# Patient Record
Sex: FEMALE | Race: OTHER | Employment: UNEMPLOYED | URBAN - METROPOLITAN AREA
[De-identification: names, ages, dates, MRNs, and addresses within clinical notes are randomized per-mention and may not be internally consistent; named-entity substitution may affect disease eponyms.]

---

## 2017-04-07 ENCOUNTER — APPOINTMENT (OUTPATIENT)
Dept: GENERAL RADIOLOGY | Age: 57
End: 2017-04-07
Attending: EMERGENCY MEDICINE
Payer: SELF-PAY

## 2017-04-07 ENCOUNTER — HOSPITAL ENCOUNTER (EMERGENCY)
Age: 57
Discharge: HOME | End: 2017-04-07
Attending: EMERGENCY MEDICINE
Payer: SELF-PAY

## 2017-04-07 VITALS
BODY MASS INDEX: 28.71 KG/M2 | HEIGHT: 62 IN | WEIGHT: 156 LBS | DIASTOLIC BLOOD PRESSURE: 89 MMHG | HEART RATE: 102 BPM | OXYGEN SATURATION: 98 % | SYSTOLIC BLOOD PRESSURE: 152 MMHG | TEMPERATURE: 99 F | RESPIRATION RATE: 17 BRPM

## 2017-04-07 DIAGNOSIS — J20.9 ACUTE BRONCHITIS, UNSPECIFIED ORGANISM: Primary | ICD-10-CM

## 2017-04-07 LAB
ALBUMIN SERPL BCP-MCNC: 3.7 G/DL (ref 3.4–5)
ALBUMIN/GLOB SERPL: 0.9 {RATIO} (ref 0.8–1.7)
ALP SERPL-CCNC: 124 U/L (ref 45–117)
ALT SERPL-CCNC: 81 U/L (ref 13–56)
ANION GAP BLD CALC-SCNC: 8 MMOL/L (ref 3–18)
AST SERPL W P-5'-P-CCNC: 50 U/L (ref 15–37)
BASOPHILS # BLD AUTO: 0 K/UL (ref 0–0.06)
BASOPHILS # BLD: 0 % (ref 0–2)
BILIRUB SERPL-MCNC: 0.9 MG/DL (ref 0.2–1)
BUN SERPL-MCNC: 7 MG/DL (ref 7–18)
BUN/CREAT SERPL: 10 (ref 12–20)
CALCIUM SERPL-MCNC: 8.1 MG/DL (ref 8.5–10.1)
CHLORIDE SERPL-SCNC: 106 MMOL/L (ref 100–108)
CK MB CFR SERPL CALC: 1.5 % (ref 0–4)
CK MB SERPL-MCNC: 1.9 NG/ML (ref 5–25)
CK SERPL-CCNC: 127 U/L (ref 26–192)
CO2 SERPL-SCNC: 27 MMOL/L (ref 21–32)
CREAT SERPL-MCNC: 0.68 MG/DL (ref 0.6–1.3)
DIFFERENTIAL METHOD BLD: ABNORMAL
EOSINOPHIL # BLD: 0.4 K/UL (ref 0–0.4)
EOSINOPHIL NFR BLD: 2 % (ref 0–5)
ERYTHROCYTE [DISTWIDTH] IN BLOOD BY AUTOMATED COUNT: 13.8 % (ref 11.6–14.5)
GLOBULIN SER CALC-MCNC: 4 G/DL (ref 2–4)
GLUCOSE SERPL-MCNC: 95 MG/DL (ref 74–99)
HCT VFR BLD AUTO: 36.4 % (ref 35–45)
HGB BLD-MCNC: 12.4 G/DL (ref 12–16)
LACTATE BLD-SCNC: 0.7 MMOL/L (ref 0.4–2)
LYMPHOCYTES # BLD AUTO: 24 % (ref 21–52)
LYMPHOCYTES # BLD: 3.6 K/UL (ref 0.9–3.6)
MCH RBC QN AUTO: 29.7 PG (ref 24–34)
MCHC RBC AUTO-ENTMCNC: 34.1 G/DL (ref 31–37)
MCV RBC AUTO: 87.1 FL (ref 74–97)
MONOCYTES # BLD: 0.5 K/UL (ref 0.05–1.2)
MONOCYTES NFR BLD AUTO: 4 % (ref 3–10)
NEUTS SEG # BLD: 10.3 K/UL (ref 1.8–8)
NEUTS SEG NFR BLD AUTO: 70 % (ref 40–73)
PLATELET # BLD AUTO: 337 K/UL (ref 135–420)
PMV BLD AUTO: 8.6 FL (ref 9.2–11.8)
POTASSIUM SERPL-SCNC: 3.5 MMOL/L (ref 3.5–5.5)
PROT SERPL-MCNC: 7.7 G/DL (ref 6.4–8.2)
RBC # BLD AUTO: 4.18 M/UL (ref 4.2–5.3)
SODIUM SERPL-SCNC: 141 MMOL/L (ref 136–145)
TROPONIN I SERPL-MCNC: <0.02 NG/ML (ref 0–0.04)
WBC # BLD AUTO: 14.9 K/UL (ref 4.6–13.2)

## 2017-04-07 PROCEDURE — 96365 THER/PROPH/DIAG IV INF INIT: CPT

## 2017-04-07 PROCEDURE — 85025 COMPLETE CBC W/AUTO DIFF WBC: CPT | Performed by: EMERGENCY MEDICINE

## 2017-04-07 PROCEDURE — 82550 ASSAY OF CK (CPK): CPT | Performed by: EMERGENCY MEDICINE

## 2017-04-07 PROCEDURE — 71010 XR CHEST PORT: CPT

## 2017-04-07 PROCEDURE — 80053 COMPREHEN METABOLIC PANEL: CPT | Performed by: EMERGENCY MEDICINE

## 2017-04-07 PROCEDURE — 93005 ELECTROCARDIOGRAM TRACING: CPT

## 2017-04-07 PROCEDURE — 74011250636 HC RX REV CODE- 250/636: Performed by: EMERGENCY MEDICINE

## 2017-04-07 PROCEDURE — 99283 EMERGENCY DEPT VISIT LOW MDM: CPT

## 2017-04-07 PROCEDURE — 87040 BLOOD CULTURE FOR BACTERIA: CPT | Performed by: EMERGENCY MEDICINE

## 2017-04-07 PROCEDURE — 83605 ASSAY OF LACTIC ACID: CPT

## 2017-04-07 RX ORDER — AZITHROMYCIN 250 MG/1
TABLET, FILM COATED ORAL
Qty: 6 TAB | Refills: 0 | Status: SHIPPED | OUTPATIENT
Start: 2017-04-07 | End: 2017-06-14

## 2017-04-07 RX ORDER — HYDROCODONE POLISTIREX AND CHLORPHENIRAMINE POLISTIREX 10; 8 MG/5ML; MG/5ML
5 SUSPENSION, EXTENDED RELEASE ORAL
Qty: 60 ML | Refills: 0 | Status: SHIPPED | OUTPATIENT
Start: 2017-04-07 | End: 2017-06-14

## 2017-04-07 RX ORDER — LEVOFLOXACIN 5 MG/ML
500 INJECTION, SOLUTION INTRAVENOUS
Status: COMPLETED | OUTPATIENT
Start: 2017-04-07 | End: 2017-04-07

## 2017-04-07 RX ADMIN — LEVOFLOXACIN 500 MG: 5 INJECTION, SOLUTION INTRAVENOUS at 19:28

## 2017-04-07 NOTE — ED PROVIDER NOTES
HPI Comments: 5:46 PM Cee Nino is a 64 y.o. female with a history of HTN and chronic pain who presents to the emergency department c/o chest pain onset 3 days ago. The patient explains associated symptoms of fever, cough, and SOB . Pt rates the pain 7/10 in severity. Pt denies vomiting, and wheezing. Pt also denies alcohol, tobacco, and drug usage. and  reports having a flu shot this year. No other concerns at this time. PCP: Eliel Aguirre MD      The history is provided by the patient. Past Medical History:   Diagnosis Date    Hypertension     Other ill-defined conditions     chronic pain    Trauma        Past Surgical History:   Procedure Laterality Date    HX ORTHOPAEDIC      repair fracture back         History reviewed. No pertinent family history. Social History     Social History    Marital status: SINGLE     Spouse name: N/A    Number of children: N/A    Years of education: N/A     Occupational History    Not on file. Social History Main Topics    Smoking status: Current Every Day Smoker    Smokeless tobacco: Not on file    Alcohol use No    Drug use: No    Sexual activity: Not on file     Other Topics Concern    Not on file     Social History Narrative         ALLERGIES: Penicillins    Review of Systems   Constitutional: Positive for fever. Negative for chills and fatigue. HENT: Negative for congestion, rhinorrhea and sore throat. Respiratory: Positive for cough and shortness of breath. Negative for wheezing. Cardiovascular: Positive for chest pain. Negative for palpitations. Gastrointestinal: Negative for abdominal pain, diarrhea, nausea and vomiting. Genitourinary: Negative for dysuria, hematuria and urgency. Musculoskeletal: Negative for myalgias. Skin: Negative for rash and wound. Neurological: Negative for dizziness and headaches. All other systems reviewed and are negative.       Vitals:    04/07/17 1743   BP: 152/89   Pulse: (!) 102 Resp: 17   Temp: 99 °F (37.2 °C)   SpO2: 98%   Weight: 70.8 kg (156 lb)   Height: 5' 2\" (1.575 m)            Physical Exam   Constitutional: She is oriented to person, place, and time. She appears well-developed and well-nourished. No distress. HENT:   Head: Normocephalic and atraumatic. Mouth/Throat: Oropharynx is clear and moist.   Eyes: Conjunctivae and EOM are normal. Pupils are equal, round, and reactive to light. No scleral icterus. Neck: Normal range of motion. Neck supple. Cardiovascular: Normal rate, regular rhythm and normal heart sounds. No murmur heard. Pulmonary/Chest: Effort normal. No respiratory distress. She has wheezes (minimal expiratory wheezing ). She exhibits tenderness. Abdominal: Soft. Bowel sounds are normal. She exhibits no distension. There is no tenderness. Lymphadenopathy:     She has no cervical adenopathy. Neurological: She is alert and oriented to person, place, and time. Coordination normal.   Skin: Skin is warm and dry. No rash noted. Psychiatric: She has a normal mood and affect. Her behavior is normal.   Nursing note and vitals reviewed. MDM  Number of Diagnoses or Management Options  Acute bronchitis, unspecified organism:   Diagnosis management comments: Fever with cough and pleuritic chest pain h/o immunocompromised due to humira for arthritis     Ekg; nsr rate 97 no acute st changes     Cxr ?  R LL infiltrate     Will start abx in ED with pt h/o humira use        Amount and/or Complexity of Data Reviewed  Clinical lab tests: ordered and reviewed  Tests in the radiology section of CPT®: ordered and reviewed  Independent visualization of images, tracings, or specimens: yes    Risk of Complications, Morbidity, and/or Mortality  Presenting problems: high  Diagnostic procedures: moderate  Management options: moderate      ED Course       Procedures    Vitals:  Patient Vitals for the past 12 hrs:   Temp Pulse Resp BP SpO2   04/07/17 1743 99 °F (37.2 °C) (!) 102 17 152/89 98 %       Medications ordered:   Medications   levoFLOXacin (LEVAQUIN) 500 mg in D5W IVPB (0 mg IntraVENous IV Completed 4/7/17 2028)         Lab findings:  Recent Results (from the past 12 hour(s))   EKG, 12 LEAD, INITIAL    Collection Time: 04/07/17  5:38 PM   Result Value Ref Range    Ventricular Rate 97 BPM    Atrial Rate 97 BPM    P-R Interval 154 ms    QRS Duration 82 ms    Q-T Interval 348 ms    QTC Calculation (Bezet) 441 ms    Calculated P Axis 57 degrees    Calculated R Axis -5 degrees    Calculated T Axis 37 degrees    Diagnosis       Normal sinus rhythm  Normal ECG  When compared with ECG of 03-JUN-2015 11:19,  Criteria for Septal infarct are no longer present     CBC WITH AUTOMATED DIFF    Collection Time: 04/07/17  5:45 PM   Result Value Ref Range    WBC 14.9 (H) 4.6 - 13.2 K/uL    RBC 4.18 (L) 4.20 - 5.30 M/uL    HGB 12.4 12.0 - 16.0 g/dL    HCT 36.4 35.0 - 45.0 %    MCV 87.1 74.0 - 97.0 FL    MCH 29.7 24.0 - 34.0 PG    MCHC 34.1 31.0 - 37.0 g/dL    RDW 13.8 11.6 - 14.5 %    PLATELET 398 552 - 189 K/uL    MPV 8.6 (L) 9.2 - 11.8 FL    NEUTROPHILS 70 40 - 73 %    LYMPHOCYTES 24 21 - 52 %    MONOCYTES 4 3 - 10 %    EOSINOPHILS 2 0 - 5 %    BASOPHILS 0 0 - 2 %    ABS. NEUTROPHILS 10.3 (H) 1.8 - 8.0 K/UL    ABS. LYMPHOCYTES 3.6 0.9 - 3.6 K/UL    ABS. MONOCYTES 0.5 0.05 - 1.2 K/UL    ABS. EOSINOPHILS 0.4 0.0 - 0.4 K/UL    ABS.  BASOPHILS 0.0 0.0 - 0.06 K/UL    DF AUTOMATED     METABOLIC PANEL, COMPREHENSIVE    Collection Time: 04/07/17  5:45 PM   Result Value Ref Range    Sodium 141 136 - 145 mmol/L    Potassium 3.5 3.5 - 5.5 mmol/L    Chloride 106 100 - 108 mmol/L    CO2 27 21 - 32 mmol/L    Anion gap 8 3.0 - 18 mmol/L    Glucose 95 74 - 99 mg/dL    BUN 7 7.0 - 18 MG/DL    Creatinine 0.68 0.6 - 1.3 MG/DL    BUN/Creatinine ratio 10 (L) 12 - 20      GFR est AA >60 >60 ml/min/1.73m2    GFR est non-AA >60 >60 ml/min/1.73m2    Calcium 8.1 (L) 8.5 - 10.1 MG/DL    Bilirubin, total 0.9 0.2 - 1.0 MG/DL    ALT (SGPT) 81 (H) 13 - 56 U/L    AST (SGOT) 50 (H) 15 - 37 U/L    Alk. phosphatase 124 (H) 45 - 117 U/L    Protein, total 7.7 6.4 - 8.2 g/dL    Albumin 3.7 3.4 - 5.0 g/dL    Globulin 4.0 2.0 - 4.0 g/dL    A-G Ratio 0.9 0.8 - 1.7     CARDIAC PANEL,(CK, CKMB & TROPONIN)    Collection Time: 04/07/17  5:45 PM   Result Value Ref Range     26 - 192 U/L    CK - MB 1.9 <3.6 ng/ml    CK-MB Index 1.5 0.0 - 4.0 %    Troponin-I, Qt. <0.02 0.0 - 0.045 NG/ML   POC LACTIC ACID    Collection Time: 04/07/17  6:03 PM   Result Value Ref Range    Lactic Acid (POC) 0.7 0.4 - 2.0 mmol/L       EKG interpretation by ED Physician:      X-Ray, CT or other radiology findings or impressions:  XR CHEST PORT    (Results Pending)       Progress notes, Consult notes or additional Procedure notes:       Reevaluation of patient:   7:47 PM Patient was discharged in stable condition. Patient is to return to emergency department if any new or worsening condition. Disposition:  Diagnosis:   1. Acute bronchitis, unspecified organism        Disposition: Discharge     Follow-up Information     Follow up With Details Comments Contact [facility] EMERGENCY DEPT  As needed, If symptoms worsen [address] [place] [address]    Jose D Hernández MD Schedule an appointment as soon as possible for a visit for ED follow up appointment [facility]  [phone]              Discharge Medication List as of 4/7/2017  8:31 PM      START taking these medications    Details   azithromycin (ZITHROMAX Z-CECE) 250 mg tablet Take two tablets today then one tablet daily, Print, Disp-6 Tab, R-0      chlorpheniramine-HYDROcodone (TUSSIONEX PENNKINETIC ER) 10-8 mg/5 mL suspension Take 5 mL by mouth every twelve (12) hours as needed for Cough.  Max Daily Amount: 10 mL., Print, Disp-60 mL, R-0         CONTINUE these medications which have NOT CHANGED    Details   Morphine (DORIS) 60 mg ER capsule Take 90 mg by mouth daily. , Historical Med      amLODIPine-benazepril (LOTREL) 5-10 mg per capsule Take 1 Cap by mouth daily. , Historical Med      folic acid (FOLVITE) 1 mg tablet Take  by mouth daily. , Historical Med      gabapentin (NEURONTIN) 300 mg capsule Take 300 mg by mouth three (3) times daily. , Historical Med      adalimumab (HUMIRA) 40 mg/0.8 mL injection by SubCUTAneous route once., Historical Med      ondansetron hcl (ZOFRAN, AS HYDROCHLORIDE,) 4 mg tablet Take 1 Tab by mouth every eight (8) hours as needed for Nausea. , Print, Disp-6 Tab, R-0      magnesium hydroxide (MILK OF MAGNESIA) 400 mg/5 mL suspension Take 30 mL by mouth nightly. , Print, Disp-1 Bottle, R-0           Scribe Attestation  Sinan Bowers scribing for and in presence of Ericka Koch MD (04/07/17)      Physician Attestation  I personally preformed the services described in this documentation, reviewed and edited the documentation which was dictated to the scribe in my presence, and it accurately records my words and actions.      Em Watkins (04/07/17)      Signed by: Ann Krueger, 04/07/17, 5:43 PM

## 2017-04-07 NOTE — DISCHARGE INSTRUCTIONS
Bronquitis: Instrucciones de cuidado - [ Bronchitis: Care Instructions ]  Instrucciones de cuidado    La bronquitis es jennyfer inflamación de los bronquios (conductos bronquiales), que llevan aire a los pulmones. Los tubos se hinchan y producen mucosidad, o flema. La mucosidad y los bronquios inflamados hacen que tosa. Es posible que tenga problemas para respirar. Lina Bon de los casos de bronquitis son causados por virus emma los que causan los resfriados. Los antibióticos generalmente no ayudan y pueden ser dañinos. La bronquitis suele aparecer con Bradly Pique y dura alrededor de 2 a 3 semanas en personas que por lo demás son saludables. La atención de seguimiento es jennyfer parte clave de chun tratamiento y seguridad. Asegúrese de hacer y acudir a todas las citas, y llame a chun médico si está teniendo problemas. También es jennyfer buena idea saber los resultados de los exámenes y mantener jennyfer lista de los medicamentos que amie. ¿Cómo puede cuidarse en el hogar? · [employer] medicamentos exactamente emma le fueron recetados. Llame a chun médico si buddy que está teniendo un problema con dania medicamentos. · Descanse un poco más. · Washam un analgésico (medicamento para el dolor) de venta demetri, emma acetaminofén (Tylenol), ibuprofeno (Advil, Motrin) o naproxeno (Aleve), para reducir la fiebre y [employer] rashad en el cuerpo. Sara y siga todas las instrucciones de la [place]. · No tome dos o más analgésicos al mismo tiempo a menos que el médico se lo haya indicado. Muchos analgésicos contienen acetaminofén, es decir, Tylenol. El exceso de acetaminofén (Tylenol) puede ser dañino. · Washam un medicamento para la tos de venta demetri que contenga dextrometorfano para ayudar a aliviar la tos seca y persistente y así poder dormir. Evite los medicamentos para la tos que tengan más de un ingrediente [place]. Sara y siga todas las instrucciones de la [place].   · Respire aire húmedo de un humidificador, ducha caliente o lavabo lleno de [place]. El calor y la humedad adelgazarán la mucosidad para que pueda expulsarla. · No fume. Fumar puede empeorar la bronquitis. Si necesita ayuda para dejar de fumar, hable con chun médico sobre programas y medicamentos para dejar de fumar. Estos pueden aumentar dania probabilidades de dejar el hábito para siempre. ¿Cuándo debe pedir ayuda? Llame al 911 en cualquier momento que considere que necesita atención de emergencia. Por ejemplo, llame si:  · [address] dificultades para respirar. Llame a chun médico ahora mismo o busque atención médica inmediata si:  · Tiene nueva o peor dificultad para respirar. · Tose mucosidad (esputo) de color café oscuro o con endy. · Tiene jennyfer fiebre nueva o más glenny. · Tiene un salpullido nuevo. Preste especial atención a los cambios en chun apollo y asegúrese de comunicarse con chun médico si:  · Chun tos es más profunda o más frecuente que antes, especialmente si nota más mucosidad o un cambio en el color de la mucosidad. · No mejora emma se esperaba. ¿Dónde puede encontrar más información en inglés? Ayala Draper a http://hayley-derrick.info/. Escriba H333 en la búsqueda para aprender más acerca de \"Bronquitis: Instrucciones de cuidado - [ Bronchitis: Care Instructions ]. \"  Revisado: [address], 2016  Versión del contenido: 11.2  © 4799-7145 [employer]. Las instrucciones de cuidado fueron adaptadas bajo licencia por [employer] (which disclaims liability or warranty for this information). Si usted tiene [place] afección médica o sobre estas instrucciones, siempre pregunte a chun profesional de apollo. [facility] niega toda garantía o responsabilidad por chun uso de esta información.

## 2017-04-08 LAB
ATRIAL RATE: 97 BPM
CALCULATED P AXIS, ECG09: 57 DEGREES
CALCULATED R AXIS, ECG10: -5 DEGREES
CALCULATED T AXIS, ECG11: 37 DEGREES
DIAGNOSIS, 93000: NORMAL
P-R INTERVAL, ECG05: 154 MS
Q-T INTERVAL, ECG07: 348 MS
QRS DURATION, ECG06: 82 MS
QTC CALCULATION (BEZET), ECG08: 441 MS
VENTRICULAR RATE, ECG03: 97 BPM

## 2017-04-13 LAB
BACTERIA SPEC CULT: NORMAL
BACTERIA SPEC CULT: NORMAL
SERVICE CMNT-IMP: NORMAL
SERVICE CMNT-IMP: NORMAL

## 2017-05-05 ENCOUNTER — HOSPITAL ENCOUNTER (EMERGENCY)
Age: 57
Discharge: HOME | End: 2017-05-05
Attending: EMERGENCY MEDICINE | Admitting: EMERGENCY MEDICINE
Payer: SELF-PAY

## 2017-05-05 ENCOUNTER — APPOINTMENT (OUTPATIENT)
Dept: GENERAL RADIOLOGY | Age: 57
End: 2017-05-05
Attending: EMERGENCY MEDICINE
Payer: SELF-PAY

## 2017-05-05 VITALS
RESPIRATION RATE: 9 BRPM | TEMPERATURE: 98.1 F | HEIGHT: 60 IN | DIASTOLIC BLOOD PRESSURE: 65 MMHG | OXYGEN SATURATION: 97 % | WEIGHT: 165 LBS | HEART RATE: 63 BPM | SYSTOLIC BLOOD PRESSURE: 127 MMHG | BODY MASS INDEX: 32.39 KG/M2

## 2017-05-05 DIAGNOSIS — R07.89 ATYPICAL CHEST PAIN: Primary | ICD-10-CM

## 2017-05-05 DIAGNOSIS — M54.50 ACUTE BILATERAL LOW BACK PAIN WITHOUT SCIATICA: ICD-10-CM

## 2017-05-05 LAB
ANION GAP BLD CALC-SCNC: 8 MMOL/L (ref 3–18)
ATRIAL RATE: 71 BPM
BASOPHILS # BLD AUTO: 0 K/UL (ref 0–0.06)
BASOPHILS # BLD: 0 % (ref 0–2)
BUN SERPL-MCNC: 2 MG/DL (ref 7–18)
BUN/CREAT SERPL: 3 (ref 12–20)
CALCIUM SERPL-MCNC: 8.5 MG/DL (ref 8.5–10.1)
CALCULATED P AXIS, ECG09: 56 DEGREES
CALCULATED R AXIS, ECG10: 5 DEGREES
CALCULATED T AXIS, ECG11: 38 DEGREES
CHLORIDE SERPL-SCNC: 106 MMOL/L (ref 100–108)
CK MB CFR SERPL CALC: 1.6 % (ref 0–4)
CK MB CFR SERPL CALC: 1.7 % (ref 0–4)
CK MB SERPL-MCNC: 2.2 NG/ML (ref 5–25)
CK MB SERPL-MCNC: 2.5 NG/ML (ref 5–25)
CK SERPL-CCNC: 141 U/L (ref 26–192)
CK SERPL-CCNC: 151 U/L (ref 26–192)
CO2 SERPL-SCNC: 26 MMOL/L (ref 21–32)
CREAT SERPL-MCNC: 0.7 MG/DL (ref 0.6–1.3)
DIAGNOSIS, 93000: NORMAL
DIFFERENTIAL METHOD BLD: ABNORMAL
EOSINOPHIL # BLD: 0.5 K/UL (ref 0–0.4)
EOSINOPHIL NFR BLD: 5 % (ref 0–5)
ERYTHROCYTE [DISTWIDTH] IN BLOOD BY AUTOMATED COUNT: 13.7 % (ref 11.6–14.5)
GLUCOSE SERPL-MCNC: 100 MG/DL (ref 74–99)
HCT VFR BLD AUTO: 38.9 % (ref 35–45)
HGB BLD-MCNC: 12.9 G/DL (ref 12–16)
LYMPHOCYTES # BLD AUTO: 24 % (ref 21–52)
LYMPHOCYTES # BLD: 2.5 K/UL (ref 0.9–3.6)
MCH RBC QN AUTO: 29.2 PG (ref 24–34)
MCHC RBC AUTO-ENTMCNC: 33.2 G/DL (ref 31–37)
MCV RBC AUTO: 88 FL (ref 74–97)
MONOCYTES # BLD: 0.5 K/UL (ref 0.05–1.2)
MONOCYTES NFR BLD AUTO: 5 % (ref 3–10)
NEUTS SEG # BLD: 7 K/UL (ref 1.8–8)
NEUTS SEG NFR BLD AUTO: 66 % (ref 40–73)
P-R INTERVAL, ECG05: 156 MS
PLATELET # BLD AUTO: 324 K/UL (ref 135–420)
PMV BLD AUTO: 8.7 FL (ref 9.2–11.8)
POTASSIUM SERPL-SCNC: 4.5 MMOL/L (ref 3.5–5.5)
Q-T INTERVAL, ECG07: 392 MS
QRS DURATION, ECG06: 76 MS
QTC CALCULATION (BEZET), ECG08: 425 MS
RBC # BLD AUTO: 4.42 M/UL (ref 4.2–5.3)
SODIUM SERPL-SCNC: 140 MMOL/L (ref 136–145)
TROPONIN I SERPL-MCNC: <0.02 NG/ML (ref 0–0.04)
TROPONIN I SERPL-MCNC: <0.02 NG/ML (ref 0–0.04)
VENTRICULAR RATE, ECG03: 71 BPM
WBC # BLD AUTO: 10.5 K/UL (ref 4.6–13.2)

## 2017-05-05 PROCEDURE — 99284 EMERGENCY DEPT VISIT MOD MDM: CPT

## 2017-05-05 PROCEDURE — 74011250637 HC RX REV CODE- 250/637: Performed by: EMERGENCY MEDICINE

## 2017-05-05 PROCEDURE — 80048 BASIC METABOLIC PNL TOTAL CA: CPT | Performed by: EMERGENCY MEDICINE

## 2017-05-05 PROCEDURE — 72110 X-RAY EXAM L-2 SPINE 4/>VWS: CPT

## 2017-05-05 PROCEDURE — 82550 ASSAY OF CK (CPK): CPT | Performed by: EMERGENCY MEDICINE

## 2017-05-05 PROCEDURE — 85025 COMPLETE CBC W/AUTO DIFF WBC: CPT | Performed by: EMERGENCY MEDICINE

## 2017-05-05 PROCEDURE — 93005 ELECTROCARDIOGRAM TRACING: CPT

## 2017-05-05 PROCEDURE — 71010 XR CHEST SNGL V: CPT

## 2017-05-05 RX ORDER — ACETAMINOPHEN 325 MG/1
650 TABLET ORAL
Qty: 20 TAB | Refills: 0 | Status: SHIPPED | OUTPATIENT
Start: 2017-05-05 | End: 2017-06-14

## 2017-05-05 RX ORDER — ACETAMINOPHEN 500 MG
1000 TABLET ORAL
Status: COMPLETED | OUTPATIENT
Start: 2017-05-05 | End: 2017-05-05

## 2017-05-05 RX ORDER — GUAIFENESIN 100 MG/5ML
324 LIQUID (ML) ORAL
Status: COMPLETED | OUTPATIENT
Start: 2017-05-05 | End: 2017-05-05

## 2017-05-05 RX ADMIN — ASPIRIN 81 MG CHEWABLE TABLET 324 MG: 81 TABLET CHEWABLE at 14:55

## 2017-05-05 RX ADMIN — ACETAMINOPHEN 1000 MG: 500 TABLET ORAL at 14:55

## 2017-05-05 NOTE — ED PROVIDER NOTES
HPI Comments: Christie Diaz is a 64 y.o. female with a pertinent history of chronic pain, HTN who presents to the emergency department for evaluation of CP, HA, and low back pain. States she fell 13 days ago and has been having non-radiating bilateral low back pain. No associated numbness or tingling. Pt is also complaining of left sided chest pain, constant, with associated HA since last night. No diaphoresis or SOB. She has tried advil without relief of pain. Pt denies any fevers or chills, dizziness or light headedness, ENT issues, cough, n/v/d/c, abd pain, diaphoresis, melena/hematochezia, dysuria, hematuria, frequency, focal weakness/numbness/tingling, or rash. Patient has no other complaints at this time. PCP:  Ivan Figueroa MD        Patient is a 64 y.o. female presenting with chest pain and fall. Chest Pain (Angina)    Associated symptoms include back pain and headaches. Pertinent negatives include no abdominal pain, no cough, no diaphoresis, no dizziness, no fever, no nausea, no shortness of breath and no vomiting. Fall   Associated symptoms include headaches. Pertinent negatives include no fever, no abdominal pain, no nausea, no vomiting and no hematuria. Past Medical History:   Diagnosis Date    Hypertension     Other ill-defined conditions     chronic pain    Trauma        Past Surgical History:   Procedure Laterality Date    HX ORTHOPAEDIC      repair fracture back         No family history on file. Social History     Social History    Marital status: SINGLE     Spouse name: N/A    Number of children: N/A    Years of education: N/A     Occupational History    Not on file.      Social History Main Topics    Smoking status: Current Every Day Smoker    Smokeless tobacco: Not on file    Alcohol use No    Drug use: No    Sexual activity: Not on file     Other Topics Concern    Not on file     Social History Narrative         ALLERGIES: Penicillins    Review of Systems   Constitutional: Negative for chills, diaphoresis and fever. HENT: Negative for congestion, rhinorrhea and sore throat. Respiratory: Negative for cough and shortness of breath. Cardiovascular: Positive for chest pain. Gastrointestinal: Negative for abdominal pain, blood in stool, constipation, diarrhea, nausea and vomiting. Genitourinary: Negative for dysuria, frequency and hematuria. Musculoskeletal: Positive for back pain. Negative for gait problem and myalgias. Skin: Negative for rash and wound. Neurological: Positive for headaches. Negative for dizziness. Vitals:    05/05/17 1356 05/05/17 1530 05/05/17 1600 05/05/17 1700   BP: 164/75 120/66 121/67 127/65   Pulse: 83 68 70 63   Resp: 16 14 13 9   Temp: 98.1 °F (36.7 °C)      SpO2: 97% 97% 96% 97%   Weight: 74.8 kg (165 lb)      Height: 5' (1.524 m)               Physical Exam   Constitutional: She is oriented to person, place, and time. She appears well-developed and well-nourished. No distress. HENT:   Head: Normocephalic and atraumatic. Eyes: Conjunctivae are normal. Right eye exhibits no discharge. Left eye exhibits no discharge. Neck: Normal range of motion. Neck supple. No thyromegaly present. Cardiovascular: Normal rate, regular rhythm and normal heart sounds. Exam reveals no gallop and no friction rub. No murmur heard. Pulmonary/Chest: Effort normal and breath sounds normal. No respiratory distress. She has no wheezes. She has no rales. She exhibits no tenderness. CP non-reproducible. Lungs are CTAB   Abdominal: Soft. Bowel sounds are normal. She exhibits no distension. There is no tenderness. There is no rebound and no guarding. Musculoskeletal: She exhibits no edema, tenderness or deformity. TTP noted to bilateral lumbar paraspinal muscles.   No obvious deformity, step-off deformity, midline spinal tenderness, edema, ecchymosis, erythema, or overlying skin changes noted on exam.  Pt is ambulatory with even, steady gait, moving BLE with 5/5 strength and FROM against resistance in flexion and extension. Pt is neurovascularly intact distally with cap refill < 3 seconds and intact sensation. Lymphadenopathy:     She has no cervical adenopathy. Neurological: She is alert and oriented to person, place, and time. She has normal reflexes. Skin: Skin is warm and dry. She is not diaphoretic. Psychiatric: She has a normal mood and affect. Nursing note and vitals reviewed. MDM  Number of Diagnoses or Management Options  Acute bilateral low back pain without sciatica: new and requires workup  Atypical chest pain: new and requires workup  Diagnosis management comments: Differential Diagnosis: Pneumonia, pulmonary embolism, chest wall pain, angina/MI, pleurisy, pericarditis, myopericarditis, herpes zoster, aortic dissection, Prinzmetal angina, acute pericardial tamponade, GERD, PUD, esophageal motility disorders, sciatica, myalgia, fracture, dislocation    Plan: Pt presents ambulatory in NAD, well-hydrated, non-toxic in appearance, and afebrile with normal vitals. Negative XRs for acute process. EKG shows NSR. Troponin wnl x 2. Labs otherwise unremarkable. Will DC home with tylenol. PCP follow-up. At this time, patient is stable and appropriate for discharge home. Patient demonstrates understanding of current diagnoses and is in agreement with the treatment plan. They are advised that while the likelihood of serious underlying condition is low at this point given the evaluation performed today, we cannot fully rule it out. They are advised to immediately return with any new symptoms or worsening of current condition. All questions have been answered. Patient is given educational material regarding their diagnoses, including danger symptoms and when to return to the ED.          Amount and/or Complexity of Data Reviewed  Clinical lab tests: ordered and reviewed  Tests in the radiology section of CPT®: ordered and reviewed  Tests in the medicine section of CPT®: ordered and reviewed  Review and summarize past medical records: yes    Risk of Complications, Morbidity, and/or Mortality  Presenting problems: moderate  Diagnostic procedures: moderate  Management options: moderate    Patient Progress  Patient progress: improved    ED Course       Procedures    -------------------------------------------------------------------------------------------------------------------  Orders:  Orders Placed This Encounter    XR SPINE LUMB MIN 4 V     Standing Status:   Standing     Number of Occurrences:   1     Order Specific Question:   Transport     Answer:   Stretcher [5]     Order Specific Question:   Reason for Exam     Answer:   Back pain    XR CHEST SNGL V     Standing Status:   Standing     Number of Occurrences:   1     Order Specific Question:   Transport     Answer:   Ambulatory [1]     Order Specific Question:   Reason for Exam     Answer:   chest pain, sob, and/or arrhythmia    CBC WITH AUTOMATED DIFF     Standing Status:   Standing     Number of Occurrences:   1    METABOLIC PANEL, BASIC     Standing Status:   Standing     Number of Occurrences:   1    CARDIAC PANEL,(CK, CKMB & TROPONIN)     Standing Status:   Standing     Number of Occurrences:   1    CARDIAC PANEL,(CK, CKMB & TROPONIN)     Standing Status:   Standing     Number of Occurrences:   1    EKG, 12 LEAD, INITIAL     Standing Status:   Standing     Number of Occurrences:   1     Order Specific Question:   Reason for Exam:     Answer:   rapid heart rate    aspirin chewable tablet 324 mg    acetaminophen (TYLENOL) tablet 1,000 mg    acetaminophen (TYLENOL) 325 mg tablet     Sig: Take 2 Tabs by mouth every four (4) hours as needed for Pain.      Dispense:  20 Tab     Refill:  0        Lab Results:   Recent Results (from the past 12 hour(s))   EKG, 12 LEAD, INITIAL    Collection Time: 05/05/17  1:47 PM   Result Value Ref Range Ventricular Rate 71 BPM    Atrial Rate 71 BPM    P-R Interval 156 ms    QRS Duration 76 ms    Q-T Interval 392 ms    QTC Calculation (Bezet) 425 ms    Calculated P Axis 56 degrees    Calculated R Axis 5 degrees    Calculated T Axis 38 degrees    Diagnosis       Normal sinus rhythm  Normal ECG  When compared with ECG of 07-APR-2017 17:38,  No significant change was found  Confirmed by Juana Crabtree ([id number]) on 5/5/2017 3:23:09 PM     CBC WITH AUTOMATED DIFF    Collection Time: 05/05/17  2:10 PM   Result Value Ref Range    WBC 10.5 4.6 - 13.2 K/uL    RBC 4.42 4.20 - 5.30 M/uL    HGB 12.9 12.0 - 16.0 g/dL    HCT 38.9 35.0 - 45.0 %    MCV 88.0 74.0 - 97.0 FL    MCH 29.2 24.0 - 34.0 PG    MCHC 33.2 31.0 - 37.0 g/dL    RDW 13.7 11.6 - 14.5 %    PLATELET 377 273 - 251 K/uL    MPV 8.7 (L) 9.2 - 11.8 FL    NEUTROPHILS 66 40 - 73 %    LYMPHOCYTES 24 21 - 52 %    MONOCYTES 5 3 - 10 %    EOSINOPHILS 5 0 - 5 %    BASOPHILS 0 0 - 2 %    ABS. NEUTROPHILS 7.0 1.8 - 8.0 K/UL    ABS. LYMPHOCYTES 2.5 0.9 - 3.6 K/UL    ABS. MONOCYTES 0.5 0.05 - 1.2 K/UL    ABS. EOSINOPHILS 0.5 (H) 0.0 - 0.4 K/UL    ABS.  BASOPHILS 0.0 0.0 - 0.06 K/UL    DF AUTOMATED     METABOLIC PANEL, BASIC    Collection Time: 05/05/17  2:10 PM   Result Value Ref Range    Sodium 140 136 - 145 mmol/L    Potassium 4.5 3.5 - 5.5 mmol/L    Chloride 106 100 - 108 mmol/L    CO2 26 21 - 32 mmol/L    Anion gap 8 3.0 - 18 mmol/L    Glucose 100 (H) 74 - 99 mg/dL    BUN 2 (L) 7.0 - 18 MG/DL    Creatinine 0.70 0.6 - 1.3 MG/DL    BUN/Creatinine ratio 3 (L) 12 - 20      GFR est AA >60 >60 ml/min/1.73m2    GFR est non-AA >60 >60 ml/min/1.73m2    Calcium 8.5 8.5 - 10.1 MG/DL   CARDIAC PANEL,(CK, CKMB & TROPONIN)    Collection Time: 05/05/17  2:10 PM   Result Value Ref Range     26 - 192 U/L    CK - MB 2.5 <3.6 ng/ml    CK-MB Index 1.7 0.0 - 4.0 %    Troponin-I, Qt. <0.02 0.0 - 0.045 NG/ML   CARDIAC PANEL,(CK, CKMB & TROPONIN)    Collection Time: 05/05/17  5:05 PM   Result Value Ref Range     26 - 192 U/L    CK - MB 2.2 <3.6 ng/ml    CK-MB Index 1.6 0.0 - 4.0 %    Troponin-I, Qt. <0.02 0.0 - 0.045 NG/ML     Radiology Results:  XR SPINE LUMB MIN 4 V   Final Result   Impression:  1. No acute fracture or subluxation. 2. Evidence of corpectomy with stabilization hardware at T12 unchanged. XR CHEST SNGL V   Final Result   Impression:  No acute cardiopulmonary disease. Progress Notes:  6:04 PM:  Rei Roberts PA-C was at the pt's bedside, assessed the pt and answered the pt's questions regarding treatment.    -------------------------------------------------------------------------------------------------------------------    Disposition:  Diagnosis:   1. Atypical chest pain    2. Acute bilateral low back pain without sciatica        Disposition: [place] Home    Follow-up Information     Follow up With Details Comments [address] [place] [address] Dr Kamaljit Woo MD Call in 3 days For follow-up [address] [facility] [address] [place] [address] EMERGENCY DEPT Go to As needed, If symptoms worsen [address] [phone]          Patient's Medications   Start Taking    ACETAMINOPHEN (TYLENOL) 325 MG TABLET    Take 2 Tabs by mouth every four (4) hours as needed for Pain. Continue Taking    ADALIMUMAB (HUMIRA) 40 MG/0.8 ML INJECTION    by SubCUTAneous route once. AMLODIPINE-BENAZEPRIL (LOTREL) 5-10 MG PER CAPSULE    Take 1 Cap by mouth daily. AZITHROMYCIN (ZITHROMAX Z-CECE) 250 MG TABLET    Take two tablets today then one tablet daily    CHLORPHENIRAMINE-HYDROCODONE (TUSSIONEX PENNKINETIC ER) 10-8 MG/5 ML SUSPENSION    Take 5 mL by mouth every twelve (12) hours as needed for Cough. Max Daily Amount: 10 mL. FOLIC ACID (FOLVITE) 1 MG TABLET    Take  by mouth daily. GABAPENTIN (NEURONTIN) 300 MG CAPSULE    Take 300 mg by mouth three (3) times daily.     MAGNESIUM HYDROXIDE (MILK OF MAGNESIA) 400 MG/5 ML SUSPENSION    Take 30 mL by mouth nightly. MORPHINE (DORIS) 60 MG ER CAPSULE    Take 90 mg by mouth daily. ONDANSETRON HCL (ZOFRAN, AS HYDROCHLORIDE,) 4 MG TABLET    Take 1 Tab by mouth every eight (8) hours as needed for Nausea.    These Medications have changed    No medications on file   Stop Taking    No medications on file

## 2017-05-05 NOTE — ED NOTES
Purposeful rounding completed:    Side rails up x 1:  YES  Bed in low position and wheels locked: YES  Call bell within reach: YES  Comfort addressed: YES    Toileting needs addressed: YES  Plan of care reviewed/updated with patient and or family members: YES  IV site assessed: YES  Pain assessed and addressed: YES

## 2017-05-05 NOTE — DISCHARGE INSTRUCTIONS
Dolor de espalda, síntomas urgentes o de emergencia: Instrucciones de cuidado - [ Back Pain, Emergency or Urgent Symptoms: Care Instructions ]  Instrucciones de cuidado  Muchas personas tienen dolor de espalda en algún momento. En la [employer], el dolor mejora con los cuidados personales, lo que incluye analgésicos (medicamentos para el dolor) de [place], hielo, calor y ejercicios. A menos que tenga síntomas de jennyfer lesión grave o de ataque al corazón, es posible que pueda dejar pasar algunos días antes de llamar al médico. Kandace algunos problemas de espalda son muy graves. No ignore los síntomas que deberían ser revisados de inmediato. La atención de seguimiento es jennyfer parte clave de chun tratamiento y seguridad. Asegúrese de hacer y acudir a todas las citas, y llame a chun médico si está teniendo problemas. También es jennyfer buena idea saber los resultados de los exámenes y mantener jennyfer lista de los medicamentos que amie. ¿Cómo puede cuidarse en el hogar? · Siéntese o acuéstese en las posiciones más cómodas y que reduzcan el dolor. Pruebe jennyfer de estas posiciones cuando se acueste:  ¨ Acuéstese boca arriba con las rodillas dobladas y apoyadas sobre [address]. ¨ Acuéstese en el piso con las piernas sobre el asiento de un sofá o jennyfer silla. ¨ Acuéstese de lado con las rodillas [address] las caderas y [place] las piernas. ¨ Acuéstese boca abajo siempre que esta posición no empeore el dolor. · No se siente sobre la cama y evite los sillones blandos, así emma las posiciones torcidas. El reposo en cama puede aliviar el dolor al principio, kandace retrasa la sanación. Evite reposar en la cama después del primer día. · Cambie de posición cada 30 minutos. Si debe sentarse por [employer], párese y descanse de estar sentado. Levántese y camine, o acuéstese en posición horizontal.  · Pruebe a usar jennyfer almohadilla térmica a temperatura baja o mediana de 15 a 20 minutos cada 2 o 3 horas. Pruebe con Maridee Peed ducha tibia en vez de jennyfer sesión con la almohadilla térmica. También puede comprar envolturas calientes (\"heat wraps\") desechables que marshall hasta 8 horas. También puede tratar de colocarse hielo o compresas frías en la espalda de 10 a 20 minutos cada vez, varias veces al día. (Póngase un paño dye entre el hielo y la piel). [place] reduce el dolor y le será más fácil mantenerse activo y hacer ejercicio. · [employer] analgésicos exactamente según las indicaciones. ¨ Si el médico le recetó analgésicos, tómelos según las indicaciones. ¨ Si no está tomando un analgésico recetado, pregúntele a chun médico si puede carmelina kiley de [employer]. ¿Cuándo debe pedir ayuda? Llame al 911 en cualquier momento que considere que necesita atención de [place]. Por ejemplo, llame si:  · Es completamente incapaz de  jennyfer pierna. · Tiene dolor de espalda junto con dolor abdominal intenso. · Tiene síntomas de un ataque al corazón. Estos pueden incluir:  ¨ Dolor u opresión en el pecho, o jennyfer sensación extraña en el pecho. ¨ Sudoración. ¨ Falta de aire. ¨ Náuseas o vómito. ¨ Dolor, opresión o jennyfer sensación extraña en la espalda, el medardo, la mandíbula o la parte superior del abdomen, o en kiley o ambos hombros o brazos. ¨ Aturdimiento o debilidad repentina. ¨ Latidos del corazón rápidos o irregulares. Después de que llame al 911, el operador puede decirle que [place] 1 aspirina para adultos o de 2 a 4 aspirinas de dosis baja. Espere jennyfer ambulancia. No trate de conducir usted mismo. Llame a chun médico ahora mismo o busque atención médica inmediata si:  · Tiene síntomas nuevos o peores en los brazos, las piernas, el pecho, el abdomen o las nalgas (glúteos). Los síntomas pueden incluir:  ¨ Entumecimiento u hormigueo. ¨ Debilidad. ¨ Dolor. · Pierde el control de la vejiga o del intestino. · Tiene dolor de espalda y:  ¨ Se ha lesionado la espalda al levantar o al hacer alguna [place].  Llame si el dolor es intenso, no ha desaparecido después de 1 o 2 días y no puede hacer dania actividades normales diarias. ¨ Anteriormente ha tenido jennyfer lesión en la espalda que necesitó tratamiento. ¨ El dolor ha durado más de 4 11 [facility]. ¨ [place] perdido peso de [place] inexplicable. ¨ Tiene [address]. ¨ Tiene o tuvo cáncer. Preste especial atención a los cambios en chun apollo y asegúrese de comunicarse con chun médico si no mejora emma se esperaba. ¿Dónde puede encontrar más información en inglés? Charis Mclean a http://hayley-derrick.info/. Ladonna Dick H031 en la búsqueda para aprender más acerca de \"Dolor de espalda, síntomas urgentes o de emergencia: Instrucciones de cuidado - [ Back Pain, Emergency or Urgent Symptoms: Care Instructions ]. \"  Revisado: 27 [place], 2016  Versión del contenido: 11.2  © 0304-0118 Healthwise, Incorporated. Las instrucciones de cuidado fueron adaptadas bajo licencia por [employer] (which disclaims liability or warranty for this information). Si usted tiene [place] afección médica o sobre estas instrucciones, siempre pregunte a chun profesional de apollo. Healthwise, Incorporated niega toda garantía o responsabilidad por chun uso de esta información. Dolor de pecho: Instrucciones de cuidado - [ Chest Pain: Care Instructions ]  Instrucciones de cuidado  El dolor de pecho puede tener muchas causas. Algunas no son graves y mejorarán por sí solas en pocos días. Kandace algunos tipos de dolor de pecho requieren más pruebas y [place]. Es posible que chun médico le haya recomendado jennyfer visita de seguimiento dentro de las 8 a 12 horas siguientes. Si no mejora, es posible que necesite [address] pruebas o [place]. Aunque chun médico le haya dado de glenny, es necesario que esté atento a cualquier problema que se presente. El médico le hizo un cuidadoso chequeo, kandace a veces los problemas pueden aparecer posteriormente.  Si tiene nuevos síntomas o éstos no mejoran, [facility] atención médica de inmediato. Si tiene dolor o presión en el pecho que empeora o es diferente y que dura más de 5 minutos, o se desmayó (perdió el conocimiento), llame al 911 o busque otra ayuda de emergencia de inmediato. Acudir a jennyfer consulta médica es sólo un paso en chun tratamiento. Aunque se sienta mejor, todavía deberá hacer lo que chun médico le recomiende, emma asistir a todas las visitas de seguimiento sugeridas y carmelina los medicamentos exactamente [employer]	[place] fueron indicados. [place] le ayudará a recuperarse y prevenir problemas futuros. ¿Cómo puede cuidarse en el hogar? · Descanse hasta que se sienta mejor. · Ronan dania medicamentos exactamente emma le fueron recetados. Llame a chun médico si buddy estar teniendo problemas con chun medicamento. · No conduzca después de carmelina un analgésico (medicamento para el dolor) recetado. ¿Cuándo debe pedir ayuda? Llame al 911 si:  · Se desmayó (perdió el conocimiento). · Tiene graves dificultades para respirar. · Tiene síntomas de un ataque al corazón. Estos podrían incluir:  ¨ Dolor o presión en el pecho, o jennyfer sensación extraña en el pecho. ¨ Sudoración. ¨ Falta de aire. ¨ Náuseas o vómito. ¨ Dolor, presión o jennyfer sensación extraña en la espalda, el medardo, la mandíbula, la parte superior del abdomen o en kiley o ambos hombros o brazos. ¨ Aturdimiento o debilidad repentina. ¨ Latidos del corazón rápidos o irregulares. Después de llamar al 911, es posible que el operador le diga que mast[place] 1 aspirina para adultos o de 2 a 4 aspirinas de dosis baja. Espere a jennyfer ambulancia. No intente conducir usted mismo. Llame hoy a chun médico si:  · Tiene cualquier dificultad para respirar. · El dolor en el pecho empeora. · Siente mareos o aturdimiento, o que está a punto de [place]. · No mejora emma se esperaba. · Tiene dolor de pecho nuevo o diferente. ¿Dónde puede encontrar más información en inglés? Romelia Peres a http://hayley-derrick.info/.   Escriba A120 en la búsqueda para aprender más acerca de \"Dolor de pecho: Instrucciones de cuidado - [ Chest Pain: Care Instructions ]. \"  Revisado: 27 char[place], 2016  Versión del contenido: 11.2  © 3792-5841 Healthwise, Incorporated. Las instrucciones de cuidado fueron adaptadas bajo licencia por [employer] (which disclaims liability or warranty for this information). Si usted tiene [place] afección médica o sobre estas instrucciones, siempre pregunte a chun profesional de apollo. Healthwise, Incorporated niega toda garantía o responsabilidad por chun uso de esta información.

## 2017-05-05 NOTE — ED NOTES
Provider in triage ---- check pain and sob since this morning, also fell 13 days ago and coccyx hurts. Tried Advil  Ekg, labs, cxr, ASA, tylenol, LSpine xray. Main treatment area.  Initial EKG neg

## 2017-06-14 ENCOUNTER — HOSPITAL ENCOUNTER (EMERGENCY)
Age: 57
Discharge: HOME | End: 2017-06-14
Attending: EMERGENCY MEDICINE
Payer: SELF-PAY

## 2017-06-14 ENCOUNTER — APPOINTMENT (OUTPATIENT)
Dept: GENERAL RADIOLOGY | Age: 57
End: 2017-06-14
Attending: EMERGENCY MEDICINE
Payer: SELF-PAY

## 2017-06-14 VITALS
TEMPERATURE: 98.5 F | HEIGHT: 60 IN | OXYGEN SATURATION: 95 % | BODY MASS INDEX: 29.45 KG/M2 | HEART RATE: 77 BPM | RESPIRATION RATE: 16 BRPM | SYSTOLIC BLOOD PRESSURE: 143 MMHG | WEIGHT: 150 LBS | DIASTOLIC BLOOD PRESSURE: 68 MMHG

## 2017-06-14 DIAGNOSIS — J20.9 ACUTE BRONCHITIS, UNSPECIFIED ORGANISM: Primary | ICD-10-CM

## 2017-06-14 PROCEDURE — 99282 EMERGENCY DEPT VISIT SF MDM: CPT

## 2017-06-14 PROCEDURE — 71020 XR CHEST PA LAT: CPT

## 2017-06-14 PROCEDURE — 74011000250 HC RX REV CODE- 250: Performed by: EMERGENCY MEDICINE

## 2017-06-14 PROCEDURE — 94640 AIRWAY INHALATION TREATMENT: CPT

## 2017-06-14 RX ORDER — AZITHROMYCIN 250 MG/1
TABLET, FILM COATED ORAL
Qty: 6 TAB | Refills: 0 | Status: SHIPPED | OUTPATIENT
Start: 2017-06-14 | End: 2018-07-06

## 2017-06-14 RX ORDER — ALBUTEROL SULFATE 90 UG/1
2 AEROSOL, METERED RESPIRATORY (INHALATION)
Qty: 1 INHALER | Refills: 0 | Status: SHIPPED | OUTPATIENT
Start: 2017-06-14 | End: 2019-04-26

## 2017-06-14 RX ORDER — IPRATROPIUM BROMIDE AND ALBUTEROL SULFATE 2.5; .5 MG/3ML; MG/3ML
3 SOLUTION RESPIRATORY (INHALATION) ONCE
Status: COMPLETED | OUTPATIENT
Start: 2017-06-14 | End: 2017-06-14

## 2017-06-14 RX ADMIN — IPRATROPIUM BROMIDE AND ALBUTEROL SULFATE 3 ML: .5; 3 SOLUTION RESPIRATORY (INHALATION) at 19:05

## 2017-06-14 NOTE — DISCHARGE INSTRUCTIONS
SPECIFIC PATIENT INSTRUCTIONS FROM THE PHYSICIAN WHO TREATED YOU IN THE ER TODAY:  1. Return if any concerns or worsening of condition(s)  2. Zithromax as prescribed until finished. 3.  Use the albuterol inhaler as prescribed for wheezing and/or cough. 4.  Over the counter cough syrup for cough. 5.  FOLLOW UP APPOINTMENT:  Your primary doctor in 1-2 days. Bronquitis: Instrucciones de cuidado - [ Bronchitis: Care Instructions ]  Instrucciones de cuidado    La bronquitis es jennyfer inflamación de los bronquios (conductos bronquiales), que llevan aire a los pulmones. Los tubos se hinchan y producen mucosidad, o flema. La mucosidad y los bronquios inflamados hacen que tosa. Es posible que tenga problemas para respirar. Lele Abelson de los casos de bronquitis son causados por virus emma los que causan los resfriados. Los antibióticos generalmente no ayudan y pueden ser dañinos. La bronquitis suele aparecer con Rondel Derrick y dura alrededor de 2 a 3 semanas en personas que por lo demás son saludables. La atención de seguimiento es jennyfer parte clave de chun tratamiento y seguridad. Asegúrese de hacer y acudir a todas las citas, y llame a chun médico si está teniendo problemas. También es jennyfer buena idea saber los resultados de los exámenes y mantener jennyfer lista de los medicamentos que amie. ¿Cómo puede cuidarse en el hogar? · [employer] medicamentos exactamente emma le fueron recetados. Llame a chun médico si buddy que está teniendo un problema con dania medicamentos. · Descanse un poco más. · [place] un analgésico (medicamento para el dolor) de venta demetri, emma acetaminofén (Tylenol), ibuprofeno (Advil, Motrin) o naproxeno (Aleve), para reducir la fiebre y [employer] rashad en el cuerpo. Sara y siga todas las instrucciones de la [place]. · No tome dos o más analgésicos al mismo tiempo a menos que el médico se lo haya indicado. Muchos analgésicos contienen acetaminofén, es decir, Tylenol.  El exceso de acetaminofén (Tylenol) puede ser dañino. · [place] un medicamento para la tos de venta demetri que contenga dextrometorfano para ayudar a aliviar la tos seca y persistente y así poder dormir. Evite los medicamentos para la tos que tengan más de un ingrediente [place]. Sara y siga todas las instrucciones de la [place]. · Respire aire húmedo de un humidificador, ducha caliente o lavabo lleno de [place]. El calor y la humedad adelgazarán la mucosidad para que pueda expulsarla. · No fume. Fumar puede empeorar la bronquitis. Si necesita ayuda para dejar de fumar, hable con chun médico sobre programas y medicamentos para dejar de fumar. Estos pueden aumentar dania probabilidades de dejar el hábito para siempre. ¿Cuándo debe pedir ayuda? Llame al 911 en cualquier momento que considere que necesita atención de emergencia. Por ejemplo, llame si:  · [address] dificultades para respirar. Llame a chun médico ahora mismo o busque atención médica inmediata si:  · Tiene nueva o peor dificultad para respirar. · Tose mucosidad (esputo) de color café oscuro o con nedy. · Tiene jennyfer fiebre nueva o más glenny. · Tiene un salpullido nuevo. Preste especial atención a los cambios en chun apollo y asegúrese de comunicarse con chun médico si:  · Chun tos es más profunda o más frecuente que antes, especialmente si nota más mucosidad o un cambio en el color de la mucosidad. · No mejora emma se esperaba. ¿Dónde puede encontrar más información en inglés? Vini Chambers a http://sarahi.info/. Escriba H333 en la búsqueda para aprender más acerca de \"Bronquitis: Instrucciones de cuidado - [ Bronchitis: Care Instructions ]. \"  Revisado: 23 charles, 2016  Versión del contenido: 11.2  © 7798-4282 [employer]. Las instrucciones de cuidado fueron adaptadas bajo licencia por [employer] (which disclaims liability or warranty for this information).  Si usted tiene [place] afección médica o sobre estas instrucciones, siempre pregunte a chun profesional de apollo. [employer] niega toda garantía o responsabilidad por chun uso de esta información. MyChart Activation    Thank you for requesting access to [facility]. Please follow the instructions below to securely access and download your online medical record. [facility] allows you to send messages to your doctor, view your test results, renew your prescriptions, schedule appointments, and more. How Do I Sign Up? 1. In your internet browser, go to [email or web address]. 2. Click on the First Time User? Click Here link in the Sign In box. You will see the New Member Sign Up page. 3. Enter your [facility] Access Code exactly as it appears below. You will not need to use this code after youve completed the sign-up process. If you do not sign up before the expiration date, you must request a new code. [facility] Access Code: [email or web address]  Expires: 2017  6:20 PM (This is the date your [facility] access code will )    4. Enter the last four digits of your Social Security Number (xxxx) and Date of Birth (mm/dd/yyyy) as indicated and click Submit. You will be taken to the next sign-up page. 5. Create a [facility] ID. This will be your [facility] login ID and cannot be changed, so think of one that is secure and easy to remember. 6. Create a [facility] password. You can change your password at any time. 7. Enter your Password Reset Question and Answer. This can be used at a later time if you forget your password. 8. Enter your e-mail address. You will receive e-mail notification when new information is available in [address] 9. Click Sign Up. You can now view and download portions of your medical record. 10. Click the Download Summary menu link to download a portable copy of your medical information.     Additional Information    If you have questions, please visit the Frequently Asked Questions section of the [facility] website at [email or web address] Remember, [email or web address] is NOT to be used for urgent needs. For medical emergencies, dial 911.

## 2017-06-14 NOTE — ED PROVIDER NOTES
[facility] EMERGENCY DEPT      64 y.o. female with noted past medical history of HTN who presents to the emergency department c/o dry cough onset 3 days ago. Pt reported mild CP. Pt denied n/v/d, SOB All other sx denied. No other complaints at this time. No other complaints. Current Facility-Administered Medications   Medication Dose Route Frequency    albuterol-ipratropium (DUO-NEB) 2.5 MG-0.5 MG/3 ML  3 mL Nebulization ONCE     Current Outpatient Prescriptions   Medication Sig    acetaminophen (TYLENOL) 325 mg tablet Take 2 Tabs by mouth every four (4) hours as needed for Pain.  azithromycin (ZITHROMAX Z-CECE) 250 mg tablet Take two tablets today then one tablet daily    chlorpheniramine-HYDROcodone (TUSSIONEX PENNKINETIC ER) 10-8 mg/5 mL suspension Take 5 mL by mouth every twelve (12) hours as needed for Cough. Max Daily Amount: 10 mL.  ondansetron hcl (ZOFRAN, AS HYDROCHLORIDE,) 4 mg tablet Take 1 Tab by mouth every eight (8) hours as needed for Nausea.  magnesium hydroxide (MILK OF MAGNESIA) 400 mg/5 mL suspension Take 30 mL by mouth nightly.  Morphine (DORIS) 60 mg ER capsule Take 90 mg by mouth daily.  amLODIPine-benazepril (LOTREL) 5-10 mg per capsule Take 1 Cap by mouth daily.  folic acid (FOLVITE) 1 mg tablet Take  by mouth daily.  gabapentin (NEURONTIN) 300 mg capsule Take 300 mg by mouth three (3) times daily.  adalimumab (HUMIRA) 40 mg/0.8 mL injection by SubCUTAneous route once. Past Medical History:   Diagnosis Date    Hypertension     Other ill-defined conditions     chronic pain    Trauma        Past Surgical History:   Procedure Laterality Date    HX ORTHOPAEDIC      repair fracture back       No family history on file. Social History     Social History    Marital status: SINGLE     Spouse name: N/A    Number of children: N/A    Years of education: N/A     Occupational History    Not on file.      Social History Main Topics    Smoking status: Current Every Day Smoker    Smokeless tobacco: Not on file    Alcohol use No    Drug use: No    Sexual activity: Not on file     Other Topics Concern    Not on file     Social History Narrative       Allergies   Allergen Reactions    Penicillins Anaphylaxis       Patient's primary care provider (as noted in EPIC):  Eliel Aguirre MD    REVIEW OF SYSTEMS:  Constitutional:  Negative for diaphoresis. HENT:  Negative for congestion. Respiratory:  Negative for stridor. Cardiovascular:  Negative for palpitations. Gastrointestinal:  Negative for diarrhea. Genitourinary:  Negative for flank pain. Musculoskeletal:  Negative for back pain. Skin:  Negative for pallor. Neurological: Negative. Negative for weakness. There were no vitals taken for this visit. PHYSICAL EXAM:    CONSTITUTIONAL:  Alert, in no apparent distress;  well developed;  well nourished. HEAD:  Normocephalic, atraumatic. EYES:  EOMI. Non-icteric sclera. Normal conjunctiva. ENTM:  Nose:  no rhinorrhea. Throat:  no erythema or exudate, mucous membranes moist.  NECK:  No JVD. Supple    RESPIRATORY:  Diffuse mild end expiratory wheeze, good air movement. CARDIOVASCULAR:  Regular rate and rhythm. No murmurs, rubs, or gallops. GI:  Normal bowel sounds, abdomen soft and non-tender. No rebound or guarding. BACK:  Non-tender. UPPER EXT:  Normal inspection. LOWER EXT:  No edema, no calf tenderness. Distal pulses intact. NEURO:  Moves all four extremities, and grossly normal motor exam.  SKIN:  No rashes;  Normal for age. PSYCH:  Alert and normal affect. DIFFERENTIAL DIAGNOSES/ MEDICAL DECISION MAKING:  Acute asthma exacerbation, acute bronchitis, pneumonia, upper respiratory infection, pulmonary embolism, bronchospasm, various cardiac etiologies verus numerous other etiologies versus combination of the above.     Abnormal lab results from this emergency department encounter:  Labs Reviewed - No data to display    Lab values for this patient within approximately the last 12 hours:  No results found for this or any previous visit (from the past 12 hour(s)). Radiologist and cardiologist interpretations if available at time of this note:  No results found. CXR:  Preliminary review of x-rays by ED Physician. Interpretation of chest X-ray shows, no infiltrates, no pneumothorax, no CHF, no effusion. Medication(s) ordered for patient during this emergency visit encounter:  Medications   albuterol-ipratropium (DUO-NEB) 2.5 MG-0.5 MG/3 ML (not administered)       IMPRESSION AND MEDICAL DECISION MAKING:  Based upon the patient's presentation with noted HPI and PE, along with the work up done in the emergency department, I believe that the patient is having acute bronchitis. DIAGNOSIS:  1. Acute bronchitis. SPECIFIC PATIENT INSTRUCTIONS FROM THE PHYSICIAN WHO TREATED YOU IN THE ER TODAY:  1. Return if any concerns or worsening of condition(s)  2. Zithromax as prescribed until finished. 3.  Use the albuterol inhaler as prescribed for wheezing and/or cough. 4.  Over the counter cough syrup for cough. 5.  FOLLOW UP APPOINTMENT:  Your primary doctor in 1-2 days. Ruba Lozada M.D. Provider Attestation:  If a scribe was utilized in generation of this patient record, I personally performed the services described in the documentation, reviewed the documentation, as recorded by the scribe in my presence, and it accurately records the patient's history of presenting illness, review of systems, patient physical examination, and procedures performed by me as the attending physician. Ruba Lozada M.D. AB Board Certified Emergency Physician  6/14/2017.  6:39 PM    Scribe [address] scribing for and in the presence of Mateo Roberts MD 6:40 PM, 06/14/17.     Physician Attestation  I personally performed the services described in the documentation, reviewed the documentation, as recorded by the scribe in my presence, and it accurately and completely records my words and actions.     Brady Clark MD 6:40 PM 06/14/17        Signed by : Ann Shrestha, 06/14/17 at 6:40 PM

## 2018-06-17 ENCOUNTER — APPOINTMENT (OUTPATIENT)
Dept: GENERAL RADIOLOGY | Age: 58
End: 2018-06-17
Attending: PHYSICIAN ASSISTANT
Payer: SELF-PAY

## 2018-06-17 ENCOUNTER — HOSPITAL ENCOUNTER (EMERGENCY)
Age: 58
Discharge: HOME | End: 2018-06-17
Attending: EMERGENCY MEDICINE
Payer: SELF-PAY

## 2018-06-17 VITALS
TEMPERATURE: 98.1 F | SYSTOLIC BLOOD PRESSURE: 139 MMHG | RESPIRATION RATE: 16 BRPM | OXYGEN SATURATION: 100 % | HEART RATE: 90 BPM | DIASTOLIC BLOOD PRESSURE: 78 MMHG

## 2018-06-17 DIAGNOSIS — R07.89 CHEST WALL PAIN: Primary | ICD-10-CM

## 2018-06-17 PROCEDURE — 71101 X-RAY EXAM UNILAT RIBS/CHEST: CPT

## 2018-06-17 PROCEDURE — 99283 EMERGENCY DEPT VISIT LOW MDM: CPT

## 2018-06-17 PROCEDURE — 93005 ELECTROCARDIOGRAM TRACING: CPT

## 2018-06-17 RX ORDER — METHOCARBAMOL 500 MG/1
500 TABLET, FILM COATED ORAL 4 TIMES DAILY
Qty: 12 TAB | Refills: 0 | Status: SHIPPED | OUTPATIENT
Start: 2018-06-17 | End: 2018-07-06

## 2018-06-17 NOTE — ED NOTES
I performed a brief evaluation, including pertinent history and physical, of the patient here in triage and I have determined that patient will need further treatment and evaluation from the main side ER physician. I have placed initial orders to help in expediting patients care.     June 17, 2018 at 7:51 PM - Nunu Lee PA-C        Visit Vitals    /78    Pulse 90    Temp 98.1 °F (36.7 °C)    Resp 16    SpO2 100%

## 2018-06-17 NOTE — ED PROVIDER NOTES
EMERGENCY DEPARTMENT HISTORY AND PHYSICAL EXAM    1:42 PM      Date: 6/17/2018  Patient Name: Laina Oviedo    History of Presenting Illness     Chief Complaint   Patient presents with    Chest Pain         History Provided By: Patient via     Chief Complaint: Chest pain  Duration:  8 days  Timing:  N/A  Location: Right side of chest radiating to back  Quality: N/A  Severity: N/A  Modifying Factors: The patient states that she tried taking OTC pain medication, but her pain has not been relieved  Associated Symptoms: Headache. Denies nausea and vomiting. Additional History (Context):Radha Humphrey is a 62 y.o. female who presents to the emergency department for evaluation of right sided chest pain that radiates to her back after having a fall 8 days ago. The patient states that she hit her chest on a table in her home. She states she did not hit her head and denies LOC. The patient has an associated symptom of headache. Denies nausea and vomiting. The patient states that she tried taking OTC pain medication, but her pain has not been relieved. PCP:  Vi Garcia MD        Current Outpatient Prescriptions   Medication Sig Dispense Refill    methocarbamol (ROBAXIN) 500 mg tablet Take 1 Tab by mouth four (4) times daily. 12 Tab 0    azithromycin (ZITHROMAX Z-CECE) 250 mg tablet Take two tablets today then one tablet daily 6 Tab 0    albuterol (PROVENTIL HFA, VENTOLIN HFA, PROAIR HFA) 90 mcg/actuation inhaler Take 2 Puffs by inhalation every four (4) hours as needed for Wheezing. 1 Inhaler 0    Morphine (DORIS) 60 mg ER capsule Take 90 mg by mouth daily.  amLODIPine-benazepril (LOTREL) 5-10 mg per capsule Take 1 Cap by mouth daily.  folic acid (FOLVITE) 1 mg tablet Take  by mouth daily.  gabapentin (NEURONTIN) 300 mg capsule Take 300 mg by mouth three (3) times daily.  adalimumab (HUMIRA) 40 mg/0.8 mL injection by SubCUTAneous route once.          Past History     Past Medical History:  Past Medical History:   Diagnosis Date    Hypertension     Other ill-defined conditions(799.89)     chronic pain    Trauma        Past Surgical History:  Past Surgical History:   Procedure Laterality Date    HX ORTHOPAEDIC      repair fracture back       Family History:  History reviewed. No pertinent family history. Social History:  Social History   Substance Use Topics    Smoking status: Never Smoker    Smokeless tobacco: None    Alcohol use No       Allergies: Allergies   Allergen Reactions    Penicillins Anaphylaxis         Review of Systems       Review of Systems   Constitutional: Negative for chills and fever. HENT: Negative for congestion, rhinorrhea and sore throat. Respiratory: Negative for cough and shortness of breath. Cardiovascular: Positive for chest pain. Gastrointestinal: Negative for abdominal pain, blood in stool, constipation, diarrhea, nausea and vomiting. Genitourinary: Negative for dysuria, frequency and hematuria. Musculoskeletal: Positive for back pain. Negative for myalgias. Skin: Negative for rash and wound. Neurological: Positive for headaches. Negative for dizziness. All other systems reviewed and are negative. Physical Exam     Visit Vitals    /78    Pulse 90    Temp 98.1 °F (36.7 °C)    Resp 16    SpO2 100%         Physical Exam   Constitutional: She is oriented to person, place, and time. She appears well-developed and well-nourished. No distress. HENT:   Head: Normocephalic and atraumatic. Eyes: Conjunctivae and EOM are normal. Right eye exhibits no discharge. Left eye exhibits no discharge. Neck: Normal range of motion. Neck supple. No thyromegaly present. Cardiovascular: Normal rate, regular rhythm and normal heart sounds. Pulmonary/Chest: Effort normal and breath sounds normal. No respiratory distress. She has no wheezes. She has no rales. She exhibits tenderness.    Minimally TTP right upper outer anterior chest wall. No erythema, deformity, crepitance, or overlying skin changes. Lungs are CTAB   Musculoskeletal: She exhibits no edema or deformity. Lymphadenopathy:     She has no cervical adenopathy. Neurological: She is alert and oriented to person, place, and time. She has normal reflexes. Skin: Skin is warm and dry. She is not diaphoretic. Psychiatric: She has a normal mood and affect. Nursing note and vitals reviewed. Diagnostic Study Results     Labs -  No results found for this or any previous visit (from the past 12 hour(s)). Radiologic Studies -   Xr Ribs Rt W Pa Cxr Min 3 V    Result Date: 6/17/2018  Right ribs History:  Right chest pain Comparison:  PA and lateral chest 6/14/2017 Findings: PA view of the chest and three additional views of the ribs were performed. No definite rib fracture is seen. Cardiomediastinal silhouette is unremarkable. No focal consolidation, pneumothorax, or pleural effusion is seen. Hardware is present from thoracolumbar fusion involving T11-L1 levels with left lateral plate and screw fixation and evidence of T12 corpectomy with interbody strut graft. Impression: No rib fracture seen. Stable T11-L1 hardware fusion. Medical Decision Making   I am the first provider for this patient. I reviewed the vital signs, available nursing notes, past medical history, past surgical history, family history and social history. Vital Signs-Reviewed the patient's vital signs.     Pulse Oximetry Analysis -  100% on room air (Interpretation)     Records Reviewed: Nursing Notes and Old Medical Records (Time of Review: 1:42 PM)    ED Course: Progress Notes, Reevaluation, and Consults:    Provider Notes (Medical Decision Making):   Differential Diagnosis: Musculoskeletal pain, myofascial strain/sprain, muscle spasm, spondylolisthesis, spondylosis, DJD, OA    Plan:  Pt presents ambulatory, moving BUE and BLE in NAD, well-hydrated, non-toxic in appearance, with normal vitals. No red flags such as saddle paresthesias, SOB, diaphoresis, nausea/vomiting, weakness, bladder/bowel dysfunction, or fever - very low suspicion for epidural abscess, cauda equina, or spinal cord injury. XR negative. Do not feel that any additional emergent imaging is warranted at this time. Exam and HPI consistent with lumbosacral radiculopathy/sciatica/myofascial strain/sprain. Will DC hmoe with robaxin. Explained to patient that this pain may take a few weeks to completely resolve. Will provide work note as necessary. At this time, patient is stable and appropriate for discharge home. Patient demonstrates understanding of current diagnoses and is in agreement with the treatment plan. They are advised that while the likelihood of serious underlying condition is low at this point given the evaluation performed today, we cannot fully rule it out. They are advised to immediately return with any new symptoms or worsening of current condition. All questions have been answered. Patient is given educational material regarding their diagnoses, including danger symptoms and when to return to the ED. Diagnosis     Clinical Impression:   1. Chest wall pain        Disposition: DC Home    Follow-up Information     Follow up With Details Comments [address] [facility] [address] Medina Kan MD Call in 2 days As needed [address] [facility] EMERGENCY DEPT Go to As needed, If symptoms worsen [address]  [phone]           Patient's Medications   Start Taking    METHOCARBAMOL (ROBAXIN) 500 MG TABLET    Take 1 Tab by mouth four (4) times daily. Continue Taking    ADALIMUMAB (HUMIRA) 40 MG/0.8 ML INJECTION    by SubCUTAneous route once. ALBUTEROL (PROVENTIL HFA, VENTOLIN HFA, PROAIR HFA) 90 MCG/ACTUATION INHALER    Take 2 Puffs by inhalation every four (4) hours as needed for Wheezing.     AMLODIPINE-BENAZEPRIL (LOTREL) 5-10 MG PER CAPSULE    Take 1 Cap by mouth daily. AZITHROMYCIN (ZITHROMAX Z-CECE) 250 MG TABLET    Take two tablets today then one tablet daily    FOLIC ACID (FOLVITE) 1 MG TABLET    Take  by mouth daily. GABAPENTIN (NEURONTIN) 300 MG CAPSULE    Take 300 mg by mouth three (3) times daily. MORPHINE (DORIS) 60 MG ER CAPSULE    Take 90 mg by mouth daily. These Medications have changed    No medications on file   Stop Taking    No medications on file     _______________________________      Baldomero Stacy acting as a scribe for and in the presence of Nuha Frances     June 17, 2018 at [facility] PM       Provider Attestation:      I personally performed the services described in the documentation, reviewed the documentation, as recorded by the scribe in my presence, and it accurately and completely records my words and actions.  June 17, 2018 at 3:21 PM - Nuha Frances

## 2018-06-17 NOTE — DISCHARGE INSTRUCTIONS
Dolor de pecho musculoesquelético: Instrucciones de cuidado - [ Musculoskeletal Chest Pain: Care Instructions ]  Instrucciones de cuidado    El dolor de pecho no siempre es jennyfer señal de que haya algo elidia con chun corazón, o de que tenga algún otro problema grave de apollo. Chun médico piensa que chun dolor de pecho es causado por músculos o ligamentos forzados, inflamación del cartílago del pecho, o algún otro problema en el pecho y no en el corazón. Es posible que necesite más pruebas para determinar la causa del dolor de [place]. La atención de seguimiento es jennyfer parte clave de chun tratamiento y seguridad. Asegúrese de hacer y acudir a todas las citas, y llame a chun médico si está teniendo problemas. También es jennyfer buena idea saber los resultados de los exámenes y mantener jennyfer lista de los medicamentos que amie. ¿Cómo puede cuidarse en el hogar? · [employer] analgésicos (medicamentos para el dolor) exactamente emma le fueron indicados. ¨ Si el médico le recetó un analgésico, tómelo según las indicaciones. ¨ Si no está tomando un analgésico recetado, pregúntele a chun médico si puede carmelina kiley de [employer]. · Descanse y proteja la bishop adolorida. · Interrumpa, modifique o suspenda cualquier actividad que pudiera estar causándole el dolor. · Colóquese hielo o jennyfer compresa fría en la bishop adolorida ivette 10 a 20 minutos cada vez. Trate de hacerlo cada 1 a 2 horas ivette los siguientes 3 días (cuando esté despierto) o hasta que la hinchazón baje. Póngase un paño dye entre el hielo y la piel. · Después de 2 ó 3 días, aplíquese jennyfer toalla tibia o jennyfer almohadilla térmica a baja temperatura en la bishop adolorida. Algunos médicos sugieren que se alterne entre tratamientos con calor y frío. · No se envuelva ni se vende con cinta las costillas para sostenerlas. Nectar podría hacer que usted kennedy respiraciones más cortas, lo que podría aumentar chun riesgo de [place].   · Sameer iWOPISophie Bengay o [address] [place], podrían aliviar los músculos adoloridos. Siga las instrucciones del envase. · Siga las instrucciones de chun médico sobre el ejercicio. · El estiramiento y el masaje suaves podrían ayudarle a mejorarse más rápidamente. Estírese despacio hasta el punto antes de que comience el dolor y mantenga el estiramiento ivette al menos 15 a 30 segundos. Tanya esto 3 ó 4 veces al día. Tanya estiramientos después de haberse aplicado calor. · A medida que chun dolor mejore, vuelva poco a poco a dania actividades normales. Si el dolor [place], podría ser jennyfer señal de que necesita descansar ivette [place]. ¿Cuándo debe pedir ayuda? Llame al 911 en cualquier momento que considere que necesita atención de emergencia. Por ejemplo, llame si:  ? · Siente dolor u opresión en el pecho. Estos síntomas podrían estar acompañados de:  ¨ Sudoración. ¨ Falta de aire. ¨ Náuseas o vómito. ¨ Dolor que se extiende del pecho al medardo, la Janiya, o hacia kiley o ambos hombros o [id number]. ¨ Mareo o aturdimiento. ¨ Pulso rápido o irregular. Después de llamar al 911, mastique 1 aspirina para adultos. Espere jennyfer ambulancia. No trate de conducir usted mismo un automóvil. ? · Tiene dolor repentino en el pecho y falta de [place], o tose endy. ?Llame a chun médico ahora mismo o busque atención médica inmediata si:  ? · Tiene cualquier dificultad para respirar. ? · El dolor en el pecho empeora. ? · Chun dolor de pecho aparece constantemente con el ejercicio y se shana con el reposo. ? Preste especial atención a los cambios en chun apollo y asegúrese de comunicarse con chun médico si:  ? · Chun dolor de pecho no mejora después de 1 semana. ¿Dónde puede encontrar más información en inglés? Sharon Scrivener a http://hayley-derrick.info/. Andrew Begum P086 en la búsqueda para aprender más acerca de \"Dolor de pecho musculoesquelético: Instrucciones de cuidado - [ Musculoskeletal Chest Pain: Care Instructions ]. \"  Revisado: 20 marzo, 2017  Versión del contenido: 11.4  © 8946-4594 Healthwise, Incorporated. Las instrucciones de cuidado fueron adaptadas bajo licencia por [employer] (which disclaims liability or warranty for this information). Si usted tiene [place] afección médica o sobre estas instrucciones, siempre pregunte a chun profesional de apollo. Healthwise, Incorporated niega toda garantía o responsabilidad por chun uso de esta información.

## 2018-06-19 LAB
ATRIAL RATE: 84 BPM
CALCULATED P AXIS, ECG09: 52 DEGREES
CALCULATED R AXIS, ECG10: -5 DEGREES
CALCULATED T AXIS, ECG11: 25 DEGREES
DIAGNOSIS, 93000: NORMAL
P-R INTERVAL, ECG05: 156 MS
Q-T INTERVAL, ECG07: 372 MS
QRS DURATION, ECG06: 78 MS
QTC CALCULATION (BEZET), ECG08: 439 MS
VENTRICULAR RATE, ECG03: 84 BPM

## 2018-07-06 ENCOUNTER — HOSPITAL ENCOUNTER (EMERGENCY)
Age: 58
Discharge: HOME | End: 2018-07-06
Attending: EMERGENCY MEDICINE
Payer: SELF-PAY

## 2018-07-06 VITALS
WEIGHT: 164 LBS | HEART RATE: 79 BPM | SYSTOLIC BLOOD PRESSURE: 138 MMHG | RESPIRATION RATE: 16 BRPM | OXYGEN SATURATION: 98 % | BODY MASS INDEX: 32.03 KG/M2 | TEMPERATURE: 98 F | DIASTOLIC BLOOD PRESSURE: 72 MMHG

## 2018-07-06 DIAGNOSIS — L03.119 CELLULITIS OF LOWER EXTREMITY, UNSPECIFIED LATERALITY: ICD-10-CM

## 2018-07-06 DIAGNOSIS — R60.0 BILATERAL LEG EDEMA: Primary | ICD-10-CM

## 2018-07-06 LAB
ALBUMIN SERPL-MCNC: 3.6 G/DL (ref 3.4–5)
ALBUMIN/GLOB SERPL: 1 {RATIO} (ref 0.8–1.7)
ALP SERPL-CCNC: 105 U/L (ref 45–117)
ALT SERPL-CCNC: 23 U/L (ref 13–56)
ANION GAP SERPL CALC-SCNC: 7 MMOL/L (ref 3–18)
APPEARANCE UR: CLEAR
AST SERPL-CCNC: 17 U/L (ref 15–37)
BASOPHILS # BLD: 0.1 K/UL (ref 0–0.06)
BASOPHILS NFR BLD: 1 % (ref 0–2)
BILIRUB SERPL-MCNC: 0.6 MG/DL (ref 0.2–1)
BILIRUB UR QL: NEGATIVE
BNP SERPL-MCNC: 143 PG/ML (ref 0–900)
BUN SERPL-MCNC: 16 MG/DL (ref 7–18)
BUN/CREAT SERPL: 22 (ref 12–20)
CALCIUM SERPL-MCNC: 8.6 MG/DL (ref 8.5–10.1)
CHLORIDE SERPL-SCNC: 106 MMOL/L (ref 100–108)
CO2 SERPL-SCNC: 25 MMOL/L (ref 21–32)
COLOR UR: YELLOW
CREAT SERPL-MCNC: 0.72 MG/DL (ref 0.6–1.3)
DIFFERENTIAL METHOD BLD: ABNORMAL
EOSINOPHIL # BLD: 0.4 K/UL (ref 0–0.4)
EOSINOPHIL NFR BLD: 6 % (ref 0–5)
ERYTHROCYTE [DISTWIDTH] IN BLOOD BY AUTOMATED COUNT: 13.4 % (ref 11.6–14.5)
GLOBULIN SER CALC-MCNC: 3.7 G/DL (ref 2–4)
GLUCOSE SERPL-MCNC: 101 MG/DL (ref 74–99)
GLUCOSE UR STRIP.AUTO-MCNC: NEGATIVE MG/DL
HCT VFR BLD AUTO: 33.8 % (ref 35–45)
HGB BLD-MCNC: 11.4 G/DL (ref 12–16)
HGB UR QL STRIP: NEGATIVE
KETONES UR QL STRIP.AUTO: NEGATIVE MG/DL
LEUKOCYTE ESTERASE UR QL STRIP.AUTO: NEGATIVE
LYMPHOCYTES # BLD: 2.9 K/UL (ref 0.9–3.6)
LYMPHOCYTES NFR BLD: 38 % (ref 21–52)
MCH RBC QN AUTO: 29.9 PG (ref 24–34)
MCHC RBC AUTO-ENTMCNC: 33.7 G/DL (ref 31–37)
MCV RBC AUTO: 88.7 FL (ref 74–97)
MONOCYTES # BLD: 0.7 K/UL (ref 0.05–1.2)
MONOCYTES NFR BLD: 9 % (ref 3–10)
NEUTS SEG # BLD: 3.5 K/UL (ref 1.8–8)
NEUTS SEG NFR BLD: 46 % (ref 40–73)
NITRITE UR QL STRIP.AUTO: NEGATIVE
PH UR STRIP: 6 [PH] (ref 5–8)
PLATELET # BLD AUTO: 261 K/UL (ref 135–420)
PMV BLD AUTO: 8.5 FL (ref 9.2–11.8)
POTASSIUM SERPL-SCNC: 4.4 MMOL/L (ref 3.5–5.5)
PROT SERPL-MCNC: 7.3 G/DL (ref 6.4–8.2)
PROT UR STRIP-MCNC: NEGATIVE MG/DL
RBC # BLD AUTO: 3.81 M/UL (ref 4.2–5.3)
SODIUM SERPL-SCNC: 138 MMOL/L (ref 136–145)
SP GR UR REFRACTOMETRY: 1.01 (ref 1–1.03)
UROBILINOGEN UR QL STRIP.AUTO: 0.2 EU/DL (ref 0.2–1)
WBC # BLD AUTO: 7.6 K/UL (ref 4.6–13.2)

## 2018-07-06 PROCEDURE — 80053 COMPREHEN METABOLIC PANEL: CPT | Performed by: PHYSICIAN ASSISTANT

## 2018-07-06 PROCEDURE — 99283 EMERGENCY DEPT VISIT LOW MDM: CPT

## 2018-07-06 PROCEDURE — 96374 THER/PROPH/DIAG INJ IV PUSH: CPT

## 2018-07-06 PROCEDURE — 81003 URINALYSIS AUTO W/O SCOPE: CPT | Performed by: PHYSICIAN ASSISTANT

## 2018-07-06 PROCEDURE — 74011250636 HC RX REV CODE- 250/636: Performed by: PHYSICIAN ASSISTANT

## 2018-07-06 PROCEDURE — 83880 ASSAY OF NATRIURETIC PEPTIDE: CPT

## 2018-07-06 PROCEDURE — 74011250637 HC RX REV CODE- 250/637: Performed by: PHYSICIAN ASSISTANT

## 2018-07-06 PROCEDURE — 85025 COMPLETE CBC W/AUTO DIFF WBC: CPT | Performed by: PHYSICIAN ASSISTANT

## 2018-07-06 RX ORDER — FUROSEMIDE 20 MG/1
20 TABLET ORAL DAILY
Qty: 7 TAB | Refills: 0 | Status: SHIPPED | OUTPATIENT
Start: 2018-07-06 | End: 2018-07-13

## 2018-07-06 RX ORDER — DOXYCYCLINE 100 MG/1
100 CAPSULE ORAL
Status: DISCONTINUED | OUTPATIENT
Start: 2018-07-06 | End: 2018-07-06 | Stop reason: SDUPTHER

## 2018-07-06 RX ORDER — FUROSEMIDE 10 MG/ML
20 INJECTION INTRAMUSCULAR; INTRAVENOUS
Status: COMPLETED | OUTPATIENT
Start: 2018-07-06 | End: 2018-07-06

## 2018-07-06 RX ORDER — DOXYCYCLINE 100 MG/1
100 CAPSULE ORAL 2 TIMES DAILY
Qty: 20 CAP | Refills: 0 | Status: SHIPPED | OUTPATIENT
Start: 2018-07-06 | End: 2018-07-16

## 2018-07-06 RX ORDER — DOXYCYCLINE 100 MG/1
100 CAPSULE ORAL ONCE
Status: COMPLETED | OUTPATIENT
Start: 2018-07-06 | End: 2018-07-06

## 2018-07-06 RX ADMIN — FUROSEMIDE 20 MG: 10 INJECTION, SOLUTION INTRAMUSCULAR; INTRAVENOUS at 20:31

## 2018-07-06 RX ADMIN — DOXYCYCLINE 100 MG: 100 CAPSULE ORAL at 20:31

## 2018-07-06 NOTE — ED PROVIDER NOTES
EMERGENCY DEPARTMENT HISTORY AND PHYSICAL EXAM    Date: 7/6/2018  Patient Name: Nick Maldonado    History of Presenting Illness     Chief Complaint   Patient presents with    Ankle swelling         History Provided By: Patient    Chief Complaint: leg swelling, rash  Duration: 2 Weeks  Timing:  Gradual, Progressive and Worsening  Location: both lower legs  Quality: Aching  Severity: 5 out of 10  Modifying Factors: None  Associated Symptoms: denies any other associated signs or symptoms      HPI: Nick Maldonado is a 62 y.o. female with a PMH of chronic pain, HTN who presents to the ER c/o lower leg swelling and rash to her lower legs. Patient states her legs started swelling about 2 weeks ago. She then reports they began to develop some redness and irritation. She has not tried anything for her symptoms. She does not take any fluid pills. She denied any recent fevers, SOB, chest pain, changes in weight abd pain, and has no other symptoms or complaints. She rates her pain 5/10. PCP: Javier Dixon MD    Current Outpatient Prescriptions   Medication Sig Dispense Refill    furosemide (LASIX) 20 mg tablet Take 1 Tab by mouth daily for 7 days. 7 Tab 0    doxycycline (MONODOX) 100 mg capsule Take 1 Cap by mouth two (2) times a day for 10 days. 20 Cap 0    albuterol (PROVENTIL HFA, VENTOLIN HFA, PROAIR HFA) 90 mcg/actuation inhaler Take 2 Puffs by inhalation every four (4) hours as needed for Wheezing. 1 Inhaler 0    Morphine (DORIS) 60 mg ER capsule Take 90 mg by mouth daily.  amLODIPine-benazepril (LOTREL) 5-10 mg per capsule Take 1 Cap by mouth daily.  folic acid (FOLVITE) 1 mg tablet Take  by mouth daily.  gabapentin (NEURONTIN) 300 mg capsule Take 300 mg by mouth three (3) times daily.  adalimumab (HUMIRA) 40 mg/0.8 mL injection by SubCUTAneous route once.          Past History     Past Medical History:  Past Medical History:   Diagnosis Date    Hypertension     Other ill-defined conditions(799.89)     chronic pain    Trauma        Past Surgical History:  Past Surgical History:   Procedure Laterality Date    HX ORTHOPAEDIC      repair fracture back       Family History:  History reviewed. No pertinent family history. Social History:  Social History   Substance Use Topics    Smoking status: Never Smoker    Smokeless tobacco: Never Used    Alcohol use No       Allergies: Allergies   Allergen Reactions    Penicillins Anaphylaxis         Review of Systems   Review of Systems   Constitutional: Negative for chills, fatigue and fever. HENT: Negative. Negative for sore throat. Eyes: Negative. Respiratory: Negative for cough and shortness of breath. Cardiovascular: Negative for chest pain and palpitations. Gastrointestinal: Negative for abdominal pain, nausea and vomiting. Genitourinary: Negative for dysuria. Musculoskeletal: Negative. Skin: Positive for rash. Redness, rash and swelling to lower legs     Neurological: Negative for dizziness, weakness, light-headedness and headaches. Psychiatric/Behavioral: Negative. All other systems reviewed and are negative. Physical Exam     Vitals:    07/06/18 1813   BP: 138/72   Pulse: 79   Resp: 16   Temp: 98 °F (36.7 °C)   SpO2: 98%   Weight: 74.4 kg (164 lb)     Physical Exam   Constitutional: She is oriented to person, place, and time. She appears well-developed and well-nourished. No distress. HENT:   Head: Normocephalic and atraumatic. Mouth/Throat: Oropharynx is clear and moist.   Eyes: Conjunctivae are normal. No scleral icterus. Neck: Neck supple. No JVD present. No tracheal deviation present. Cardiovascular: Normal rate, regular rhythm and normal heart sounds. Pulmonary/Chest: Effort normal and breath sounds normal. No respiratory distress. She has no wheezes. Abdominal: Soft. There is no tenderness. Musculoskeletal: Normal range of motion.    Neurological: She is alert and oriented to person, place, and time. She has normal strength. Gait normal. GCS eye subscore is 4. GCS verbal subscore is 5. GCS motor subscore is 6. Skin: Skin is warm and dry. Rash noted. She is not diaphoretic. Psychiatric: She has a normal mood and affect. Nursing note and vitals reviewed. Diagnostic Study Results     Labs -     Recent Results (from the past 12 hour(s))   CBC WITH AUTOMATED DIFF    Collection Time: 07/06/18  6:30 PM   Result Value Ref Range    WBC 7.6 4.6 - 13.2 K/uL    RBC 3.81 (L) 4.20 - 5.30 M/uL    HGB 11.4 (L) 12.0 - 16.0 g/dL    HCT 33.8 (L) 35.0 - 45.0 %    MCV 88.7 74.0 - 97.0 FL    MCH 29.9 24.0 - 34.0 PG    MCHC 33.7 31.0 - 37.0 g/dL    RDW 13.4 11.6 - 14.5 %    PLATELET 898 529 - 064 K/uL    MPV 8.5 (L) 9.2 - 11.8 FL    NEUTROPHILS 46 40 - 73 %    LYMPHOCYTES 38 21 - 52 %    MONOCYTES 9 3 - 10 %    EOSINOPHILS 6 (H) 0 - 5 %    BASOPHILS 1 0 - 2 %    ABS. NEUTROPHILS 3.5 1.8 - 8.0 K/UL    ABS. LYMPHOCYTES 2.9 0.9 - 3.6 K/UL    ABS. MONOCYTES 0.7 0.05 - 1.2 K/UL    ABS. EOSINOPHILS 0.4 0.0 - 0.4 K/UL    ABS. BASOPHILS 0.1 (H) 0.0 - 0.06 K/UL    DF AUTOMATED     METABOLIC PANEL, COMPREHENSIVE    Collection Time: 07/06/18  6:30 PM   Result Value Ref Range    Sodium 138 136 - 145 mmol/L    Potassium 4.4 3.5 - 5.5 mmol/L    Chloride 106 100 - 108 mmol/L    CO2 25 21 - 32 mmol/L    Anion gap 7 3.0 - 18 mmol/L    Glucose 101 (H) 74 - 99 mg/dL    BUN 16 7.0 - 18 MG/DL    Creatinine 0.72 0.6 - 1.3 MG/DL    BUN/Creatinine ratio 22 (H) 12 - 20      GFR est AA >60 >60 ml/min/1.73m2    GFR est non-AA >60 >60 ml/min/1.73m2    Calcium 8.6 8.5 - 10.1 MG/DL    Bilirubin, total 0.6 0.2 - 1.0 MG/DL    ALT (SGPT) 23 13 - 56 U/L    AST (SGOT) 17 15 - 37 U/L    Alk.  phosphatase 105 45 - 117 U/L    Protein, total 7.3 6.4 - 8.2 g/dL    Albumin 3.6 3.4 - 5.0 g/dL    Globulin 3.7 2.0 - 4.0 g/dL    A-G Ratio 1.0 0.8 - 1.7     URINALYSIS W/ RFLX MICROSCOPIC    Collection Time: 07/06/18  6:30 PM Result Value Ref Range    Color YELLOW      Appearance CLEAR      Specific gravity 1.009 1.005 - 1.030      pH (UA) 6.0 5.0 - 8.0      Protein NEGATIVE  NEG mg/dL    Glucose NEGATIVE  NEG mg/dL    Ketone NEGATIVE  NEG mg/dL    Bilirubin NEGATIVE  NEG      Blood NEGATIVE  NEG      Urobilinogen 0.2 0.2 - 1.0 EU/dL    Nitrites NEGATIVE  NEG      Leukocyte Esterase NEGATIVE  NEG     NT-PRO BNP    Collection Time: 07/06/18  6:30 PM   Result Value Ref Range    NT pro- 0 - 900 PG/ML       Radiologic Studies -   No orders to display     CT Results  (Last 48 hours)    None        CXR Results  (Last 48 hours)    None            Medical Decision Making   I am the first provider for this patient. I reviewed the vital signs, available nursing notes, past medical history, past surgical history, family history and social history. Vital Signs-Reviewed the patient's vital signs. Records Reviewed: Nursing Notes and Old Medical Records    ED Course:   8:07 PM  61 y/o female c/o BLLE swelling x 2 weeks with new onset rash. No hx of heart failure in the past.  Denied any SOB, chest pain and well appearing on exam.  Will plan on labs, and treatment for suspected cellulitis. All questions answered and patient in agreement with plan of care. Will plan for discharge. Dandy Doyle PA-C      Disposition:  discharged    DISCHARGE NOTE:       Care plan outlined and precautions discussed. Patient has no new complaints, changes, or physical findings. Results of labs were reviewed with the patient. All medications were reviewed with the patient; will d/c home with doxy and lasix. All of pt's questions and concerns were addressed. Patient was instructed and agrees to follow up with pcp, as well as to return to the ED upon further deterioration. Patient is ready to go home.     Follow-up Information     Follow up With Details Comments Contact Info    [facility] EMERGENCY DEPT  If symptoms worsen [phone] [place]    Jeana Villela MD Call in 3 days ER follow up for leg swelling and cellulitis [facility] [place]  [phone]            Current Discharge Medication List      START taking these medications    Details   furosemide (LASIX) 20 mg tablet Take 1 Tab by mouth daily for 7 days. Qty: 7 Tab, Refills: 0      doxycycline (MONODOX) 100 mg capsule Take 1 Cap by mouth two (2) times a day for 10 days. Qty: 20 Cap, Refills: 0         CONTINUE these medications which have NOT CHANGED    Details   albuterol (PROVENTIL HFA, VENTOLIN HFA, PROAIR HFA) 90 mcg/actuation inhaler Take 2 Puffs by inhalation every four (4) hours as needed for Wheezing. Qty: 1 Inhaler, Refills: 0      Morphine (DORIS) 60 mg ER capsule Take 90 mg by mouth daily. amLODIPine-benazepril (LOTREL) 5-10 mg per capsule Take 1 Cap by mouth daily. folic acid (FOLVITE) 1 mg tablet Take  by mouth daily. gabapentin (NEURONTIN) 300 mg capsule Take 300 mg by mouth three (3) times daily. adalimumab (HUMIRA) 40 mg/0.8 mL injection by SubCUTAneous route once. Provider Notes (Medical Decision Making):     Procedures:  Procedures        Diagnosis     Clinical Impression:   1. Bilateral leg edema    2.  Cellulitis of lower extremity, unspecified laterality

## 2018-07-06 NOTE — ED NOTES
I performed a brief evaluation, including history and physical, of the patient here in triage and I have determined that pt will need further treatment and evaluation from the main side ER physician. I have placed initial orders to help in expediting patients care.      July 06, 2018 at 6:18 PM - LAYLA Mccoy        Visit Vitals    /72 (BP 1 Location: Right arm, BP Patient Position: At rest)    Pulse 79    Temp 98 °F (36.7 °C)    Resp 16    Wt 74.4 kg (164 lb)    SpO2 98%    BMI 32.03 kg/m2

## 2018-07-07 NOTE — ED NOTES
I have reviewed discharge instructions with the patient. The patient verbalized understanding. Patient ambulatory out of ED.

## 2018-07-07 NOTE — DISCHARGE INSTRUCTIONS
Celulitis: Instrucciones de cuidado - [ Cellulitis: Care Instructions ]  Instrucciones de cuidado    La celulitis es jennyfer infección cutánea. Suele producirse tras jennyfer ruptura en la piel por jennyfer raspadura, sundar, mordedura o punción, o tras un salpullido. El médico lo purcell examinado minuciosamente, dakota pueden presentarse problemas más tarde. Si nota algún problema o nuevos síntomas, busque tratamiento médico de inmediato. La atención de seguimiento es jennyfer parte clave de chun tratamiento y seguridad. Asegúrese de hacer y acudir a todas las citas, y llame a chun médico si está teniendo problemas. También es jennyfer buena idea saber los resultados de los exámenes y mantener jennyfer lista de los medicamentos que amie. ¿Cómo puede cuidarse en el hogar? · [place] dania antibióticos de la manera indicada. No deje de tomarlos solo porque se sienta mejor. Debe carmelina todos los antibióticos hasta terminarlos. · Eleve la bishop infectada sobre jennyfer almohada para reducir el dolor y la hinchazón. Trate de mantener la bishop por encima del nivel del corazón tan a menudo emma sea posible. · Si chun médico le dijo cómo cuidarse la herida, siga las instrucciones de chun médico. Si no le jennifer instrucciones, siga estos consejos generales:  ¨ Lávese la herida con agua limpia 2 veces al día. No use peróxido de hidrógeno (agua [place]) ni alcohol, los cuales pueden retrasar la sanación. ¨ Puede cubrirse la herida con jennyfer capa delgada de vaselina y jennyfer venda no adherente. ¨ Aplíquese más vaselina y reemplace la venda según sea necesario. · Sea veena con los medicamentos. [place] los analgésicos (medicamentos para el dolor) exactamente emma le fueron indicados. ¨ Si el médico le jennifer un analgésico recetado, [employer] se lo indicó. ¨ Si usted no está tomando un analgésico recetado, pregúntele a chun médico si puede carmelina un medicamento de [facility].   Para prevenir la celulitis en el futuro  · Trate de evitar los wilder, los rasguños u otras lesiones en la piel. La celulitis suele ocurrir con mayor frecuencia donde haya jennyfer ruptura de la piel. · Si tiene [place], sundar, [place] leve o mordedura, lávese la herida con agua limpia lo antes que pueda para ayudar a evitar jennyfer infección. No use peróxido de hidrógeno (agua [place]) ni alcohol, los cuales pueden retrasar la sanación. · Si tiene hinchazón en las piernas (edema), el uso de medias de soporte y un buen cuidado de la piel pueden ayudarle a prevenir llagas en la piel y celulitis. · Cuide de dania pies, sobre todo si tiene diabetes u otras afecciones que aumenten el riesgo de [place]. Use zapatos y calcetines. No camine descalzo. Si tiene pie de atleta u otros problemas cutáneos en los pies, hable con chun médico de cómo tratarlos. ¿Cuándo debe pedir ayuda? Llame a chun médico ahora mismo o busque atención médica inmediata si:  ? · Tiene señales de que chun infección está empeorando, tales emma:  ¨ Aumento de dolor, hinchazón, temperatura o enrojecimiento. ¨ Vetas rojizas que salen de la bishop. ¨ Pus que sale de la bishop. Wanda Stapler. ? · Tiene un salpullido. ? Vigile de cerca los cambios en chun apollo y asegúrese de comunicarse con chun médico si:  ? · No está mejorando después de 1 día (24 horas). ? · No mejora emma se esperaba. ¿Dónde puede encontrar más información en inglés? Oscar Johnston a http://hayley-derrick.info/. Wyatt Nyhan en la búsqueda para aprender más acerca de \"Celulitis: Instrucciones de cuidado - [ Cellulitis: Care Instructions ]. \"  Revisado: 13 octubre, 2016  Versión del contenido: 11.4  © 4157-3693 Healthwise, Incorporated. Las instrucciones de cuidado fueron adaptadas bajo licencia por [employer] (which disclaims liability or warranty for this information). Si usted tiene [place] afección médica o sobre estas instrucciones, siempre pregunte a chun profesional de apollo.  Healthwise, Incorporated niega toda garantía o responsabilidad por chun uso de esta información. Edema en la pierna y el tobillo: Instrucciones de cuidado - [ Leg and Ankle Edema: Care Instructions ]  Instrucciones de cuidado  La hinchazón en las piernas, los tobillos y los pies se conoce emma edema. Es común después de que usted permanece sentado o de pie ivette un tiempo. Los viajes prolongados en avión o automóvil a menudo causan hinchazón en las piernas y los pies. También podría tener hinchazón si tiene que permanecer de pie ivette largos períodos de tiempo en chun [place]. Los problemas de las venas en las piernas (Dottieette Schlichter) y los cambios hormonales también pueden causar hinchazón. A veces la hinchazón de los tobillos y los pies es causada por un problema más oliva, pedro emma insuficiencia cardíaca, infección, coágulos de [place], o enfermedad del hígado o del Evlyn Jaylin. La atención de seguimiento es jennyfer parte clave de chun tratamiento y seguridad. Asegúrese de hacer y acudir a todas las citas, y llame a chun médico si está teniendo problemas. También es jennyfer buena idea saber los resultados de los exámenes y mantener jennyfer lista de los medicamentos que amie. ¿Cómo puede cuidarse en el hogar? · Si chun médico le recetó un medicamento, tómelo según las indicaciones. Llame a chun médico si buddy estar teniendo problemas con chun medicamento. · Siempre que descanse, eleve dania piernas. Trate de mantener la bishop hinchada por encima del nivel del corazón. · Si permanece mucho tiempo de pie o sentado en la misma posición, tome descansos. ¨ Camine para aumentar el flujo de [employer] parte inferior de las piernas. ¨ Mueva los pies y los tobillos con frecuencia mientras permanezca de pie, o contraiga y relaje los músculos de las piernas. · Use medias elásticas de soporte. Póngaselas en la mañana, antes de que la hinchazón empeore. · Siga jennyfer dieta balanceada. Baje de peso si es necesario. · Limite la cantidad de sal (sodio) en chun dieta.  La sal retiene líquidos en el cuerpo y podría aumentar la hinchazón. ¿Cuándo debe pedir ayuda? Llame al 911 en cualquier momento que considere que necesita atención de emergencia. Por ejemplo, llame si:  ? · Tiene síntomas de un coágulo de endy en el pulmón (llamado embolia pulmonar). Éstos podrían incluir:  ¨ Dolor repentino en el pecho. ¨ Dificultad para respirar. ¨ Tos con endy. ?Llame a chun médico ahora mismo o busque atención médica inmediata si:  ? · Tiene señales de un coágulo de [place], tales emma:  ¨ Dolor en la pantorrilla, el muslo, la rut o detrás de la rodilla. ¨ Enrojecimiento e hinchazón en la pierna o la rut. ? · Tiene síntomas de Ganesh Anand, tales emma:  ¨ Aumento del dolor, la hinchazón, el enrojecimiento o la temperatura. ¨ Vetas rojizas o pus. Debbie Shack. ?Preste especial atención a los cambios en chun apollo y asegúrese de comunicarse con chun médico si:  ? · La hinchazón está empeorando. ? · Tiene dolor nuevo o que empeora en las piernas. ? · No mejora emma se esperaba. ¿Dónde puede encontrar más información en inglés? Sonal Davis a http://hayley-derrick.info/. Billy Goon U080 en la búsqueda para aprender más acerca de \"Edema en la pierna y el tobillo: Instrucciones de cuidado - [ Leg and Ankle Edema: Care Instructions ]. \"  Revisado: 20 Antwon Dasilva 2017  Versión del contenido: 11.4  © 0819-1292 Healthwise, Incorporated. Las instrucciones de cuidado fueron adaptadas bajo licencia por [employer] (which disclaims liability or warranty for this information). Si usted tiene [place] afección médica o sobre estas instrucciones, siempre pregunte a chun profesional de apollo. [facility] niega toda garantía o responsabilidad por chun uso de esta información.

## 2019-02-07 ENCOUNTER — HOSPITAL ENCOUNTER (EMERGENCY)
Age: 59
Discharge: HOME | End: 2019-02-07
Attending: EMERGENCY MEDICINE
Payer: SELF-PAY

## 2019-02-07 VITALS
DIASTOLIC BLOOD PRESSURE: 74 MMHG | OXYGEN SATURATION: 97 % | SYSTOLIC BLOOD PRESSURE: 149 MMHG | HEART RATE: 80 BPM | WEIGHT: 150 LBS | RESPIRATION RATE: 17 BRPM | TEMPERATURE: 97.7 F | BODY MASS INDEX: 29.29 KG/M2

## 2019-02-07 DIAGNOSIS — N89.8 VAGINAL ITCHING: Primary | ICD-10-CM

## 2019-02-07 DIAGNOSIS — R30.0 DYSURIA: ICD-10-CM

## 2019-02-07 LAB
APPEARANCE UR: CLEAR
BACTERIA URNS QL MICRO: NEGATIVE /HPF
BILIRUB UR QL: NEGATIVE
COLOR UR: YELLOW
EPITH CASTS URNS QL MICRO: NORMAL /LPF (ref 0–5)
GLUCOSE UR STRIP.AUTO-MCNC: NEGATIVE MG/DL
HGB UR QL STRIP: ABNORMAL
KETONES UR QL STRIP.AUTO: NEGATIVE MG/DL
LEUKOCYTE ESTERASE UR QL STRIP.AUTO: NEGATIVE
NITRITE UR QL STRIP.AUTO: NEGATIVE
PH UR STRIP: 6.5 [PH] (ref 5–8)
PROT UR STRIP-MCNC: NEGATIVE MG/DL
RBC #/AREA URNS HPF: 0 /HPF (ref 0–5)
SERVICE CMNT-IMP: NORMAL
SP GR UR REFRACTOMETRY: 1.01 (ref 1–1.03)
UROBILINOGEN UR QL STRIP.AUTO: 0.2 EU/DL (ref 0.2–1)
WBC URNS QL MICRO: NORMAL /HPF (ref 0–4)
WET PREP GENITAL: NORMAL

## 2019-02-07 PROCEDURE — 87210 SMEAR WET MOUNT SALINE/INK: CPT

## 2019-02-07 PROCEDURE — 87491 CHLMYD TRACH DNA AMP PROBE: CPT

## 2019-02-07 PROCEDURE — 87086 URINE CULTURE/COLONY COUNT: CPT

## 2019-02-07 PROCEDURE — 99283 EMERGENCY DEPT VISIT LOW MDM: CPT

## 2019-02-07 PROCEDURE — 81001 URINALYSIS AUTO W/SCOPE: CPT

## 2019-02-07 RX ORDER — CIPROFLOXACIN 500 MG/1
500 TABLET ORAL 2 TIMES DAILY
Qty: 6 TAB | Refills: 0 | Status: SHIPPED | OUTPATIENT
Start: 2019-02-07 | End: 2019-02-10

## 2019-02-07 RX ORDER — FLUCONAZOLE 150 MG/1
150 TABLET ORAL
Qty: 1 TAB | Refills: 0 | Status: SHIPPED | OUTPATIENT
Start: 2019-02-07 | End: 2019-02-07

## 2019-02-08 LAB
C TRACH RRNA SPEC QL NAA+PROBE: NEGATIVE
N GONORRHOEA RRNA SPEC QL NAA+PROBE: NEGATIVE
SPECIMEN SOURCE: NORMAL

## 2019-02-08 NOTE — DISCHARGE INSTRUCTIONS
Patient Education        Dolor al orinar (disuria): Instrucciones de cuidado - [ Painful Urination (Dysuria): Care Instructions ]  Instrucciones de cuidado  Sentir ardor al orinar (disuria) es un síntoma común de jennyfer infección de las vías urinarias u otros problemas urinarios. La vejiga puede inflamarse. [place] puede causar dolor al llenarse o vaciarse la vejiga. Usted también puede sentir dolor si el conducto que transporta la orina desde la vejiga hacia afuera del organismo (uretra) se irrita o se infecta. Las infecciones de transmisión sexual (STI, por dania siglas en inglés) también pueden causar dolor al orinar. A veces, el dolor puede ser causado por otras cosas además de jennyfer infección. La uretra puede irritarse a causa de jabones, perfumes u objetos extraños en la uretra. Los cálculos renales pueden causar dolor cuando pasan por la uretra. Puede ser difícil encontrar la causa. Es posible que usted deba hacerse pruebas. El tratamiento para el dolor al orinar depende de la causa. La atención de seguimiento es jennyfer parte clave de chun tratamiento y seguridad. Asegúrese de hacer y acudir a todas las citas, y llame a chun médico si está teniendo problemas. También es jennyfer buena idea saber los resultados de dania exámenes y mantener jennyfer lista de los medicamentos que amie. ¿Cómo puede cuidarse en el hogar? · [employer] agua ivette los siguientes kiley o [address] [place] ayudará a que la orina sea menos concentrada. (Si tiene enfermedad de los riñones, el corazón o el hígado y tiene que Jay's líquidos, hable con chun médico antes de aumentar la cantidad de líquidos que dacia). · Evite las bebidas gaseosas o con cafeína. Pueden irritar la vejiga. · Orine con frecuencia. Trate de vaciar la vejiga cada vez que orine. Para las mujeres:  · Orine inmediatamente después de eduardo tenido relaciones sexuales. · Después de ir al baño, límpiese de adelante hacia atrás.   · Evite el uso de duchas vaginales, los chelsey de burbujas y METHLICK aerosoles de higiene femenina. Y evite otros productos para la higiene femenina que contengan desodorantes. ¿Cuándo debe pedir ayuda? Llame a chun médico ahora mismo o busque atención médica inmediata si:    · Tiene síntomas nuevos emma fiebre, náuseas o vómito.     · Tiene síntomas nuevos o peores de un problema urinario. Por ejemplo:  ? Tiene endy o pus en la orina. ? Tiene escalofríos o le duele el cuerpo. ? Siente más dolor al [place]. ? Tiene dolor en la rut o el abdomen. ? Tiene dolor de espalda eber debajo de la caja torácica (el flanco).    Vigile muy de cerca los cambios en chun apollo, y asegúrese de comunicarse con chun médico si tiene algún problema. ¿Dónde puede encontrar más información en inglés? [place] a http://hayley-derrick.info/. Lonne Sara [address] en la búsqueda para aprender más acerca de \"Dolor al [place] (disuria): Instrucciones de cuidado - [ Painful Urination (Dysuria): Care Instructions ]. \"  Revisado: 20 marzo, 2018  Versión del contenido: 11.9  © 2245-1913 Healthwise, Incorporated. Las instrucciones de cuidado fueron adaptadas bajo licencia por [employer] (which disclaims liability or warranty for this information). Si usted tiene [place] afección médica o sobre estas instrucciones, siempre pregunte a chun profesional de apollo. Healthwise, Incorporated niega toda garantía o responsabilidad por chun uso de esta información.

## 2019-02-08 NOTE — ED PROVIDER NOTES
EMERGENCY DEPARTMENT HISTORY AND PHYSICAL EXAM 
 
Date: 2/7/2019 Patient Name: Henriette Habermann History of Presenting Illness Chief Complaint Patient presents with  Vaginal Itching History Provided By: Patient Chief Complaint: Vaginal itching Duration: 2 Days Timing:  Acute Location: Vagina Severity: Mild Modifying Factors: None reported. Associated Symptoms: denies any other associated signs or symptoms Additional History (Context):  
 
Henriette Habermann is a 62 y.o. female with PMHX of HTN who presents to the emergency department C/O acute and mild vaginal itching. She states mild sx have been going on for 2 days now. Pt denies diarrhea, fever, cold sx, or other associated sx. Also denies smoking and EtOH use. Has PCP but cannot remember his/her name. PCP: Augustine Still MD 
 
Current Outpatient Medications Medication Sig Dispense Refill  ciprofloxacin HCl (CIPRO) 500 mg tablet Take 1 Tab by mouth two (2) times a day for 3 days. 6 Tab 0  
 fluconazole (DIFLUCAN) 150 mg tablet Take 1 Tab by mouth now for 1 dose. 1 Tab 0  
 albuterol (PROVENTIL HFA, VENTOLIN HFA, PROAIR HFA) 90 mcg/actuation inhaler Take 2 Puffs by inhalation every four (4) hours as needed for Wheezing. 1 Inhaler 0  Morphine (DORIS) 60 mg ER capsule Take 90 mg by mouth daily.  amLODIPine-benazepril (LOTREL) 5-10 mg per capsule Take 1 Cap by mouth daily.  folic acid (FOLVITE) 1 mg tablet Take  by mouth daily.  gabapentin (NEURONTIN) 300 mg capsule Take 300 mg by mouth three (3) times daily.  adalimumab (HUMIRA) 40 mg/0.8 mL injection by SubCUTAneous route once. Past History Past Medical History: 
Past Medical History:  
Diagnosis Date  Hypertension  Other ill-defined conditions(569.89) chronic pain  Trauma Past Surgical History: 
Past Surgical History:  
Procedure Laterality Date  HX ORTHOPAEDIC    
 repair fracture back Family History: History reviewed. No pertinent family history. Social History: 
Social History Tobacco Use  Smoking status: Never Smoker  Smokeless tobacco: Never Used Substance Use Topics  Alcohol use: No  
 Drug use: No  
 
 
Allergies: Allergies Allergen Reactions  Penicillins Anaphylaxis Review of Systems Review of Systems Constitutional: Negative for chills, diaphoresis, fever and unexpected weight change. HENT: Negative for congestion, drooling, ear pain, rhinorrhea, sore throat, tinnitus and trouble swallowing. Eyes: Negative for photophobia, pain, redness and visual disturbance. Respiratory: Negative for cough, choking, chest tightness, shortness of breath, wheezing and stridor. Cardiovascular: Negative for chest pain, palpitations and leg swelling. Gastrointestinal: Negative for abdominal distention, abdominal pain, anal bleeding, blood in stool, constipation, diarrhea, nausea and vomiting. Endocrine: Negative for cold intolerance, heat intolerance, polydipsia and polyuria. Genitourinary: Negative for difficulty urinating, dysuria, flank pain, frequency, hematuria and urgency. Positive for vaginal itching Musculoskeletal: Negative for arthralgias, back pain and neck pain. Skin: Negative for color change, rash and wound. Allergic/Immunologic: Negative for immunocompromised state. Neurological: Negative for dizziness, seizures, syncope, speech difficulty, light-headedness and headaches. Hematological: Does not bruise/bleed easily. Psychiatric/Behavioral: Negative for agitation, behavioral problems, hallucinations, self-injury and suicidal ideas. The patient is not hyperactive. All other systems reviewed and are negative. Physical Exam  
 
Vitals:  
 02/07/19 1818 BP: 149/74 Pulse: 80 Resp: 17 Temp: 97.7 °F (36.5 °C) SpO2: 97% Weight: 68 kg (150 lb) Physical Exam  
 Constitutional: She is oriented to person, place, and time. She appears well-developed and well-nourished. No distress. Discomfort during pelvic exam  
HENT:  
Head: Normocephalic and atraumatic. Right Ear: External ear normal.  
Left Ear: External ear normal.  
Mouth/Throat: Oropharynx is clear and moist. No oropharyngeal exudate. Eyes: Conjunctivae and EOM are normal. Pupils are equal, round, and reactive to light. No scleral icterus. No pallor Neck: Normal range of motion. Neck supple. No JVD present. No tracheal deviation present. No thyromegaly present. Cardiovascular: Normal rate, regular rhythm and normal heart sounds. Pulmonary/Chest: Effort normal and breath sounds normal. No stridor. No respiratory distress. Abdominal: Soft. Bowel sounds are normal. She exhibits no distension. There is no tenderness. There is no rebound and no guarding. Genitourinary: There is no rash, tenderness, lesion or injury on the right labia. There is no rash, tenderness, lesion or injury on the left labia. Cervix exhibits no motion tenderness, no discharge and no friability. No erythema, tenderness or bleeding in the vagina. No foreign body in the vagina. No signs of injury around the vagina. No vaginal discharge found. Musculoskeletal: Normal range of motion. She exhibits no edema or tenderness. No soft tissue injuries Lymphadenopathy:  
  She has no cervical adenopathy. Neurological: She is alert and oriented to person, place, and time. She has normal reflexes. No cranial nerve deficit. Coordination normal.  
Skin: Skin is warm and dry. No rash noted. She is not diaphoretic. No erythema. Psychiatric: She has a normal mood and affect. Her behavior is normal. Judgment and thought content normal.  
Nursing note and vitals reviewed. Pelvic Exam, chaperoned by Nilesh Loving Diagnostic Study Results Labs - Recent Results (from the past 12 hour(s)) URINALYSIS W/ RFLX MICROSCOPIC  
 Collection Time: 02/07/19  6:55 PM  
Result Value Ref Range Color YELLOW Appearance CLEAR Specific gravity 1.007 1.005 - 1.030    
 pH (UA) 6.5 5.0 - 8.0 Protein NEGATIVE  NEG mg/dL Glucose NEGATIVE  NEG mg/dL Ketone NEGATIVE  NEG mg/dL Bilirubin NEGATIVE  NEG Blood TRACE (A) NEG Urobilinogen 0.2 0.2 - 1.0 EU/dL Nitrites NEGATIVE  NEG Leukocyte Esterase NEGATIVE  NEG    
URINE MICROSCOPIC ONLY Collection Time: 02/07/19  6:55 PM  
Result Value Ref Range WBC 0 to 2 0 - 4 /hpf  
 RBC 0 0 - 5 /hpf Epithelial cells FEW 0 - 5 /lpf Bacteria NEGATIVE  NEG /hpf WET PREP Collection Time: 02/07/19  7:30 PM  
Result Value Ref Range Special Requests: NO SPECIAL REQUESTS Wet prep NO YEAST,TRICHOMONAS OR CLUE CELLS NOTED Radiologic Studies - No orders to display CT Results  (Last 48 hours) None CXR Results  (Last 48 hours) None Medications given in the ED- Medications - No data to display Medical Decision Making I am the first provider for this patient. I reviewed the vital signs, available nursing notes, past medical history, past surgical history, family history and social history. Vital Signs-Reviewed the patient's vital signs. Pulse Oximetry Analysis - 97% on room air Records Reviewed: Nursing Notes and Old Medical Records Provider Notes (Medical Decision Making): Sx of UTI without actual urinary finding. Symptomatic will treat sx with outpatient follow up. Unlikely to be pelvic internal infection. Specifically PID, colitis, or other GI source. ED Course:  
7:25 PM 
  
Initial assessment performed. The patients presenting problems have been discussed, and they are in agreement with the care plan formulated and outlined with them. I have encouraged them to ask questions as they arise throughout their visit. Diagnosis and Disposition DISCHARGE NOTE: 
8:06 PM 
 Karla Fret  results have been reviewed with her. She has been counseled regarding her diagnosis, treatment, and plan. She verbally conveys understanding and agreement of the signs, symptoms, diagnosis, treatment and prognosis and additionally agrees to follow up as discussed. She also agrees with the care-plan and conveys that all of her questions have been answered. I have also provided discharge instructions for her that include: educational information regarding their diagnosis and treatment, and list of reasons why they would want to return to the ED prior to their follow-up appointment, should her condition change. She has been provided with education for proper emergency department utilization. CLINICAL IMPRESSION: 
 
1. Vaginal itching 2. Dysuria PLAN: 
1. D/C Home 2. Discharge Medication List as of 2/7/2019  7:59 PM  
  
START taking these medications Details  
ciprofloxacin HCl (CIPRO) 500 mg tablet Take 1 Tab by mouth two (2) times a day for 3 days. , Print, Disp-6 Tab, R-0  
  
fluconazole (DIFLUCAN) 150 mg tablet Take 1 Tab by mouth now for 1 dose., Print, Disp-1 Tab, R-0  
  
  
CONTINUE these medications which have NOT CHANGED Details  
albuterol (PROVENTIL HFA, VENTOLIN HFA, PROAIR HFA) 90 mcg/actuation inhaler Take 2 Puffs by inhalation every four (4) hours as needed for Wheezing., Print, Disp-1 Inhaler, R-0 Morphine (DORIS) 60 mg ER capsule Take 90 mg by mouth daily. , Historical Med  
  
amLODIPine-benazepril (LOTREL) 5-10 mg per capsule Take 1 Cap by mouth daily. , Historical Med  
  
folic acid (FOLVITE) 1 mg tablet Take  by mouth daily. , Historical Med  
  
gabapentin (NEURONTIN) 300 mg capsule Take 300 mg by mouth three (3) times daily. , Historical Med  
  
adalimumab (HUMIRA) 40 mg/0.8 mL injection by SubCUTAneous route once., Historical Med 3. Follow-up Information None 
  
 
_______________________________ Attestations: This note is prepared by Suhas Michel, acting as Scribe for Greg Christopher MD. 
 
:  The scribe's documentation has been prepared under my direction and personally reviewed by me in its entirety. I confirm that the note above accurately reflects all work, treatment, procedures, and medical decision making performed by me. 
_______________________________

## 2019-02-09 LAB
BACTERIA SPEC CULT: NORMAL
SERVICE CMNT-IMP: NORMAL

## 2019-04-26 ENCOUNTER — APPOINTMENT (OUTPATIENT)
Dept: GENERAL RADIOLOGY | Age: 59
End: 2019-04-26
Attending: NURSE PRACTITIONER
Payer: SELF-PAY

## 2019-04-26 ENCOUNTER — HOSPITAL ENCOUNTER (EMERGENCY)
Age: 59
Discharge: HOME | End: 2019-04-27
Attending: EMERGENCY MEDICINE | Admitting: EMERGENCY MEDICINE
Payer: SELF-PAY

## 2019-04-26 DIAGNOSIS — J20.9 ACUTE BRONCHITIS, UNSPECIFIED ORGANISM: Primary | ICD-10-CM

## 2019-04-26 DIAGNOSIS — R42 DIZZINESS: ICD-10-CM

## 2019-04-26 LAB
ALBUMIN SERPL-MCNC: 3.4 G/DL (ref 3.4–5)
ALBUMIN/GLOB SERPL: 0.7 {RATIO} (ref 0.8–1.7)
ALP SERPL-CCNC: 128 U/L (ref 45–117)
ALT SERPL-CCNC: 72 U/L (ref 13–56)
ANION GAP SERPL CALC-SCNC: 6 MMOL/L (ref 3–18)
AST SERPL-CCNC: 52 U/L (ref 15–37)
BASOPHILS # BLD: 0 K/UL (ref 0–0.1)
BASOPHILS NFR BLD: 0 % (ref 0–2)
BILIRUB SERPL-MCNC: 0.6 MG/DL (ref 0.2–1)
BUN SERPL-MCNC: 15 MG/DL (ref 7–18)
BUN/CREAT SERPL: 21 (ref 12–20)
CALCIUM SERPL-MCNC: 8.4 MG/DL (ref 8.5–10.1)
CHLORIDE SERPL-SCNC: 106 MMOL/L (ref 100–108)
CK MB CFR SERPL CALC: 0.9 % (ref 0–4)
CK MB SERPL-MCNC: 2.3 NG/ML (ref 5–25)
CK SERPL-CCNC: 265 U/L (ref 26–192)
CO2 SERPL-SCNC: 27 MMOL/L (ref 21–32)
CREAT SERPL-MCNC: 0.72 MG/DL (ref 0.6–1.3)
DIFFERENTIAL METHOD BLD: ABNORMAL
EOSINOPHIL # BLD: 0.4 K/UL (ref 0–0.4)
EOSINOPHIL NFR BLD: 3 % (ref 0–5)
ERYTHROCYTE [DISTWIDTH] IN BLOOD BY AUTOMATED COUNT: 13.3 % (ref 11.6–14.5)
GLOBULIN SER CALC-MCNC: 4.9 G/DL (ref 2–4)
GLUCOSE SERPL-MCNC: 104 MG/DL (ref 74–99)
HCT VFR BLD AUTO: 37.2 % (ref 35–45)
HGB BLD-MCNC: 12 G/DL (ref 12–16)
LIPASE SERPL-CCNC: 166 U/L (ref 73–393)
LYMPHOCYTES # BLD: 3 K/UL (ref 0.9–3.6)
LYMPHOCYTES NFR BLD: 23 % (ref 21–52)
MCH RBC QN AUTO: 29.1 PG (ref 24–34)
MCHC RBC AUTO-ENTMCNC: 32.3 G/DL (ref 31–37)
MCV RBC AUTO: 90.3 FL (ref 74–97)
MONOCYTES # BLD: 0.7 K/UL (ref 0.05–1.2)
MONOCYTES NFR BLD: 6 % (ref 3–10)
NEUTS SEG # BLD: 8.9 K/UL (ref 1.8–8)
NEUTS SEG NFR BLD: 68 % (ref 40–73)
PLATELET # BLD AUTO: 327 K/UL (ref 135–420)
PMV BLD AUTO: 8.6 FL (ref 9.2–11.8)
POTASSIUM SERPL-SCNC: 4 MMOL/L (ref 3.5–5.5)
PROT SERPL-MCNC: 8.3 G/DL (ref 6.4–8.2)
RBC # BLD AUTO: 4.12 M/UL (ref 4.2–5.3)
SODIUM SERPL-SCNC: 139 MMOL/L (ref 136–145)
TROPONIN I SERPL-MCNC: <0.02 NG/ML (ref 0–0.04)
WBC # BLD AUTO: 13 K/UL (ref 4.6–13.2)

## 2019-04-26 PROCEDURE — 71046 X-RAY EXAM CHEST 2 VIEWS: CPT

## 2019-04-26 PROCEDURE — 83690 ASSAY OF LIPASE: CPT

## 2019-04-26 PROCEDURE — 94640 AIRWAY INHALATION TREATMENT: CPT

## 2019-04-26 PROCEDURE — 93005 ELECTROCARDIOGRAM TRACING: CPT

## 2019-04-26 PROCEDURE — 96361 HYDRATE IV INFUSION ADD-ON: CPT

## 2019-04-26 PROCEDURE — 96360 HYDRATION IV INFUSION INIT: CPT

## 2019-04-26 PROCEDURE — 99285 EMERGENCY DEPT VISIT HI MDM: CPT

## 2019-04-26 PROCEDURE — 80053 COMPREHEN METABOLIC PANEL: CPT

## 2019-04-26 PROCEDURE — 74011000250 HC RX REV CODE- 250: Performed by: NURSE PRACTITIONER

## 2019-04-26 PROCEDURE — 77030029684 HC NEB SM VOL KT MONA -A

## 2019-04-26 PROCEDURE — 74011250636 HC RX REV CODE- 250/636: Performed by: NURSE PRACTITIONER

## 2019-04-26 PROCEDURE — 85025 COMPLETE CBC W/AUTO DIFF WBC: CPT

## 2019-04-26 PROCEDURE — 82550 ASSAY OF CK (CPK): CPT

## 2019-04-26 RX ORDER — ALBUTEROL SULFATE 90 UG/1
1 AEROSOL, METERED RESPIRATORY (INHALATION)
Qty: 1 INHALER | Refills: 0 | Status: SHIPPED | OUTPATIENT
Start: 2019-04-26 | End: 2020-02-25

## 2019-04-26 RX ORDER — AZITHROMYCIN 250 MG/1
TABLET, FILM COATED ORAL
Qty: 6 TAB | Refills: 0 | Status: SHIPPED | OUTPATIENT
Start: 2019-04-26 | End: 2019-05-01

## 2019-04-26 RX ORDER — MECLIZINE HCL 12.5 MG 12.5 MG/1
25 TABLET ORAL
Status: COMPLETED | OUTPATIENT
Start: 2019-04-26 | End: 2019-04-26

## 2019-04-26 RX ORDER — IPRATROPIUM BROMIDE AND ALBUTEROL SULFATE 2.5; .5 MG/3ML; MG/3ML
3 SOLUTION RESPIRATORY (INHALATION)
Status: COMPLETED | OUTPATIENT
Start: 2019-04-26 | End: 2019-04-26

## 2019-04-26 RX ORDER — IBUPROFEN 400 MG/1
400 TABLET ORAL
Qty: 20 TAB | Refills: 0 | Status: SHIPPED | OUTPATIENT
Start: 2019-04-26 | End: 2020-02-25

## 2019-04-26 RX ADMIN — MECLIZINE 25 MG: 12.5 TABLET ORAL at 21:39

## 2019-04-26 RX ADMIN — SODIUM CHLORIDE 1000 ML: 900 INJECTION, SOLUTION INTRAVENOUS at 21:43

## 2019-04-26 RX ADMIN — IPRATROPIUM BROMIDE AND ALBUTEROL SULFATE 3 ML: .5; 3 SOLUTION RESPIRATORY (INHALATION) at 21:39

## 2019-04-26 NOTE — ED TRIAGE NOTES
Pt arrived through triage with multiple complaints. Pt is only [place] speaking so  phone used to communicate with pt. Pt c/o cough, shortness of breath, dizziness, and watery eyes x 2 days.

## 2019-04-27 VITALS
HEART RATE: 90 BPM | SYSTOLIC BLOOD PRESSURE: 138 MMHG | RESPIRATION RATE: 12 BRPM | TEMPERATURE: 97.8 F | DIASTOLIC BLOOD PRESSURE: 55 MMHG | OXYGEN SATURATION: 95 %

## 2019-04-27 NOTE — DISCHARGE INSTRUCTIONS
DTT Activation    Thank you for requesting access to DTT. Please follow the instructions below to securely access and download your online medical record. DTT allows you to send messages to your doctor, view your test results, renew your prescriptions, schedule appointments, and more. How Do I Sign Up? 1. In your internet browser, go to [email or web address]  2. Click on the First Time User? Click Here link in the Sign In box. You will be redirect to the New Member Sign Up page. 3. Enter your DTT Access Code exactly as it appears below. You will not need to use this code after youve completed the sign-up process. If you do not sign up before the expiration date, you must request a new code. DTT Access Code: [email or web address]  Expires: 6/10/2019  7:33 PM (This is the date your DTT access code will )    4. Enter the last four digits of your Social Security Number (xxxx) and Date of Birth (mm/dd/yyyy) as indicated and click Submit. You will be taken to the next sign-up page. 5. Create a DTT ID. This will be your DTT login ID and cannot be changed, so think of one that is secure and easy to remember. 6. Create a DTT password. You can change your password at any time. 7. Enter your Password Reset Question and Answer. This can be used at a later time if you forget your password. 8. Enter your e-mail address. You will receive e-mail notification when new information is available in [address] 9. Click Sign Up. You can now view and download portions of your medical record. 10. Click the Download Summary menu link to download a portable copy of your medical information. Additional Information    If you have questions, please visit the Frequently Asked Questions section of the DTT website at [email or web address]/[address] [employer]/. Remember, DTT is NOT to be used for urgent needs. For medical emergencies, dial 911.

## 2019-04-27 NOTE — ED NOTES
I have reviewed discharge instructions with the patient. The patient verbalized understanding. Patient armband removed and shredded Pt ambulatory to ED janes.

## 2019-04-27 NOTE — ED PROVIDER NOTES
This is a 62year old female who presents to ED with CP and SOB accompanied by cough  X 2 days. Pt denies productive cough, pain 4/10 with inspiration. Denies n/v, endorses fever yesterday but took Tylenol which offered some relief. Past Medical History:  
Diagnosis Date  Hypertension  Other ill-defined conditions(529.89) chronic pain  Trauma Past Surgical History:  
Procedure Laterality Date  HX ORTHOPAEDIC    
 repair fracture back No family history on file. Social History Socioeconomic History  Marital status: SINGLE Spouse name: Not on file  Number of children: Not on file  Years of education: Not on file  Highest education level: Not on file Occupational History  Not on file Social Needs  Financial resource strain: Not on file  Food insecurity:  
  Worry: Not on file Inability: Not on file  Transportation needs:  
  Medical: Not on file Non-medical: Not on file Tobacco Use  Smoking status: Never Smoker  Smokeless tobacco: Never Used Substance and Sexual Activity  Alcohol use: No  
 Drug use: No  
 Sexual activity: Not on file Lifestyle  Physical activity:  
  Days per week: Not on file Minutes per session: Not on file  Stress: Not on file Relationships  Social connections:  
  Talks on phone: Not on file Gets together: Not on file Attends Quaker service: Not on file Active member of club or organization: Not on file Attends meetings of clubs or organizations: Not on file Relationship status: Not on file  Intimate partner violence:  
  Fear of current or ex partner: Not on file Emotionally abused: Not on file Physically abused: Not on file Forced sexual activity: Not on file Other Topics Concern  Not on file Social History Narrative  Not on file ALLERGIES: Penicillins Review of Systems Constitutional: Negative. HENT: Negative. Eyes: Negative. Respiratory: Positive for cough and shortness of breath. Cardiovascular: Negative. Gastrointestinal: Negative. Endocrine: Negative. Genitourinary: Negative. Musculoskeletal: Negative. Skin: Negative. Allergic/Immunologic: Negative. Neurological: Negative. Hematological: Negative. Psychiatric/Behavioral: Negative. Vitals:  
 04/26/19 1959 04/26/19 2000 04/26/19 2015 04/26/19 2045 BP:  125/55 134/64 128/49 Pulse:  80 81 79 Resp:  16 12 11 Temp:      
SpO2: 98% 98% 97% 96% Physical Exam  
Constitutional: She appears well-developed and well-nourished. No distress. HENT:  
Head: Normocephalic and atraumatic. Nose: Nose normal.  
Eyes: EOM are normal. Right eye exhibits no discharge. Left eye exhibits no discharge. No scleral icterus. Neck: Normal range of motion. Neck supple. No tracheal deviation present. Cardiovascular: Normal rate, regular rhythm and intact distal pulses. Pulmonary/Chest: Effort normal and breath sounds normal.  
Abdominal: Soft. She exhibits no distension. Genitourinary:  
Genitourinary Comments: NE 
  
Musculoskeletal: Normal range of motion. She exhibits no deformity. Neurological: She is alert. Coordination normal.  
Skin: Skin is warm and dry. NE. Psychiatric: She has a normal mood and affect. Her behavior is normal.  
Nursing note and vitals reviewed. MDM Number of Diagnoses or Management Options Diagnosis management comments: MDM: Will order CXR and EKG. PROGRESS NOTE:  The plain film XR was reviewed. Amount and/or Complexity of Data Reviewed Clinical lab tests: ordered and reviewed Tests in the radiology section of CPT®: ordered and reviewed Diagnosis: 1. Acute bronchitis, unspecified organism 2. Dizziness Disposition:   Discharged to Home. Follow-up Information Follow up With Specialties Details Why Contact Info Elkin Reza MD Physical Medicine and Rehab Call to arrange follow up   [address] [id number] 
[phone] Patient's Medications Start Taking ALBUTEROL (PROVENTIL HFA, VENTOLIN HFA, PROAIR HFA) 90 MCG/ACTUATION INHALER    Take 1 Puff by inhalation every four (4) hours as needed for Wheezing. AZITHROMYCIN (ZITHROMAX Z-CECE) 250 MG TABLET    Take two tabs po day one; Take one tab po days two through five. IBUPROFEN (MOTRIN) 400 MG TABLET    Take 1 Tab by mouth every six (6) hours as needed for Pain. Continue Taking ADALIMUMAB (HUMIRA) 40 MG/0.8 ML INJECTION    by SubCUTAneous route once. AMLODIPINE-BENAZEPRIL (LOTREL) 5-10 MG PER CAPSULE    Take 1 Cap by mouth daily. FOLIC ACID (FOLVITE) 1 MG TABLET    Take  by mouth daily. GABAPENTIN (NEURONTIN) 300 MG CAPSULE    Take 300 mg by mouth three (3) times daily. MORPHINE (DORIS) 60 MG ER CAPSULE    Take 90 mg by mouth daily. These Medications have changed No medications on file Stop Taking ALBUTEROL (PROVENTIL HFA, VENTOLIN HFA, PROAIR HFA) 90 MCG/ACTUATION INHALER    Take 2 Puffs by inhalation every four (4) hours as needed for Wheezing.

## 2019-04-28 LAB
ATRIAL RATE: 81 BPM
CALCULATED P AXIS, ECG09: 57 DEGREES
CALCULATED R AXIS, ECG10: -9 DEGREES
CALCULATED T AXIS, ECG11: 40 DEGREES
DIAGNOSIS, 93000: NORMAL
P-R INTERVAL, ECG05: 168 MS
Q-T INTERVAL, ECG07: 362 MS
QRS DURATION, ECG06: 78 MS
QTC CALCULATION (BEZET), ECG08: 420 MS
VENTRICULAR RATE, ECG03: 81 BPM

## 2019-11-15 ENCOUNTER — HOSPITAL ENCOUNTER (EMERGENCY)
Age: 59
Discharge: HOME | End: 2019-11-15
Attending: EMERGENCY MEDICINE
Payer: SELF-PAY

## 2019-11-15 ENCOUNTER — APPOINTMENT (OUTPATIENT)
Dept: GENERAL RADIOLOGY | Age: 59
End: 2019-11-15
Attending: PHYSICIAN ASSISTANT
Payer: SELF-PAY

## 2019-11-15 VITALS
DIASTOLIC BLOOD PRESSURE: 77 MMHG | TEMPERATURE: 99 F | SYSTOLIC BLOOD PRESSURE: 153 MMHG | RESPIRATION RATE: 18 BRPM | HEART RATE: 89 BPM | OXYGEN SATURATION: 100 %

## 2019-11-15 DIAGNOSIS — J20.9 ACUTE BRONCHITIS, UNSPECIFIED ORGANISM: Primary | ICD-10-CM

## 2019-11-15 PROCEDURE — 74011636637 HC RX REV CODE- 636/637: Performed by: PHYSICIAN ASSISTANT

## 2019-11-15 PROCEDURE — 94640 AIRWAY INHALATION TREATMENT: CPT

## 2019-11-15 PROCEDURE — 99283 EMERGENCY DEPT VISIT LOW MDM: CPT

## 2019-11-15 PROCEDURE — 74011000250 HC RX REV CODE- 250: Performed by: PHYSICIAN ASSISTANT

## 2019-11-15 PROCEDURE — 77030029684 HC NEB SM VOL KT MONA -A

## 2019-11-15 PROCEDURE — 71046 X-RAY EXAM CHEST 2 VIEWS: CPT

## 2019-11-15 RX ORDER — IPRATROPIUM BROMIDE AND ALBUTEROL SULFATE 2.5; .5 MG/3ML; MG/3ML
3 SOLUTION RESPIRATORY (INHALATION) ONCE
Status: COMPLETED | OUTPATIENT
Start: 2019-11-15 | End: 2019-11-15

## 2019-11-15 RX ORDER — DOXYCYCLINE 100 MG/1
100 CAPSULE ORAL 2 TIMES DAILY
Qty: 14 CAP | Refills: 0 | Status: SHIPPED | OUTPATIENT
Start: 2019-11-15 | End: 2019-11-22

## 2019-11-15 RX ORDER — ALBUTEROL SULFATE 90 UG/1
2 AEROSOL, METERED RESPIRATORY (INHALATION)
Qty: 1 INHALER | Refills: 0 | Status: SHIPPED | OUTPATIENT
Start: 2019-11-15

## 2019-11-15 RX ORDER — PREDNISONE 20 MG/1
60 TABLET ORAL
Status: COMPLETED | OUTPATIENT
Start: 2019-11-15 | End: 2019-11-15

## 2019-11-15 RX ADMIN — IPRATROPIUM BROMIDE AND ALBUTEROL SULFATE 3 ML: .5; 3 SOLUTION RESPIRATORY (INHALATION) at 17:37

## 2019-11-15 RX ADMIN — PREDNISONE 60 MG: 20 TABLET ORAL at 17:37

## 2019-11-15 NOTE — DISCHARGE INSTRUCTIONS
Patient Education     Please return immediately to the Emergency Room for re-evaluation if you are not improving, develop any new symptoms, or develop worsening of current symptoms! If you have been prescribed a medication and are unable to take this medication for any reason, please return to the Emergency Department for further evaluation! If you have been referred for follow-up to a specialist, but are unable to follow-up and your symptoms are either not improving or are worsening, please return to the Emergency Department for further evaluation! Bronquitis: Instrucciones de cuidado - [ Bronchitis: Care Instructions ]  Instrucciones de cuidado    La bronquitis es jennyfer inflamación de los bronquios (conductos bronquiales), que llevan aire a los pulmones. Los tubos se hinchan y producen mucosidad, o flema. La mucosidad y los bronquios inflamados hacen que tosa. Es posible que tenga problemas para respirar. Gini Render de los casos de bronquitis son causados por virus emma los que causan los resfriados. Los antibióticos generalmente no ayudan y pueden ser dañinos. La bronquitis suele aparecer con Abhishek Freed y dura alrededor de 2 a 3 semanas en personas que por lo demás son saludables. La atención de seguimiento es jennyfer parte clave de chun tratamiento y seguridad. Asegúrese de hacer y acudir a todas las citas, y llame a chun médico si está teniendo problemas. También es jennyfer buena idea saber los resultados de dania exámenes y mantener jennyfer lista de los medicamentos que amie. ¿Cómo puede cuidarse en el hogar? · [employer] medicamentos exactamente emma le fueron recetados. Llame a chun médico si buddy que está teniendo un problema con dania medicamentos. · Descanse un poco más. · [place] un analgésico (medicamento para el dolor) de venta demetri, emma acetaminofén (Tylenol), ibuprofeno (Advil, Motrin) o naproxeno (Aleve), para reducir la fiebre y [employer] rashad en el cuerpo.  Sara y siga todas las instrucciones de la etiqueta. · No tome dos o más analgésicos al mismo tiempo a menos que el médico se lo haya indicado. Muchos analgésicos contienen acetaminofén, es decir, Tylenol. El exceso de acetaminofén (Tylenol) puede ser dañino. · [place] un medicamento para la tos de venta demetri que contenga dextrometorfano para ayudar a aliviar la tos seca y persistente y así poder dormir. [employer] los medicamentos para la tos que tengan más de un ingrediente [place]. Sara y siga todas las instrucciones de la [place]. · Respire aire húmedo de un humidificador, ducha caliente o lavabo lleno de [place]. El calor y la humedad adelgazarán la mucosidad para que pueda expulsarla. · No fume. Fumar puede empeorar la bronquitis. Si necesita ayuda para dejar de fumar, hable con chun médico sobre programas y medicamentos para dejar de fumar. Estos pueden aumentar dania probabilidades de dejar el hábito para siempre. ¿Cuándo debe pedir ayuda? Llame al 911 en cualquier momento que considere que necesita atención de emergencia. Por ejemplo, llame si:    · Tiene serias dificultades para respirar.    Llame a chun médico ahora mismo o busque atención médica inmediata si:    · Tiene nueva o peor dificultad para respirar.     · Tose mucosidad (esputo) de color café oscuro o con endy.     · Tiene jennyfer fiebre nueva o más glenny.     · Tiene un salpullido nuevo.    Preste especial atención a los cambios en chun apollo y asegúrese de comunicarse con chun médico si:    · Chun tos es más profunda o más frecuente que antes, especialmente si nota más mucosidad o un cambio en el color de la mucosidad.     · No mejora emma se esperaba. ¿Dónde puede encontrar más información en inglés? India Cunningham a http://hayley-derrick.info/. Escriba H333 en la búsqueda para aprender más acerca de \"Bronquitis: Instrucciones de cuidado - [ Bronchitis: Care Instructions ]. \"  Revisado: 9 junio, 2019  Versión del contenido: 12.2  © 2365-9374 [employer].  Las instrucciones de cuidado fueron adaptadas bajo licencia por [employer] (which disclaims liability or warranty for this information). Si usted tiene [place] afección médica o sobre estas instrucciones, siempre pregunte a chun profesional de apollo. [facility] niega toda garantía o responsabilidad por chun uso de esta información.

## 2019-11-15 NOTE — ED PROVIDER NOTES
EMERGENCY DEPARTMENT HISTORY AND PHYSICAL EXAM    5:30 PM      Date: 11/15/2019  Patient Name: José Miguel Erickson    History of Presenting Illness     Chief Complaint   Patient presents with    Cough       History Provided By: Patient    Chief Complaint: cough, loss of voice, fever  Duration: 4 Days  Timing:  Acute  Location:   Quality: N/A  Severity: N/A  Modifying Factors: none  Associated Symptoms: denies any other associated signs or symptoms      Additional History (Context):Maria S Mikael Apley is a 61 y.o. female who presents to the emergency department for evaluation of cough, fever, and scratchy voice x4 days. Patient reports no ill contacts. No associated rhinorrhea or congestion. No associated chest pain, shortness of breath, chills, or any other complaints. No treatments prior to arrival.      PCP:  Law Vital MD        Current Outpatient Medications   Medication Sig Dispense Refill    doxycycline (MONODOX) 100 mg capsule Take 1 Cap by mouth two (2) times a day for 7 days. 14 Cap 0    albuterol (PROVENTIL HFA, VENTOLIN HFA, PROAIR HFA) 90 mcg/actuation inhaler Take 2 Puffs by inhalation every four (4) hours as needed for Wheezing. 1 Inhaler 0    albuterol (PROVENTIL HFA, VENTOLIN HFA, PROAIR HFA) 90 mcg/actuation inhaler Take 1 Puff by inhalation every four (4) hours as needed for Wheezing. 1 Inhaler 0    ibuprofen (MOTRIN) 400 mg tablet Take 1 Tab by mouth every six (6) hours as needed for Pain. 20 Tab 0    Morphine (DORIS) 60 mg ER capsule Take 90 mg by mouth daily.  amLODIPine-benazepril (LOTREL) 5-10 mg per capsule Take 1 Cap by mouth daily.  folic acid (FOLVITE) 1 mg tablet Take  by mouth daily.  gabapentin (NEURONTIN) 300 mg capsule Take 300 mg by mouth three (3) times daily.  adalimumab (HUMIRA) 40 mg/0.8 mL injection by SubCUTAneous route once.          Past History     Past Medical History:  Past Medical History:   Diagnosis Date    Hypertension     Other ill-defined conditions(799.89)     chronic pain    Trauma        Past Surgical History:  Past Surgical History:   Procedure Laterality Date    HX ORTHOPAEDIC      repair fracture back       Family History:  History reviewed. No pertinent family history. Social History:  Social History     Tobacco Use    Smoking status: Never Smoker    Smokeless tobacco: Never Used   Substance Use Topics    Alcohol use: No    Drug use: No       Allergies: Allergies   Allergen Reactions    Penicillins Anaphylaxis         Review of Systems       Review of Systems   Constitutional: Positive for fever. Negative for chills. HENT: Positive for voice change. Negative for congestion, rhinorrhea and sore throat. Respiratory: Positive for cough. Negative for shortness of breath. Cardiovascular: Negative for chest pain. Gastrointestinal: Negative for abdominal pain, blood in stool, constipation, diarrhea, nausea and vomiting. Genitourinary: Negative for dysuria, frequency and hematuria. Musculoskeletal: Negative for back pain and myalgias. Skin: Negative for rash and wound. Neurological: Negative for dizziness and headaches. All other systems reviewed and are negative. Physical Exam     Visit Vitals  /77   Pulse 89   Temp 99 °F (37.2 °C)   Resp 18   SpO2 100%     Physical Exam   Constitutional: She is oriented to person, place, and time. She appears well-developed and well-nourished. No distress. HENT:   Head: Normocephalic and atraumatic. Hoarse voice. Unremarkable ENT exam   Eyes: Conjunctivae are normal.   Neck: Normal range of motion. Neck supple. No thyromegaly present. Cardiovascular: Normal rate, regular rhythm and normal heart sounds. Pulmonary/Chest: Effort normal and breath sounds normal. No respiratory distress. She exhibits no tenderness. Abdominal: Soft. Bowel sounds are normal. She exhibits no distension. There is no tenderness. There is no rebound and no guarding. Musculoskeletal: She exhibits no edema or deformity. Lymphadenopathy:     She has no cervical adenopathy. Neurological: She is alert and oriented to person, place, and time. She has normal reflexes. Skin: Skin is warm and dry. She is not diaphoretic. Psychiatric: She has a normal mood and affect. Nursing note and vitals reviewed. Diagnostic Study Results     Labs -  No results found for this or any previous visit (from the past 12 hour(s)). Radiologic Studies -   No results found. Medical Decision Making   I am the first provider for this patient. I reviewed the vital signs, available nursing notes, past medical history, past surgical history, family history and social history. Vital Signs-Reviewed the patient's vital signs. Pulse Oximetry Analysis -  100% on room air (Interpretation)    Records Reviewed: Nursing Notes and Old Medical Records (Time of Review: 5:30 PM)    ED Course: Progress Notes, Reevaluation, and Consults:    Provider Notes (Medical Decision Making):   differential diagnosis:  Bronchitis, URI, PNA, allergic rhinitis    Plan: Pt presents ambulatory in NAD, vitals wnl. Exam c/w bronchitis versus PNA. Possible RML opacity versus artifact. Will cover with doxy and albuterol. Treated with albuterol and prednisone here. At this time, patient is stable and appropriate for discharge home. Patient demonstrates understanding of current diagnoses and is in agreement with the treatment plan. They are advised that while the likelihood of serious underlying condition is low at this point given the evaluation performed today, we cannot fully rule it out. They are advised to immediately return with any new symptoms or worsening of current condition. All questions have been answered. Patient is given educational material regarding their diagnoses, including danger symptoms and when to return to the ED. Diagnosis     Clinical Impression:   1.  Acute bronchitis, unspecified organism        Disposition: DC Home    Follow-up Information     Follow up With Specialties Details Why Contact Info    Barbara Ramirez MD Physical Medicine and Rehab Call in 2 days For follow-up appointment to determine resolution of pneumonia [address] EMERGENCY DEPT Emergency Medicine   [address]  [phone]           Patient's Medications   Start Taking    ALBUTEROL (PROVENTIL HFA, VENTOLIN HFA, PROAIR HFA) 90 MCG/ACTUATION INHALER    Take 2 Puffs by inhalation every four (4) hours as needed for Wheezing. DOXYCYCLINE (MONODOX) 100 MG CAPSULE    Take 1 Cap by mouth two (2) times a day for 7 days. Continue Taking    ADALIMUMAB (HUMIRA) 40 MG/0.8 ML INJECTION    by SubCUTAneous route once. ALBUTEROL (PROVENTIL HFA, VENTOLIN HFA, PROAIR HFA) 90 MCG/ACTUATION INHALER    Take 1 Puff by inhalation every four (4) hours as needed for Wheezing. AMLODIPINE-BENAZEPRIL (LOTREL) 5-10 MG PER CAPSULE    Take 1 Cap by mouth daily. FOLIC ACID (FOLVITE) 1 MG TABLET    Take  by mouth daily. GABAPENTIN (NEURONTIN) 300 MG CAPSULE    Take 300 mg by mouth three (3) times daily. IBUPROFEN (MOTRIN) 400 MG TABLET    Take 1 Tab by mouth every six (6) hours as needed for Pain. MORPHINE (DORIS) 60 MG ER CAPSULE    Take 90 mg by mouth daily. These Medications have changed    No medications on file   Stop Taking    No medications on file     _______________________________    This note was dictated utilizing voice recognition software which may lead to typographical errors. I apologize in advance if the situation occurs. If questions arise please do not hesitate to contact me or call our department.   Meredith Pinto PA-C

## 2019-12-20 ENCOUNTER — APPOINTMENT (OUTPATIENT)
Dept: CT IMAGING | Age: 59
End: 2019-12-20
Attending: EMERGENCY MEDICINE
Payer: SELF-PAY

## 2019-12-20 ENCOUNTER — HOSPITAL ENCOUNTER (EMERGENCY)
Age: 59
Discharge: HOME | End: 2019-12-20
Attending: EMERGENCY MEDICINE
Payer: SELF-PAY

## 2019-12-20 ENCOUNTER — APPOINTMENT (OUTPATIENT)
Dept: GENERAL RADIOLOGY | Age: 59
End: 2019-12-20
Attending: PHYSICIAN ASSISTANT
Payer: SELF-PAY

## 2019-12-20 VITALS
HEIGHT: 62 IN | TEMPERATURE: 98.8 F | OXYGEN SATURATION: 96 % | SYSTOLIC BLOOD PRESSURE: 121 MMHG | RESPIRATION RATE: 10 BRPM | WEIGHT: 150 LBS | BODY MASS INDEX: 27.6 KG/M2 | HEART RATE: 87 BPM | DIASTOLIC BLOOD PRESSURE: 55 MMHG

## 2019-12-20 DIAGNOSIS — J18.9 MULTIFOCAL PNEUMONIA: Primary | ICD-10-CM

## 2019-12-20 LAB
ALBUMIN SERPL-MCNC: 3.4 G/DL (ref 3.4–5)
ALBUMIN/GLOB SERPL: 0.9 {RATIO} (ref 0.8–1.7)
ALP SERPL-CCNC: 118 U/L (ref 45–117)
ALT SERPL-CCNC: 66 U/L (ref 13–56)
ANION GAP SERPL CALC-SCNC: 5 MMOL/L (ref 3–18)
APPEARANCE UR: CLEAR
AST SERPL-CCNC: 55 U/L (ref 10–38)
BASOPHILS # BLD: 0 K/UL (ref 0–0.1)
BASOPHILS NFR BLD: 0 % (ref 0–2)
BILIRUB SERPL-MCNC: 0.8 MG/DL (ref 0.2–1)
BILIRUB UR QL: NEGATIVE
BUN SERPL-MCNC: 16 MG/DL (ref 7–18)
BUN/CREAT SERPL: 15 (ref 12–20)
CALCIUM SERPL-MCNC: 8.5 MG/DL (ref 8.5–10.1)
CHLORIDE SERPL-SCNC: 102 MMOL/L (ref 100–111)
CO2 SERPL-SCNC: 27 MMOL/L (ref 21–32)
COLOR UR: YELLOW
CREAT SERPL-MCNC: 1.08 MG/DL (ref 0.6–1.3)
D DIMER PPP FEU-MCNC: 0.9 UG/ML(FEU)
DIFFERENTIAL METHOD BLD: ABNORMAL
EOSINOPHIL # BLD: 0.1 K/UL (ref 0–0.4)
EOSINOPHIL NFR BLD: 0 % (ref 0–5)
ERYTHROCYTE [DISTWIDTH] IN BLOOD BY AUTOMATED COUNT: 13.3 % (ref 11.6–14.5)
GLOBULIN SER CALC-MCNC: 3.7 G/DL (ref 2–4)
GLUCOSE SERPL-MCNC: 130 MG/DL (ref 74–99)
GLUCOSE UR STRIP.AUTO-MCNC: NEGATIVE MG/DL
HCT VFR BLD AUTO: 35.2 % (ref 35–45)
HGB BLD-MCNC: 11.3 G/DL (ref 12–16)
HGB UR QL STRIP: NEGATIVE
KETONES UR QL STRIP.AUTO: NEGATIVE MG/DL
LACTATE BLD-SCNC: 1.18 MMOL/L (ref 0.4–2)
LEUKOCYTE ESTERASE UR QL STRIP.AUTO: NEGATIVE
LIPASE SERPL-CCNC: 82 U/L (ref 73–393)
LYMPHOCYTES # BLD: 3.1 K/UL (ref 0.9–3.6)
LYMPHOCYTES NFR BLD: 16 % (ref 21–52)
MCH RBC QN AUTO: 28.7 PG (ref 24–34)
MCHC RBC AUTO-ENTMCNC: 32.1 G/DL (ref 31–37)
MCV RBC AUTO: 89.3 FL (ref 74–97)
MONOCYTES # BLD: 1.1 K/UL (ref 0.05–1.2)
MONOCYTES NFR BLD: 6 % (ref 3–10)
NEUTS SEG # BLD: 15.2 K/UL (ref 1.8–8)
NEUTS SEG NFR BLD: 78 % (ref 40–73)
NITRITE UR QL STRIP.AUTO: NEGATIVE
PH UR STRIP: 6 [PH] (ref 5–8)
PLATELET # BLD AUTO: 332 K/UL (ref 135–420)
PMV BLD AUTO: 8.7 FL (ref 9.2–11.8)
POTASSIUM SERPL-SCNC: 4.3 MMOL/L (ref 3.5–5.5)
PROT SERPL-MCNC: 7.1 G/DL (ref 6.4–8.2)
PROT UR STRIP-MCNC: NEGATIVE MG/DL
RBC # BLD AUTO: 3.94 M/UL (ref 4.2–5.3)
SODIUM SERPL-SCNC: 134 MMOL/L (ref 136–145)
SP GR UR REFRACTOMETRY: <1.005 (ref 1–1.03)
TROPONIN I SERPL-MCNC: <0.02 NG/ML (ref 0–0.04)
UROBILINOGEN UR QL STRIP.AUTO: 0.2 EU/DL (ref 0.2–1)
WBC # BLD AUTO: 19.5 K/UL (ref 4.6–13.2)

## 2019-12-20 PROCEDURE — 93005 ELECTROCARDIOGRAM TRACING: CPT

## 2019-12-20 PROCEDURE — 80053 COMPREHEN METABOLIC PANEL: CPT

## 2019-12-20 PROCEDURE — 74011250637 HC RX REV CODE- 250/637: Performed by: EMERGENCY MEDICINE

## 2019-12-20 PROCEDURE — 99284 EMERGENCY DEPT VISIT MOD MDM: CPT

## 2019-12-20 PROCEDURE — 71275 CT ANGIOGRAPHY CHEST: CPT

## 2019-12-20 PROCEDURE — 81003 URINALYSIS AUTO W/O SCOPE: CPT

## 2019-12-20 PROCEDURE — 85379 FIBRIN DEGRADATION QUANT: CPT

## 2019-12-20 PROCEDURE — 85025 COMPLETE CBC W/AUTO DIFF WBC: CPT

## 2019-12-20 PROCEDURE — 83605 ASSAY OF LACTIC ACID: CPT

## 2019-12-20 PROCEDURE — 83690 ASSAY OF LIPASE: CPT

## 2019-12-20 PROCEDURE — 74011636320 HC RX REV CODE- 636/320: Performed by: EMERGENCY MEDICINE

## 2019-12-20 PROCEDURE — 71046 X-RAY EXAM CHEST 2 VIEWS: CPT

## 2019-12-20 PROCEDURE — 84484 ASSAY OF TROPONIN QUANT: CPT

## 2019-12-20 PROCEDURE — 96374 THER/PROPH/DIAG INJ IV PUSH: CPT

## 2019-12-20 PROCEDURE — 74011250636 HC RX REV CODE- 250/636: Performed by: EMERGENCY MEDICINE

## 2019-12-20 PROCEDURE — 74011000250 HC RX REV CODE- 250: Performed by: EMERGENCY MEDICINE

## 2019-12-20 PROCEDURE — 87040 BLOOD CULTURE FOR BACTERIA: CPT

## 2019-12-20 PROCEDURE — 94640 AIRWAY INHALATION TREATMENT: CPT

## 2019-12-20 RX ORDER — LEVOFLOXACIN 750 MG/1
750 TABLET ORAL
Status: COMPLETED | OUTPATIENT
Start: 2019-12-20 | End: 2019-12-20

## 2019-12-20 RX ORDER — SODIUM CHLORIDE 0.9 % (FLUSH) 0.9 %
5-10 SYRINGE (ML) INJECTION AS NEEDED
Status: DISCONTINUED | OUTPATIENT
Start: 2019-12-20 | End: 2019-12-21 | Stop reason: HOSPADM

## 2019-12-20 RX ORDER — ACETAMINOPHEN 500 MG
1000 TABLET ORAL ONCE
Status: COMPLETED | OUTPATIENT
Start: 2019-12-20 | End: 2019-12-20

## 2019-12-20 RX ORDER — LIDOCAINE 4 G/100G
2 PATCH TOPICAL
Status: DISCONTINUED | OUTPATIENT
Start: 2019-12-20 | End: 2019-12-21 | Stop reason: HOSPADM

## 2019-12-20 RX ORDER — KETOROLAC TROMETHAMINE 30 MG/ML
15 INJECTION, SOLUTION INTRAMUSCULAR; INTRAVENOUS ONCE
Status: COMPLETED | OUTPATIENT
Start: 2019-12-20 | End: 2019-12-20

## 2019-12-20 RX ORDER — LIDOCAINE 50 MG/G
PATCH TOPICAL
Qty: 30 EACH | Refills: 0 | Status: SHIPPED | OUTPATIENT
Start: 2019-12-20 | End: 2020-02-25

## 2019-12-20 RX ORDER — BENZONATATE 100 MG/1
100 CAPSULE ORAL
Qty: 30 CAP | Refills: 0 | Status: SHIPPED | OUTPATIENT
Start: 2019-12-20 | End: 2019-12-27

## 2019-12-20 RX ORDER — LEVOFLOXACIN 750 MG/1
750 TABLET ORAL DAILY
Qty: 7 TAB | Refills: 0 | Status: SHIPPED | OUTPATIENT
Start: 2019-12-20 | End: 2019-12-27

## 2019-12-20 RX ORDER — IPRATROPIUM BROMIDE AND ALBUTEROL SULFATE 2.5; .5 MG/3ML; MG/3ML
3 SOLUTION RESPIRATORY (INHALATION)
Status: COMPLETED | OUTPATIENT
Start: 2019-12-20 | End: 2019-12-20

## 2019-12-20 RX ADMIN — LEVOFLOXACIN 750 MG: 750 TABLET, FILM COATED ORAL at 22:17

## 2019-12-20 RX ADMIN — IOPAMIDOL 100 ML: 755 INJECTION, SOLUTION INTRAVENOUS at 19:53

## 2019-12-20 RX ADMIN — KETOROLAC TROMETHAMINE 15 MG: 30 INJECTION, SOLUTION INTRAMUSCULAR at 18:59

## 2019-12-20 RX ADMIN — ACETAMINOPHEN 1000 MG: 500 TABLET, FILM COATED ORAL at 18:58

## 2019-12-20 RX ADMIN — IPRATROPIUM BROMIDE AND ALBUTEROL SULFATE 3 ML: .5; 3 SOLUTION RESPIRATORY (INHALATION) at 19:00

## 2019-12-20 RX ADMIN — SODIUM CHLORIDE, SODIUM LACTATE, POTASSIUM CHLORIDE, AND CALCIUM CHLORIDE 1000 ML: 600; 310; 30; 20 INJECTION, SOLUTION INTRAVENOUS at 18:58

## 2019-12-20 NOTE — ED PROVIDER NOTES
EMERGENCY DEPARTMENT HISTORY AND PHYSICAL EXAM    6:11 PM      Date: 12/20/2019  Patient Name: Choco Winn    History of Presenting Illness     Chief Complaint   Patient presents with    Chest Pain    Sore Throat    Abdominal Pain    Headache         History Provided By: Patient      Additional History (Context): Choco Winn is a 61 y.o. female with hypertension and Chronic pain who presents with chest pain, sore throat, abdominal pain. This is all primarily been going on since last night, he notes that she has had a dry cough not producing any mucus or hemoptysis, when she coughs it makes her head hurt especially in the front, it also makes her throat sore, and when she coughs it feels like her abdomen is pulling and it is a sharp pain. Denies any urinary symptoms, she also feels short of breath with this. She has been using her Ventolin inhaler she was given by this department in November for an acute bronchitis about every 3 hours because she feels that she needs it. Does not smoke, does not drink    PCP: Jackie Watts MD    Current Facility-Administered Medications   Medication Dose Route Frequency Provider Last Rate Last Dose    sodium chloride (NS) flush 5-10 mL  5-10 mL IntraVENous PRN Bart Sampson MD        lidocaine 4 % patch 2 Patch  2 Patch TransDERmal NOW Bart Sampson MD   2 Patch at 12/20/19 9558     Current Outpatient Medications   Medication Sig Dispense Refill    levoFLOXacin (LEVAQUIN) 750 mg tablet Take 1 Tab by mouth daily for 7 days. 7 Tab 0    benzonatate (TESSALON) 100 mg capsule Take 1 Cap by mouth three (3) times daily as needed for Cough for up to 7 days. 30 Cap 0    lidocaine (LIDODERM) 5 % Apply patch to the affected area for 12 hours a day and remove for 12 hours a day. 30 Each 0    albuterol (PROVENTIL HFA, VENTOLIN HFA, PROAIR HFA) 90 mcg/actuation inhaler Take 2 Puffs by inhalation every four (4) hours as needed for Wheezing.  1 Inhaler 0    albuterol (PROVENTIL HFA, VENTOLIN HFA, PROAIR HFA) 90 mcg/actuation inhaler Take 1 Puff by inhalation every four (4) hours as needed for Wheezing. 1 Inhaler 0    ibuprofen (MOTRIN) 400 mg tablet Take 1 Tab by mouth every six (6) hours as needed for Pain. 20 Tab 0    Morphine (DORIS) 60 mg ER capsule Take 90 mg by mouth daily.  amLODIPine-benazepril (LOTREL) 5-10 mg per capsule Take 1 Cap by mouth daily.  folic acid (FOLVITE) 1 mg tablet Take  by mouth daily.  gabapentin (NEURONTIN) 300 mg capsule Take 300 mg by mouth three (3) times daily.  adalimumab (HUMIRA) 40 mg/0.8 mL injection by SubCUTAneous route once. Past History     Past Medical History:  Past Medical History:   Diagnosis Date    Hypertension     Other ill-defined conditions(589.89)     chronic pain    Trauma        Past Surgical History:  Past Surgical History:   Procedure Laterality Date    HX ORTHOPAEDIC      repair fracture back       Family History:  History reviewed. No pertinent family history. Social History:  Social History     Tobacco Use    Smoking status: Never Smoker    Smokeless tobacco: Never Used   Substance Use Topics    Alcohol use: No    Drug use: No       Allergies: Allergies   Allergen Reactions    Penicillins Anaphylaxis         Review of Systems       Review of Systems   Constitutional: Negative. Negative for activity change, chills and fever. HENT: Positive for sore throat. Negative for ear pain and hearing loss. Eyes: Negative. Negative for pain and visual disturbance. Respiratory: Negative. Negative for cough, chest tightness and shortness of breath. Cardiovascular: Positive for chest pain. Negative for leg swelling. Gastrointestinal: Positive for abdominal pain. Negative for abdominal distention. Genitourinary: Negative. Negative for dysuria and hematuria. Musculoskeletal: Negative. Skin: Negative. Negative for rash. Neurological: Positive for headaches.  Negative for dizziness. Psychiatric/Behavioral: Negative. Negative for agitation and behavioral problems. Physical Exam     Visit Vitals  /55   Pulse 87   Temp 98.8 °F (37.1 °C)   Resp 10   Ht 5' 2\" (1.575 m)   Wt 68 kg (150 lb)   SpO2 96%   BMI 27.44 kg/m²         Physical Exam  Constitutional:       Appearance: She is well-developed. HENT:      Head: Normocephalic and atraumatic. Mouth/Throat:      Comments: Mildly dry mucous membranes, erythematous throat with no pharyngeal exudates, no significant lymphadenopathy  Eyes:      General:         Right eye: No discharge. Left eye: No discharge. Pupils: Pupils are equal, round, and reactive to light. Neck:      Musculoskeletal: Normal range of motion and neck supple. Vascular: No JVD. Trachea: No tracheal deviation. Cardiovascular:      Rate and Rhythm: Normal rate and regular rhythm. Heart sounds: Normal heart sounds. No murmur. Pulmonary:      Effort: Pulmonary effort is normal. No respiratory distress. Breath sounds: Normal breath sounds. No wheezing or rales. Abdominal:      General: Bowel sounds are normal. There is no distension. Palpations: Abdomen is soft. Tenderness: There is no tenderness. There is no rebound. Musculoskeletal: Normal range of motion. General: No tenderness or deformity. Skin:     General: Skin is warm and dry. Findings: No erythema or rash. Neurological:      Mental Status: She is alert and oriented to person, place, and time. Cranial Nerves: No cranial nerve deficit.       Comments: 5/5 strength UE/LE, 5/5 sensation UE/LE     Psychiatric:         Behavior: Behavior normal.           Diagnostic Study Results     Labs -  Recent Results (from the past 12 hour(s))   EKG, 12 LEAD, INITIAL    Collection Time: 12/20/19  5:38 PM   Result Value Ref Range    Ventricular Rate 87 BPM    Atrial Rate 87 BPM    P-R Interval 160 ms    QRS Duration 78 ms    Q-T Interval 354 ms    QTC Calculation (Bezet) 425 ms    Calculated P Axis 52 degrees    Calculated R Axis -2 degrees    Calculated T Axis 15 degrees    Diagnosis       Normal sinus rhythm  Normal ECG  When compared with ECG of 26-APR-2019 19:58,  No significant change was found     CBC WITH AUTOMATED DIFF    Collection Time: 12/20/19  6:35 PM   Result Value Ref Range    WBC 19.5 (H) 4.6 - 13.2 K/uL    RBC 3.94 (L) 4.20 - 5.30 M/uL    HGB 11.3 (L) 12.0 - 16.0 g/dL    HCT 35.2 35.0 - 45.0 %    MCV 89.3 74.0 - 97.0 FL    MCH 28.7 24.0 - 34.0 PG    MCHC 32.1 31.0 - 37.0 g/dL    RDW 13.3 11.6 - 14.5 %    PLATELET 664 211 - 472 K/uL    MPV 8.7 (L) 9.2 - 11.8 FL    NEUTROPHILS 78 (H) 40 - 73 %    LYMPHOCYTES 16 (L) 21 - 52 %    MONOCYTES 6 3 - 10 %    EOSINOPHILS 0 0 - 5 %    BASOPHILS 0 0 - 2 %    ABS. NEUTROPHILS 15.2 (H) 1.8 - 8.0 K/UL    ABS. LYMPHOCYTES 3.1 0.9 - 3.6 K/UL    ABS. MONOCYTES 1.1 0.05 - 1.2 K/UL    ABS. EOSINOPHILS 0.1 0.0 - 0.4 K/UL    ABS. BASOPHILS 0.0 0.0 - 0.1 K/UL    DF AUTOMATED     METABOLIC PANEL, COMPREHENSIVE    Collection Time: 12/20/19  6:35 PM   Result Value Ref Range    Sodium 134 (L) 136 - 145 mmol/L    Potassium 4.3 3.5 - 5.5 mmol/L    Chloride 102 100 - 111 mmol/L    CO2 27 21 - 32 mmol/L    Anion gap 5 3.0 - 18 mmol/L    Glucose 130 (H) 74 - 99 mg/dL    BUN 16 7.0 - 18 MG/DL    Creatinine 1.08 0.6 - 1.3 MG/DL    BUN/Creatinine ratio 15 12 - 20      GFR est AA >60 >60 ml/min/1.73m2    GFR est non-AA 52 (L) >60 ml/min/1.73m2    Calcium 8.5 8.5 - 10.1 MG/DL    Bilirubin, total 0.8 0.2 - 1.0 MG/DL    ALT (SGPT) 66 (H) 13 - 56 U/L    AST (SGOT) 55 (H) 10 - 38 U/L    Alk.  phosphatase 118 (H) 45 - 117 U/L    Protein, total 7.1 6.4 - 8.2 g/dL    Albumin 3.4 3.4 - 5.0 g/dL    Globulin 3.7 2.0 - 4.0 g/dL    A-G Ratio 0.9 0.8 - 1.7     TROPONIN I    Collection Time: 12/20/19  6:35 PM   Result Value Ref Range    Troponin-I, QT <0.02 0.0 - 0.045 NG/ML   LIPASE    Collection Time: 12/20/19  6:35 PM   Result Value Ref Range    Lipase 82 73 - 393 U/L   D DIMER    Collection Time: 12/20/19  6:35 PM   Result Value Ref Range    D DIMER 0.90 (H) <0.46 ug/ml(FEU)   URINALYSIS W/ RFLX MICROSCOPIC    Collection Time: 12/20/19  7:35 PM   Result Value Ref Range    Color YELLOW      Appearance CLEAR      Specific gravity <1.005 (L) 1.005 - 1.030    pH (UA) 6.0 5.0 - 8.0      Protein NEGATIVE  NEG mg/dL    Glucose NEGATIVE  NEG mg/dL    Ketone NEGATIVE  NEG mg/dL    Bilirubin NEGATIVE  NEG      Blood NEGATIVE  NEG      Urobilinogen 0.2 0.2 - 1.0 EU/dL    Nitrites NEGATIVE  NEG      Leukocyte Esterase NEGATIVE  NEG     POC LACTIC ACID    Collection Time: 12/20/19  7:44 PM   Result Value Ref Range    Lactic Acid (POC) 1.18 0.40 - 2.00 mmol/L       Radiologic Studies -   CTA CHEST W OR W WO CONT   Final Result   IMPRESSION:      No evidence of pulmonary embolism. Multifocal airspace disease is present involving all lobes of the right lung,   compatible with pneumonia in the appropriate clinical setting. An enlarged right   hilar lymph node is presumably reactive. Dependent fluid is noted within the majority of the esophagus; aspiration   precautions are advised. XR CHEST PA LAT   Final Result   Impression:      No significant abnormality. Medical Decision Making   I am the first provider for this patient. I reviewed the vital signs, available nursing notes, past medical history, past surgical history, family history and social history. Vital Signs-Reviewed the patient's vital signs. EKG: Interpreted by the EP.    Time Interpreted: 1738   Rate: 87   Rhythm: Normal Sinus Rhythm    Interpretation: normal intervals, normal axis, no ischemia   Comparison: no change    Records Reviewed: Nursing Notes and Old Medical Records      Provider Notes (Medical Decision Making):     MDM  Number of Diagnoses or Management Options  Multifocal pneumonia:   Diagnosis management comments: Differential Diagnosis: Costochondritis, viral syndrome, pneumonia, PE    Patient coming in with multiple complaints, mostly all related to her cough, sounds more like costochondritis when she coughs, however given that she thinks her left lower extremity is swollen we will order a d-dimer, she does have a history of travel but not recently, no history of PE that I can ascertain. She has borderline tachypneic, will get chest x-ray to rule out pneumonia, troponin x1, her throat is erythematous but no exudates, low Centor criteria, very unlikely strep, will not test, she does not have body aches, vomiting, diarrhea, less likely flu a or B and she is afebrile here and has minimal risk factors so no indication for testing or treatment. We will focus on symptomatic treatment, rule out emergent pathology    Reevaluation: Patient does have leukocytosis, elevated d-dimer, CTA obtained which shows no PE but multifocal pneumonia. 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 I think this likely represents a pneumonia, her PSI score is 58 which recommends outpatient therapy, she is not hypoxic or tachypneic, afebrile, looks well, I have carefully discussed return precautions at that she should follow-up very shortly with her PCP to make sure she is feeling better. Due to her penicillin allergy I will treat with levofloxacin    Safe for d/c, careful return precautions given. Pt/family comfortable with plan    Betty Longoria MD          Diagnosis     Clinical Impression:   1.  Multifocal pneumonia        Disposition: home    Follow-up Information     Follow up With Specialties Details Why [facility] EMERGENCY DEPT Emergency Medicine  As needed, If symptoms worsen [address] [facility] 51    Dany Rosenberg MD Physical Medicine and Rehab In 2 days  [address]  [address] [facility]  [phone]             Discharge Medication List as of 12/20/2019  9:31 PM      START taking these medications    Details   levoFLOXacin (LEVAQUIN) 750 mg tablet Take 1 Tab by mouth daily for 7 days. , Print, Disp-7 Tab, R-0      benzonatate (TESSALON) 100 mg capsule Take 1 Cap by mouth three (3) times daily as needed for Cough for up to 7 days. , Print, Disp-30 Cap, R-0         CONTINUE these medications which have NOT CHANGED    Details   !! albuterol (PROVENTIL HFA, VENTOLIN HFA, PROAIR HFA) 90 mcg/actuation inhaler Take 2 Puffs by inhalation every four (4) hours as needed for Wheezing., Print, Disp-1 Inhaler, R-0      !! albuterol (PROVENTIL HFA, VENTOLIN HFA, PROAIR HFA) 90 mcg/actuation inhaler Take 1 Puff by inhalation every four (4) hours as needed for Wheezing., Print, Disp-1 Inhaler, R-0      ibuprofen (MOTRIN) 400 mg tablet Take 1 Tab by mouth every six (6) hours as needed for Pain., Print, Disp-20 Tab, R-0      Morphine (DORIS) 60 mg ER capsule Take 90 mg by mouth daily. , Historical Med      amLODIPine-benazepril (LOTREL) 5-10 mg per capsule Take 1 Cap by mouth daily. , Historical Med      folic acid (FOLVITE) 1 mg tablet Take  by mouth daily. , Historical Med      gabapentin (NEURONTIN) 300 mg capsule Take 300 mg by mouth three (3) times daily. , Historical Med      adalimumab (HUMIRA) 40 mg/0.8 mL injection by SubCUTAneous route once., Historical Med       !! - Potential duplicate medications found. Please discuss with provider.        _______________________________    Please note that this dictation was completed with Nourish, the [employer] voice recognition software. Quite often unanticipated grammatical, syntax, homophones, and other interpretive errors are inadvertently transcribed by the computer software. Please disregard these errors. Please excuse any errors that have escaped final proofreading.

## 2019-12-20 NOTE — ED TRIAGE NOTES
Pt arrives with c/o stomach, chest, dysuria, sore throat and headache. Pt also states she has bronchitis. Taking inhalers and antibiotics per daughter.

## 2019-12-20 NOTE — ED NOTES
I performed a brief history of the patient here in triage and I have determined that pt will need further treatment and evaluation from the main side ER physician. I have placed initial orders based on the history to help in expediting patients care. Patient presents with complaints of stomach pain, chest pain, cough, SOB, sore throat and headache since yesterday. Patient reports chest pain at rest. States that she was recently diagnosed with bronchitis in November.     Visit Vitals  /74 (BP 1 Location: Right arm, BP Patient Position: Sitting)   Pulse 95   Temp 98.8 °F (37.1 °C)   Resp 22   Ht 5' 2\" (1.575 m)   Wt 68 kg (150 lb)   SpO2 98%   BMI 27.44 kg/m²       Mary Warren PA-C  5:49 PM

## 2019-12-21 LAB
ATRIAL RATE: 87 BPM
CALCULATED P AXIS, ECG09: 52 DEGREES
CALCULATED R AXIS, ECG10: -2 DEGREES
CALCULATED T AXIS, ECG11: 15 DEGREES
DIAGNOSIS, 93000: NORMAL
P-R INTERVAL, ECG05: 160 MS
Q-T INTERVAL, ECG07: 354 MS
QRS DURATION, ECG06: 78 MS
QTC CALCULATION (BEZET), ECG08: 425 MS
VENTRICULAR RATE, ECG03: 87 BPM

## 2019-12-21 NOTE — DISCHARGE INSTRUCTIONS
Patient Education        Neumonía: Instrucciones de cuidado - [ Pneumonia: Care Instructions ]  Instrucciones de cuidado    La neumonía es jennyfer infección de los pulmones. Juan Pitcher de los casos son causados por infecciones debidas a bacterias o virus. La neumonía puede ser leve o muy grave. Si es causada por bacterias, será tratado con antibióticos. La recuperación completa de la neumonía podría carmelina [employer] o algunos meses, dependiendo de qué tan enfermo estuvo y si chun estado general de apollo es aguilar. La atención de seguimiento es jennyfer parte clave de chun tratamiento y seguridad. Asegúrese de hacer y acudir a todas las citas, y llame a chun médico si está teniendo problemas. También es jennyfer buena idea saber los resultados de dania exámenes y mantener jennyfer lista de los medicamentos que amie. ¿Cómo puede cuidarse en el hogar? · [address] indicaciones. No deje de tomarlos por el hecho de sentirse mejor. Debe carmelina todos los antibióticos hasta terminarlos. · [employer] medicamentos exactamente emma le fueron recetados. Llame a chun médico si buddy estar teniendo problemas con chun medicamento. · Descanse y duerma lo suficiente. Podría sentirse cansado y débil ivette un [address], kandace chun nivel de energía mejorará con [place]. · Para prevenir la deshidratación, aureliano abundantes líquidos, los suficientes para que chun orina sea de color amarillo antonio o bertha emma el agua. Elija agua y otros líquidos stanley sin cafeína hasta que se sienta mejor. Si tiene jennyfer enfermedad del riñón, del corazón o del hígado y tiene que Jay's líquidos, hable con chun médico antes de aumentar chun consumo. · Trate la tos para que pueda descansar. La tos con mucosidad (flema) de los pulmones es común con la neumonía. Es jennyfer de las [employer] que chun organismo [address].  Kandace si la tos le impide descansar o le causa gran fatiga y dolor en la pared torácica, hable con chun Cm Weiner que tome un medicamento para aliviar la tos. · Utilice un vaporizador o humidificador para añadir humedad en chun habitación. Siga las indicaciones para limpiar el aparato. · No fume ni permita que otras personas fumen cerca de usted. Fumar hará que la tos dure [place]. Si necesita ayuda para dejar de fumar, hable con chun médico [employer] y medicamentos para dejar de fumar. Éstos pueden aumentar dania probabilidades de dejar el hábito para siempre. · [place] un analgésico (medicamento para el dolor) de venta demetri, emma acetaminofén (Tylenol), ibuprofeno (Advil, Motrin) o naproxeno (Aleve). Sara y siga todas las instrucciones de la [place]. · No tome dos o más analgésicos al mismo tiempo a menos que el médico se lo haya indicado. Muchos analgésicos contienen acetaminofén, es decir, Tylenol. El exceso de acetaminofén (Tylenol) puede ser dañino. · Si le dieron un espirómetro para medir qué tan ray están funcionando dania pulmones, utilícelo emma se lo indicaron. Marvel puede ayudar a chun médico a saber cómo va chun recuperación. · Para prevenir la neumonía en el futuro, hable con chun médico acerca de vacunarse contra la gripe (jennyfer vez al año) y [place] antineumocócica (sólo jennyfer vez para la [address]). ¿Cuándo debe pedir ayuda? Llame al 911 en cualquier momento que considere que necesita atención de emergencia.  Por ejemplo, llame si:    · Tiene serias dificultades para respirar.    Llame a chun médico ahora mismo o busque atención médica inmediata si:    · Tose mucosidad (esputo) de color café oscuro o con endy.     · Tiene nueva o peor dificultad para respirar.     · Siente mareos o aturdimiento, o que está a punto de desmayarse.    Preste especial atención a los cambios en chun apollo y asegúrese de comunicarse con chun médico si:    · Presenta fiebre o la fiebre es más glenny.     · Chun tos es más profunda o más frecuente que antes.     · No está mejorando después de 2 días (48 horas).     · No mejora emma se esperaba. ¿Dónde puede encontrar más información en inglés? Gonsalo Read a http://hayley-derrick.info/. David Ring [address] en la búsqueda para aprender más acerca de \"Neumonía: Instrucciones de cuidado - [ Pneumonia: Care Instructions ]. \"  Revisado: 9 junio, 2019  Versión del contenido: 12.2  © 7695-4002 Healthwise, Incorporated. Las instrucciones de cuidado fueron adaptadas bajo licencia por [employer] (which disclaims liability or warranty for this information). Si usted tiene [place] afección médica o sobre estas instrucciones, siempre pregunte a chun profesional de apollo. [employer], [employer] niega toda garantía o responsabilidad por chun uso de esta información.

## 2019-12-26 LAB
BACTERIA SPEC CULT: NORMAL
SERVICE CMNT-IMP: NORMAL

## 2020-02-25 ENCOUNTER — APPOINTMENT (OUTPATIENT)
Dept: GENERAL RADIOLOGY | Age: 60
DRG: 194 | End: 2020-02-25
Attending: EMERGENCY MEDICINE
Payer: SELF-PAY

## 2020-02-25 ENCOUNTER — HOSPITAL ENCOUNTER (INPATIENT)
Age: 60
LOS: 1 days | Discharge: HOME | DRG: 194 | End: 2020-02-27
Attending: EMERGENCY MEDICINE | Admitting: HOSPITALIST
Payer: SELF-PAY

## 2020-02-25 DIAGNOSIS — J18.9 COMMUNITY ACQUIRED PNEUMONIA OF RIGHT LOWER LOBE OF LUNG: Primary | ICD-10-CM

## 2020-02-25 PROBLEM — J44.9 COPD (CHRONIC OBSTRUCTIVE PULMONARY DISEASE) (HCC): Status: ACTIVE | Noted: 2020-02-25

## 2020-02-25 PROBLEM — I10 HTN (HYPERTENSION): Status: ACTIVE | Noted: 2020-02-25

## 2020-02-25 LAB
ALBUMIN SERPL-MCNC: 3.5 G/DL (ref 3.4–5)
ALBUMIN/GLOB SERPL: 0.8 {RATIO} (ref 0.8–1.7)
ALP SERPL-CCNC: 121 U/L (ref 45–117)
ALT SERPL-CCNC: 72 U/L (ref 13–56)
ANION GAP SERPL CALC-SCNC: 7 MMOL/L (ref 3–18)
APPEARANCE UR: CLEAR
AST SERPL-CCNC: 89 U/L (ref 10–38)
BASOPHILS # BLD: 0 K/UL (ref 0–0.1)
BASOPHILS NFR BLD: 0 % (ref 0–2)
BILIRUB SERPL-MCNC: 0.8 MG/DL (ref 0.2–1)
BILIRUB UR QL: NEGATIVE
BUN SERPL-MCNC: 15 MG/DL (ref 7–18)
BUN/CREAT SERPL: 19 (ref 12–20)
CALCIUM SERPL-MCNC: 8.9 MG/DL (ref 8.5–10.1)
CHLORIDE SERPL-SCNC: 106 MMOL/L (ref 100–111)
CO2 SERPL-SCNC: 24 MMOL/L (ref 21–32)
COLOR UR: YELLOW
CREAT SERPL-MCNC: 0.78 MG/DL (ref 0.6–1.3)
DIFFERENTIAL METHOD BLD: ABNORMAL
EOSINOPHIL # BLD: 0 K/UL (ref 0–0.4)
EOSINOPHIL NFR BLD: 0 % (ref 0–5)
ERYTHROCYTE [DISTWIDTH] IN BLOOD BY AUTOMATED COUNT: 13 % (ref 11.6–14.5)
FLUAV AG NPH QL IA: NEGATIVE
FLUBV AG NOSE QL IA: NEGATIVE
GLOBULIN SER CALC-MCNC: 4.3 G/DL (ref 2–4)
GLUCOSE SERPL-MCNC: 102 MG/DL (ref 74–99)
GLUCOSE UR STRIP.AUTO-MCNC: NEGATIVE MG/DL
HCT VFR BLD AUTO: 39.1 % (ref 35–45)
HGB BLD-MCNC: 12.8 G/DL (ref 12–16)
HGB UR QL STRIP: NEGATIVE
KETONES UR QL STRIP.AUTO: NEGATIVE MG/DL
LACTATE SERPL-SCNC: 2.2 MMOL/L (ref 0.4–2)
LACTATE SERPL-SCNC: 2.2 MMOL/L (ref 0.4–2)
LACTATE SERPL-SCNC: 2.3 MMOL/L (ref 0.4–2)
LEUKOCYTE ESTERASE UR QL STRIP.AUTO: NEGATIVE
LYMPHOCYTES # BLD: 0.9 K/UL (ref 0.9–3.6)
LYMPHOCYTES NFR BLD: 11 % (ref 21–52)
MCH RBC QN AUTO: 29.2 PG (ref 24–34)
MCHC RBC AUTO-ENTMCNC: 32.7 G/DL (ref 31–37)
MCV RBC AUTO: 89.3 FL (ref 74–97)
MONOCYTES # BLD: 0.2 K/UL (ref 0.05–1.2)
MONOCYTES NFR BLD: 2 % (ref 3–10)
NEUTS SEG # BLD: 6.9 K/UL (ref 1.8–8)
NEUTS SEG NFR BLD: 87 % (ref 40–73)
NITRITE UR QL STRIP.AUTO: NEGATIVE
PH UR STRIP: 7 [PH] (ref 5–8)
PLATELET # BLD AUTO: 288 K/UL (ref 135–420)
PMV BLD AUTO: 9.1 FL (ref 9.2–11.8)
POTASSIUM SERPL-SCNC: 3.9 MMOL/L (ref 3.5–5.5)
PROT SERPL-MCNC: 7.8 G/DL (ref 6.4–8.2)
PROT UR STRIP-MCNC: NEGATIVE MG/DL
RBC # BLD AUTO: 4.38 M/UL (ref 4.2–5.3)
SODIUM SERPL-SCNC: 137 MMOL/L (ref 136–145)
SP GR UR REFRACTOMETRY: 1.01 (ref 1–1.03)
UROBILINOGEN UR QL STRIP.AUTO: 0.2 EU/DL (ref 0.2–1)
WBC # BLD AUTO: 7.9 K/UL (ref 4.6–13.2)

## 2020-02-25 PROCEDURE — 93005 ELECTROCARDIOGRAM TRACING: CPT

## 2020-02-25 PROCEDURE — 99218 HC RM OBSERVATION: CPT

## 2020-02-25 PROCEDURE — 96372 THER/PROPH/DIAG INJ SC/IM: CPT

## 2020-02-25 PROCEDURE — 71045 X-RAY EXAM CHEST 1 VIEW: CPT

## 2020-02-25 PROCEDURE — 74011250636 HC RX REV CODE- 250/636: Performed by: HOSPITALIST

## 2020-02-25 PROCEDURE — 87804 INFLUENZA ASSAY W/OPTIC: CPT

## 2020-02-25 PROCEDURE — 74011250637 HC RX REV CODE- 250/637: Performed by: EMERGENCY MEDICINE

## 2020-02-25 PROCEDURE — 74011250636 HC RX REV CODE- 250/636: Performed by: PHYSICIAN ASSISTANT

## 2020-02-25 PROCEDURE — 80053 COMPREHEN METABOLIC PANEL: CPT

## 2020-02-25 PROCEDURE — 87040 BLOOD CULTURE FOR BACTERIA: CPT

## 2020-02-25 PROCEDURE — 94762 N-INVAS EAR/PLS OXIMTRY CONT: CPT

## 2020-02-25 PROCEDURE — 96366 THER/PROPH/DIAG IV INF ADDON: CPT

## 2020-02-25 PROCEDURE — 85025 COMPLETE CBC W/AUTO DIFF WBC: CPT

## 2020-02-25 PROCEDURE — 74011250637 HC RX REV CODE- 250/637: Performed by: PHYSICIAN ASSISTANT

## 2020-02-25 PROCEDURE — 87449 NOS EACH ORGANISM AG IA: CPT

## 2020-02-25 PROCEDURE — 83605 ASSAY OF LACTIC ACID: CPT

## 2020-02-25 PROCEDURE — 74011250637 HC RX REV CODE- 250/637: Performed by: HOSPITALIST

## 2020-02-25 PROCEDURE — 74011250636 HC RX REV CODE- 250/636: Performed by: EMERGENCY MEDICINE

## 2020-02-25 PROCEDURE — 74011000250 HC RX REV CODE- 250: Performed by: EMERGENCY MEDICINE

## 2020-02-25 PROCEDURE — 96365 THER/PROPH/DIAG IV INF INIT: CPT

## 2020-02-25 PROCEDURE — 87450 LEGIONELLA PNEUMOPHILA AG, URINE: CPT

## 2020-02-25 PROCEDURE — 81003 URINALYSIS AUTO W/O SCOPE: CPT

## 2020-02-25 PROCEDURE — 99285 EMERGENCY DEPT VISIT HI MDM: CPT

## 2020-02-25 RX ORDER — IBUPROFEN 600 MG/1
600 TABLET ORAL
Status: COMPLETED | OUTPATIENT
Start: 2020-02-25 | End: 2020-02-25

## 2020-02-25 RX ORDER — SODIUM CHLORIDE 0.9 % (FLUSH) 0.9 %
5-40 SYRINGE (ML) INJECTION AS NEEDED
Status: DISCONTINUED | OUTPATIENT
Start: 2020-02-25 | End: 2020-02-27 | Stop reason: HOSPADM

## 2020-02-25 RX ORDER — ACETAMINOPHEN 325 MG/1
650 TABLET ORAL
Status: DISCONTINUED | OUTPATIENT
Start: 2020-02-25 | End: 2020-02-27 | Stop reason: HOSPADM

## 2020-02-25 RX ORDER — GUAIFENESIN 600 MG/1
600 TABLET, EXTENDED RELEASE ORAL EVERY 12 HOURS
Status: DISCONTINUED | OUTPATIENT
Start: 2020-02-25 | End: 2020-02-27 | Stop reason: HOSPADM

## 2020-02-25 RX ORDER — LEVOFLOXACIN 5 MG/ML
750 INJECTION, SOLUTION INTRAVENOUS EVERY 24 HOURS
Status: DISCONTINUED | OUTPATIENT
Start: 2020-02-25 | End: 2020-02-27 | Stop reason: HOSPADM

## 2020-02-25 RX ORDER — ENOXAPARIN SODIUM 100 MG/ML
40 INJECTION SUBCUTANEOUS EVERY 24 HOURS
Status: DISCONTINUED | OUTPATIENT
Start: 2020-02-25 | End: 2020-02-27 | Stop reason: HOSPADM

## 2020-02-25 RX ORDER — SODIUM CHLORIDE 9 MG/ML
200 INJECTION, SOLUTION INTRAVENOUS ONCE
Status: COMPLETED | OUTPATIENT
Start: 2020-02-25 | End: 2020-02-25

## 2020-02-25 RX ORDER — ACETAMINOPHEN 500 MG
1000 TABLET ORAL
Status: COMPLETED | OUTPATIENT
Start: 2020-02-25 | End: 2020-02-25

## 2020-02-25 RX ORDER — IPRATROPIUM BROMIDE AND ALBUTEROL SULFATE 2.5; .5 MG/3ML; MG/3ML
3 SOLUTION RESPIRATORY (INHALATION) ONCE
Status: COMPLETED | OUTPATIENT
Start: 2020-02-25 | End: 2020-02-25

## 2020-02-25 RX ORDER — MORPHINE SULFATE 15 MG/1
60 TABLET, FILM COATED, EXTENDED RELEASE ORAL EVERY 12 HOURS
Status: DISCONTINUED | OUTPATIENT
Start: 2020-02-25 | End: 2020-02-26

## 2020-02-25 RX ORDER — MORPHINE SULFATE 15 MG/1
60 TABLET, FILM COATED, EXTENDED RELEASE ORAL
Status: COMPLETED | OUTPATIENT
Start: 2020-02-25 | End: 2020-02-25

## 2020-02-25 RX ORDER — SODIUM CHLORIDE 0.9 % (FLUSH) 0.9 %
5-10 SYRINGE (ML) INJECTION AS NEEDED
Status: DISCONTINUED | OUTPATIENT
Start: 2020-02-25 | End: 2020-02-27 | Stop reason: HOSPADM

## 2020-02-25 RX ORDER — GABAPENTIN 300 MG/1
300 CAPSULE ORAL 3 TIMES DAILY
Status: DISCONTINUED | OUTPATIENT
Start: 2020-02-25 | End: 2020-02-26

## 2020-02-25 RX ORDER — SODIUM CHLORIDE 9 MG/ML
100 INJECTION, SOLUTION INTRAVENOUS CONTINUOUS
Status: DISPENSED | OUTPATIENT
Start: 2020-02-25 | End: 2020-02-26

## 2020-02-25 RX ORDER — MORPHINE SULFATE 15 MG/1
60 TABLET, FILM COATED, EXTENDED RELEASE ORAL 2 TIMES DAILY
Status: DISCONTINUED | OUTPATIENT
Start: 2020-02-25 | End: 2020-02-25 | Stop reason: SDUPTHER

## 2020-02-25 RX ORDER — NALOXONE HYDROCHLORIDE 0.4 MG/ML
0.4 INJECTION, SOLUTION INTRAMUSCULAR; INTRAVENOUS; SUBCUTANEOUS AS NEEDED
Status: DISCONTINUED | OUTPATIENT
Start: 2020-02-25 | End: 2020-02-27 | Stop reason: HOSPADM

## 2020-02-25 RX ORDER — SODIUM CHLORIDE 0.9 % (FLUSH) 0.9 %
5-40 SYRINGE (ML) INJECTION EVERY 8 HOURS
Status: DISCONTINUED | OUTPATIENT
Start: 2020-02-25 | End: 2020-02-27 | Stop reason: HOSPADM

## 2020-02-25 RX ORDER — IPRATROPIUM BROMIDE AND ALBUTEROL SULFATE 2.5; .5 MG/3ML; MG/3ML
3 SOLUTION RESPIRATORY (INHALATION)
Status: DISCONTINUED | OUTPATIENT
Start: 2020-02-25 | End: 2020-02-27 | Stop reason: HOSPADM

## 2020-02-25 RX ADMIN — SODIUM CHLORIDE 100 ML/HR: 900 INJECTION, SOLUTION INTRAVENOUS at 18:24

## 2020-02-25 RX ADMIN — Medication 10 ML: at 18:10

## 2020-02-25 RX ADMIN — GABAPENTIN 300 MG: 300 CAPSULE ORAL at 17:02

## 2020-02-25 RX ADMIN — IBUPROFEN 600 MG: 600 TABLET, FILM COATED ORAL at 09:23

## 2020-02-25 RX ADMIN — SODIUM CHLORIDE 100 ML/HR: 900 INJECTION, SOLUTION INTRAVENOUS at 13:11

## 2020-02-25 RX ADMIN — IPRATROPIUM BROMIDE AND ALBUTEROL SULFATE 3 ML: .5; 3 SOLUTION RESPIRATORY (INHALATION) at 09:24

## 2020-02-25 RX ADMIN — Medication 10 ML: at 22:19

## 2020-02-25 RX ADMIN — SODIUM CHLORIDE 1000 ML: 900 INJECTION, SOLUTION INTRAVENOUS at 15:49

## 2020-02-25 RX ADMIN — MORPHINE SULFATE 60 MG: 15 TABLET, FILM COATED, EXTENDED RELEASE ORAL at 21:17

## 2020-02-25 RX ADMIN — IPRATROPIUM BROMIDE AND ALBUTEROL SULFATE 3 ML: .5; 3 SOLUTION RESPIRATORY (INHALATION) at 13:10

## 2020-02-25 RX ADMIN — SODIUM CHLORIDE 200 ML/HR: 900 INJECTION, SOLUTION INTRAVENOUS at 11:09

## 2020-02-25 RX ADMIN — SODIUM CHLORIDE 1000 ML: 900 INJECTION, SOLUTION INTRAVENOUS at 09:24

## 2020-02-25 RX ADMIN — ACETAMINOPHEN 1000 MG: 500 TABLET, FILM COATED ORAL at 09:23

## 2020-02-25 RX ADMIN — GUAIFENESIN 600 MG: 600 TABLET, EXTENDED RELEASE ORAL at 21:20

## 2020-02-25 RX ADMIN — MORPHINE SULFATE 60 MG: 15 TABLET, FILM COATED, EXTENDED RELEASE ORAL at 11:08

## 2020-02-25 RX ADMIN — ENOXAPARIN SODIUM 40 MG: 40 INJECTION, SOLUTION INTRAVENOUS; SUBCUTANEOUS at 13:11

## 2020-02-25 RX ADMIN — LEVOFLOXACIN 750 MG: 5 INJECTION, SOLUTION INTRAVENOUS at 10:27

## 2020-02-25 RX ADMIN — GABAPENTIN 300 MG: 300 CAPSULE ORAL at 21:18

## 2020-02-25 NOTE — ED TRIAGE NOTES
Pt cough and congestion, fever, headache, generalized body pains, pain with cough and urinary frequency for 2-3 days. Pt with hx of bronchitis. Pt's daughter present as .

## 2020-02-25 NOTE — ED NOTES
Patient given meal tray. Sitting up in bed eating. No acute signs of distress. Will continue to monitor.

## 2020-02-25 NOTE — PROGRESS NOTES
completed the initial Spiritual Assessment of the patient  In bed 4 of the emergency rooom, and offered Pastoral Care support to the patient. Patient does not have any Mormon/cultural needs that will affect patients preferences in health care. Chaplains will continue to follow and will provide pastoral care on an as needed/requested basis.     [facility] Care Department  [phone]

## 2020-02-25 NOTE — ED NOTES
Patient septic work up. Presents with cough, fever, generalized pain and urinary frequency. symptoms started about 3 days ago.

## 2020-02-25 NOTE — H&P
Internal Medicine History and Physical          Subjective     HPI: Nataly Narayanan is a 61 y.o. female with a PMHx of COPD, HTN who presented to the ED with c/o cough and pleuritic chest pain that started yesterday associated with chills and subjective fever. Daughter at bedside assisting with translation. In the ED, patient was found to be febrile (max 103) with intermittent hypotension and mild tachycardiac. WBC wnl. Flu swab negative. CXR shows RML/RLL infiltrate with small right pleural effusion. O2 sats stable on RA. UA negative. Trop negative x2. EKG with sinus tach. Patient will be admitted for further evaluation and treatment.     Daughter cell: [phone]     PMHx:  Past Medical History:   Diagnosis Date    Chronic obstructive pulmonary disease ([facility] [address]     Hypertension     Other ill-defined conditions(799.89)     chronic pain    Trauma        PSurgHx:  Past Surgical History:   Procedure Laterality Date    HX ORTHOPAEDIC      repair fracture back       SocialHx:  Social History     Socioeconomic History    Marital status: SINGLE     Spouse name: Not on file    Number of children: Not on file    Years of education: Not on file    Highest education level: Not on file   Occupational History    Not on file   Social Needs    Financial resource strain: Not on file    Food insecurity:     Worry: Not on file     Inability: Not on file    Transportation needs:     Medical: Not on file     Non-medical: Not on file   Tobacco Use    Smoking status: Never Smoker    Smokeless tobacco: Never Used   Substance and Sexual Activity    Alcohol use: No    Drug use: No    Sexual activity: Not on file   Lifestyle    Physical activity:     Days per week: Not on file     Minutes per session: Not on file    Stress: Not on file   Relationships    Social connections:     Talks on phone: Not on file     Gets together: Not on file     Attends Sabianism service: Not on file     Active member of club or organization: Not on file     Attends meetings of clubs or organizations: Not on file     Relationship status: Not on file    Intimate partner violence:     Fear of current or ex partner: Not on file     Emotionally abused: Not on file     Physically abused: Not on file     Forced sexual activity: Not on file   Other Topics Concern    Not on file   Social History Narrative    Not on file       FamilyHx:  History reviewed. No pertinent family history. Home Medications:  Prior to Admission Medications   Prescriptions Last Dose Informant Patient Reported? Taking? Morphine (DORIS) 60 mg ER capsule   Yes No   Sig: Take 90 mg by mouth daily. adalimumab (HUMIRA) 40 mg/0.8 mL injection   Yes No   Sig: by SubCUTAneous route once. albuterol (PROVENTIL HFA, VENTOLIN HFA, PROAIR HFA) 90 mcg/actuation inhaler   No No   Sig: Take 2 Puffs by inhalation every four (4) hours as needed for Wheezing. amLODIPine-benazepril (LOTREL) 5-10 mg per capsule   Yes No   Sig: Take 1 Cap by mouth daily. folic acid (FOLVITE) 1 mg tablet   Yes No   Sig: Take  by mouth daily. gabapentin (NEURONTIN) 300 mg capsule   Yes No   Sig: Take 300 mg by mouth three (3) times daily. Facility-Administered Medications: None       Allergies:   Allergies   Allergen Reactions    Penicillins Anaphylaxis        Review of Systems:  CONST: Fever, weight loss, fatigue or chills  HEENT: Recent changes in vision, vertigo, epistaxis, dysphagia and hoarseness  CV: Chest pain, palpitations, HTN, edema and varicosities  RESP: Cough, shortness of breath, wheezing, hemoptysis, snoring and reactive airway disease  GI: Nausea, vomiting, abdominal pain, change in bowel habits, hematochezia, melena, and GERD   : Hematuria, dysuria, frequency, urgency, nocturia and stress urinary incontinence   MS: Weakness, joint pain and arthritis  ENDO: Diabetes, thyroid disease, polyuria, polydipsia, polyphagia, poor wound healing, heat intolerance, cold intolerance  LYMPH/HEME: Anemia, bruising and history of blood transfusions  INTEG: Dermatitis, abnormal moles  NEURO: Dizziness, headache, fainting, seizures and stroke  PSYCH: Anxiety and depression      Objective      Visit Vitals  /55   Pulse (!) 104   Temp (!) 100.6 °F (38.1 °C)   Resp 17   Ht 5' 2\" (1.575 m)   Wt 69.4 kg (153 lb)   SpO2 90%   BMI 27.98 kg/m²       Physical Exam:  General Appearance: NAD, conversant  HENT: normocephalic/atraumatic, moist mucus membranes  Lungs: crackles, coarse breath sounds in RLL with normal respiratory effort  Cardiovascular: tachy, no m/r/g  Abdomen: soft, non-tender, normal bowel sounds  Extremities: no cyanosis, no peripheral edema  Neuro: moves all extremities, no focal deficits  Psych: appropriate affect, alert and oriented to person, place and time    Laboratory Studies:  BMP:   Lab Results   Component Value Date/Time     02/25/2020 09:14 AM    K 3.9 02/25/2020 09:14 AM     02/25/2020 09:14 AM    CO2 24 02/25/2020 09:14 AM    AGAP 7 02/25/2020 09:14 AM     (H) 02/25/2020 09:14 AM    BUN 15 02/25/2020 09:14 AM    CREA 0.78 02/25/2020 09:14 AM    GFRAA >60 02/25/2020 09:14 AM    GFRNA >60 02/25/2020 09:14 AM     CBC:   Lab Results   Component Value Date/Time    WBC 7.9 02/25/2020 09:14 AM    HGB 12.8 02/25/2020 09:14 AM    HCT 39.1 02/25/2020 09:14 AM     02/25/2020 09:14 AM       Imaging Reviewed:  Tiffani Her Chest Port    Result Date: 2/25/2020  EXAM: XR CHEST PORT CLINICAL INDICATION/HISTORY: meets SIRS criteria. Sepsis. -Additional: None COMPARISON: 12/20/2019 TECHNIQUE: Portable frontal view of the chest _______________ FINDINGS: SUPPORT DEVICES: None. HEART AND MEDIASTINUM: Normal size heart. LUNGS AND PLEURAL SPACES: Developing right mid to lower lobe infiltrate. Small right pleural effusion. No pneumothorax. BONY THORAX AND SOFT TISSUES: Unremarkable. _______________     IMPRESSION: Developing right mid to lower lobe infiltrate.  Small right pleural effusion. EKG:   sinus tachycardia. Assessment/Plan     Principal Problem:    CAP (community acquired pneumonia) (2/25/2020)    Active Problems:    COPD (chronic obstructive pulmonary disease) ([facility] [address] (2/25/2020)      HTN (hypertension) (2/25/2020)      Chronic pain (8/8/2014)      CAP  - cont levaquin, mucinex  - monitor cultures  - monitor vitals  - currently stable on RA  - cont IV fluids    COPD  - PRN nebs    HTN  - hold home medications d/t borderline BP  - cont IV fluids    Chronic pain  - cont home meds    - Cont acceptable home medications for chronic conditions   - DVT protocol    I have personally reviewed all pertinent labs, films and EKGs that have officially resulted. I reviewed available electronic documentation outlining the initial presentation as well as the emergency room physician's encounter.     Kirstin Hall PA-C  [address] [place] [address]  Hospitalist Division  Office:  [phone]  Pager: [phone]

## 2020-02-25 NOTE — PROGRESS NOTES
TRANSFER - IN REPORT:    Verbal report received from FortinoRN(name) on Dayna Holguin  being received from ED(unit) for routine progression of care      Report consisted of patients Situation, Background, Assessment and   Recommendations(SBAR). Information from the following report(s) SBAR, MAR and Recent Results was reviewed with the receiving nurse. Opportunity for questions and clarification was provided. Assessment completed upon patients arrival to unit and care assumed.        Awaiting patient transfer  Received patient rechecked BP after bolus   1820 changed cuff size to smaller cuff  Received critical result and paged provider to  to report critical result of lactic 2.2   1830 notified provider of lactic and BP meds stated to continue to monitor

## 2020-02-25 NOTE — ED NOTES
TRANSFER - ED to INPATIENT REPORT:    Verbal report given to ROSETTE Moreno(name) on Nurys How  being transferred to (unit) for routine progression of care       Report consisted of patients Situation, Background, Assessment and   Recommendations(SBAR). SBAR report made available to receiving floor on this patient being transferred to [address] [place] [address])  for routine progression of care       Admitting diagnosis CAP (community acquired pneumonia) [J18.9]    Information from the following report(s) SBAR, ED Summary, MAR and Recent Results was made available to receiving floor. Lines:   Peripheral IV 02/25/20 Left Antecubital (Active)   Site Assessment Clean, dry, & intact 2/25/2020  1:08 PM   Phlebitis Assessment 0 2/25/2020  1:08 PM   Infiltration Assessment 0 2/25/2020  1:08 PM   Dressing Status Clean, dry, & intact 2/25/2020  1:08 PM   Action Taken Blood drawn 2/25/2020  1:08 PM        HCG Status for ALL Females under 53 y/o: YES     Medication list updated today    Opportunity for questions and clarification was provided.       Patient is oriented to time, place, person and situation Sepsis summary done*  Patient is  continent and ambulatory with assist     Valuables transported with patient     Patient transported with:   Monitor  Registered Nurse    MAP (Monitor): 66 =Monitored (most recent)  Vitals w/ MEWS Score (last day)     Date/Time MEWS Score Pulse Resp Temp BP Level of Consciousness SpO2    02/25/20 1645    91  14    102/53    94 %    02/25/20 1630    93  14    109/56    94 %    02/25/20 1600    93  12    113/57    94 %    02/25/20 1545    92  15    101/52    95 %    02/25/20 1345    (!) 105  17    104/59    96 %    02/25/20 1316        98.7 °F (37.1 °C)          02/25/20 1245    (!) 105  17    117/56    94 %    02/25/20 1230    (!) 108  22    120/55    93 %    02/25/20 1200          112/52        02/25/20 1130    (!) 104  17    104/55    90 %    02/25/20 1100   (!) 105  17    117/55    95 %    02/25/20 1045    (!) 105  12    111/50    93 %    02/25/20 1030    (!) 108  14    114/52    93 %    02/25/20 10:25:56        (!) 100.6 °F (38.1 °C)    Alert      02/25/20 1000    (!) 108  16    132/61    95 %    02/25/20 0945    (!) 108  14    126/56    98 %    02/25/20 0930    (!) 107  20    (!) 82/52    99 %    02/25/20 0857  7  (!) 130  22  (!) 103 °F (39.4 °C)  129/69  Alert  94 %              Septic Patients:     Lactic Acid  Lab Results   Component Value Date    LACPOC 1.18 12/20/2019    LACPOC 0.7 04/07/2017    (Most recent on top)  Repeat drawn: YES Time: 1730     ALL LACTIC ACIDS GREATER THAN 2 MUST BE REPEATED POC WITHIN 4 HOURS OR PRIOR TO ADMISSION               Total Fluid Bolus initiated and documented on MAR: YES    All ordered antibiotics initiated within first 3 hours of TIME ZERO?   YES

## 2020-02-25 NOTE — ED PROVIDER NOTES
EMERGENCY DEPARTMENT HISTORY AND PHYSICAL EXAM    9:13 AM      Date: 2/25/2020  Patient Name: Anita Mayo    History of Presenting Illness     Chief Complaint   Patient presents with    Cough    Fever    Headache    Flu Like Symptoms    Chest Congestion         History Provided By: Patient and Caregiver      Additional History (Context): Anita Mayo is a 61 y.o. female who presents with increased cough fever chest pain. Patient states she does have a history of bronchitis she does use chronic inhalers at home she denies any tobacco use over the last 2 days she has had increased cough with pleuritic chest pain shaking chills and fever since yesterday. She did take some Advil this morning approximately 4 AM but when she began to feel worse with more coughing and chest pain came into the emergency department for evaluation. Cough is nonproductive inhalers have not been helping at home Motrin did not help significantly with her chest pain she is complaining of mild shortness of breath with wheeze with her cough. .    PCP: Elsie Gosselin, Antonio, MD      Current Facility-Administered Medications   Medication Dose Route Frequency Provider Last Rate Last Dose    sodium chloride (NS) flush 5-10 mL  5-10 mL IntraVENous PRN Ely Sharp MD        levoFLOXacin (LEVAQUIN) 750 mg in D5W IVPB  750 mg IntraVENous Q24H Ely Sharp  mL/hr at 02/25/20 1027 750 mg at 02/25/20 1027    gabapentin (NEURONTIN) capsule 300 mg  300 mg Oral TID Nataly Florence PA        . PHARMACY TO SUBSTITUTE PER PROTOCOL (Reordered from: Morphine (DORIS) 60 mg ER capsule)    Per Protocol Nataly Florence PA         Current Outpatient Medications   Medication Sig Dispense Refill    albuterol (PROVENTIL HFA, VENTOLIN HFA, PROAIR HFA) 90 mcg/actuation inhaler Take 2 Puffs by inhalation every four (4) hours as needed for Wheezing. 1 Inhaler 0    Morphine (DORIS) 60 mg ER capsule Take 90 mg by mouth daily.       amLODIPine-benazepril (LOTREL) 5-10 mg per capsule Take 1 Cap by mouth daily.  folic acid (FOLVITE) 1 mg tablet Take  by mouth daily.  gabapentin (NEURONTIN) 300 mg capsule Take 300 mg by mouth three (3) times daily.  adalimumab (HUMIRA) 40 mg/0.8 mL injection by SubCUTAneous route once. Past History     Past Medical History:  Past Medical History:   Diagnosis Date    Chronic obstructive pulmonary disease ([address]     Hypertension     Other ill-defined conditions(799.89)     chronic pain    Trauma        Past Surgical History:  Past Surgical History:   Procedure Laterality Date    HX ORTHOPAEDIC      repair fracture back       Family History:  History reviewed. No pertinent family history. Social History:  Social History     Tobacco Use    Smoking status: Never Smoker    Smokeless tobacco: Never Used   Substance Use Topics    Alcohol use: No    Drug use: No       Allergies: Allergies   Allergen Reactions    Penicillins Anaphylaxis         Review of Systems       Review of Systems   Constitutional: Positive for appetite change, chills, fatigue and fever. Negative for diaphoresis. HENT: Positive for congestion, postnasal drip and sinus pain. Eyes: Negative for visual disturbance. Respiratory: Positive for cough, chest tightness, shortness of breath and wheezing. Cardiovascular: Negative for chest pain. Gastrointestinal: Negative for abdominal pain, diarrhea, nausea and vomiting. Musculoskeletal: Negative for back pain. Skin: Negative for rash. Neurological: Negative for dizziness, syncope and weakness. All other systems reviewed and are negative. Physical Exam     Visit Vitals  /55   Pulse (!) 104   Temp (!) 100.6 °F (38.1 °C)   Resp 17   Ht 5' 2\" (1.575 m)   Wt 69.4 kg (153 lb)   SpO2 90%   BMI 27.98 kg/m²       Physical Exam  Vitals signs and nursing note reviewed. Constitutional:       General: She is not in acute distress.      Appearance: She is well-developed. She is not ill-appearing. HENT:      Head: Normocephalic and atraumatic. Nose: Congestion and rhinorrhea present. Eyes:      General: No scleral icterus. Conjunctiva/sclera: Conjunctivae normal.      Pupils: Pupils are equal, round, and reactive to light. Neck:      Musculoskeletal: Normal range of motion and neck supple. Cardiovascular:      Rate and Rhythm: Regular rhythm. Tachycardia present. Heart sounds: Normal heart sounds. No murmur. Pulmonary:      Effort: Pulmonary effort is normal. Tachypnea and prolonged expiration present. No accessory muscle usage or respiratory distress. Breath sounds: Examination of the right-lower field reveals rhonchi. Wheezing and rhonchi present. Abdominal:      General: Bowel sounds are normal. There is no distension. Palpations: Abdomen is soft. Tenderness: There is no abdominal tenderness. Lymphadenopathy:      Cervical: No cervical adenopathy. Skin:     General: Skin is warm and dry. Findings: No rash. Neurological:      Mental Status: She is alert and oriented to person, place, and time.       Coordination: Coordination normal.   Psychiatric:         Behavior: Behavior normal.           Diagnostic Study Results     Labs -  Recent Results (from the past 12 hour(s))   EKG, 12 LEAD, INITIAL    Collection Time: 02/25/20  8:44 AM   Result Value Ref Range    Ventricular Rate 127 BPM    Atrial Rate 127 BPM    P-R Interval 140 ms    QRS Duration 68 ms    Q-T Interval 286 ms    QTC Calculation (Bezet) 415 ms    Calculated P Axis 52 degrees    Calculated R Axis -9 degrees    Calculated T Axis 50 degrees    Diagnosis       Sinus tachycardia  Otherwise normal ECG  When compared with ECG of 20-DEC-2019 17:38,  No significant change was found     METABOLIC PANEL, COMPREHENSIVE    Collection Time: 02/25/20  9:14 AM   Result Value Ref Range    Sodium 137 136 - 145 mmol/L    Potassium 3.9 3.5 - 5.5 mmol/L    Chloride 106 100 - 111 mmol/L    CO2 24 21 - 32 mmol/L    Anion gap 7 3.0 - 18 mmol/L    Glucose 102 (H) 74 - 99 mg/dL    BUN 15 7.0 - 18 MG/DL    Creatinine 0.78 0.6 - 1.3 MG/DL    BUN/Creatinine ratio 19 12 - 20      GFR est AA >60 >60 ml/min/1.73m2    GFR est non-AA >60 >60 ml/min/1.73m2    Calcium 8.9 8.5 - 10.1 MG/DL    Bilirubin, total 0.8 0.2 - 1.0 MG/DL    ALT (SGPT) 72 (H) 13 - 56 U/L    AST (SGOT) 89 (H) 10 - 38 U/L    Alk. phosphatase 121 (H) 45 - 117 U/L    Protein, total 7.8 6.4 - 8.2 g/dL    Albumin 3.5 3.4 - 5.0 g/dL    Globulin 4.3 (H) 2.0 - 4.0 g/dL    A-G Ratio 0.8 0.8 - 1.7     CBC WITH AUTOMATED DIFF    Collection Time: 02/25/20  9:14 AM   Result Value Ref Range    WBC 7.9 4.6 - 13.2 K/uL    RBC 4.38 4.20 - 5.30 M/uL    HGB 12.8 12.0 - 16.0 g/dL    HCT 39.1 35.0 - 45.0 %    MCV 89.3 74.0 - 97.0 FL    MCH 29.2 24.0 - 34.0 PG    MCHC 32.7 31.0 - 37.0 g/dL    RDW 13.0 11.6 - 14.5 %    PLATELET 098 614 - 022 K/uL    MPV 9.1 (L) 9.2 - 11.8 FL    NEUTROPHILS 87 (H) 40 - 73 %    LYMPHOCYTES 11 (L) 21 - 52 %    MONOCYTES 2 (L) 3 - 10 %    EOSINOPHILS 0 0 - 5 %    BASOPHILS 0 0 - 2 %    ABS. NEUTROPHILS 6.9 1.8 - 8.0 K/UL    ABS. LYMPHOCYTES 0.9 0.9 - 3.6 K/UL    ABS. MONOCYTES 0.2 0.05 - 1.2 K/UL    ABS. EOSINOPHILS 0.0 0.0 - 0.4 K/UL    ABS.  BASOPHILS 0.0 0.0 - 0.1 K/UL    DF AUTOMATED     INFLUENZA A & B AG (RAPID TEST)    Collection Time: 02/25/20  9:15 AM   Result Value Ref Range    Influenza A Antigen NEGATIVE  NEG      Influenza B Antigen NEGATIVE  NEG     LACTIC ACID    Collection Time: 02/25/20  9:15 AM   Result Value Ref Range    Lactic acid 2.2 (HH) 0.4 - 2.0 MMOL/L   URINALYSIS W/ RFLX MICROSCOPIC    Collection Time: 02/25/20  9:15 AM   Result Value Ref Range    Color YELLOW      Appearance CLEAR      Specific gravity 1.008 1.005 - 1.030      pH (UA) 7.0 5.0 - 8.0      Protein NEGATIVE  NEG mg/dL    Glucose NEGATIVE  NEG mg/dL    Ketone NEGATIVE  NEG mg/dL    Bilirubin NEGATIVE  NEG      Blood NEGATIVE  NEG Urobilinogen 0.2 0.2 - 1.0 EU/dL    Nitrites NEGATIVE  NEG      Leukocyte Esterase NEGATIVE  NEG     LACTIC ACID    Collection Time: 02/25/20 11:33 AM   Result Value Ref Range    Lactic acid 2.3 (HH) 0.4 - 2.0 MMOL/L       Radiologic Studies -   XR CHEST PORT   Final Result   IMPRESSION:      Developing right mid to lower lobe infiltrate. Small right pleural effusion. Medical Decision Making   I am the first provider for this patient. I reviewed the vital signs, available nursing notes, past medical history, past surgical history, family history and social history. Vital Signs-Reviewed the patient's vital signs. EKG: Sinus tachycardia rate of 127 no acute ST-T wave changes read by me at 8:50 AM    Records Reviewed: Nursing Notes and Old Medical Records (Time of Review: 9:13 AM)    ED Course: Progress Notes, Reevaluation, and Consults:      Provider Notes (Medical Decision Making):   MDM  Number of Diagnoses or Management Options  Community acquired pneumonia of right lower lobe of lung [facility]):   Diagnosis management comments: Did not have influenza vaccine this year. Now appears with fever cough chills upper respiratory concerning for influenza however she does have increased rhonchi on the right base will evaluate for possible pneumonia, fluids Tylenol Motrin for fever tachycardia patient does not clinically appear to have severe sepsis able to ambulate about the emergency department without respiratory distress or dizziness    12:20 PM  Repeat lactic unchanged pt mildly tachycardic increased with ambulation discussed with Dr Danielle Neely agrees with admission        Amount and/or Complexity of Data Reviewed  Clinical lab tests: ordered and reviewed  Tests in the radiology section of CPT®: ordered and reviewed            Critical Care Time:       Diagnosis     Clinical Impression:   1.  Community acquired pneumonia of right lower lobe of lung ([address]        Disposition: admit    Follow-up Information None          Patient's Medications   Start Taking    No medications on file   Continue Taking    ADALIMUMAB (HUMIRA) 40 MG/0.8 ML INJECTION    by SubCUTAneous route once. ALBUTEROL (PROVENTIL HFA, VENTOLIN HFA, PROAIR HFA) 90 MCG/ACTUATION INHALER    Take 2 Puffs by inhalation every four (4) hours as needed for Wheezing. AMLODIPINE-BENAZEPRIL (LOTREL) 5-10 MG PER CAPSULE    Take 1 Cap by mouth daily. FOLIC ACID (FOLVITE) 1 MG TABLET    Take  by mouth daily. GABAPENTIN (NEURONTIN) 300 MG CAPSULE    Take 300 mg by mouth three (3) times daily. MORPHINE (DORIS) 60 MG ER CAPSULE    Take 90 mg by mouth daily. These Medications have changed    No medications on file   Stop Taking    ALBUTEROL (PROVENTIL HFA, VENTOLIN HFA, PROAIR HFA) 90 MCG/ACTUATION INHALER    Take 1 Puff by inhalation every four (4) hours as needed for Wheezing. IBUPROFEN (MOTRIN) 400 MG TABLET    Take 1 Tab by mouth every six (6) hours as needed for Pain. LIDOCAINE (LIDODERM) 5 %    Apply patch to the affected area for 12 hours a day and remove for 12 hours a day.     _______________________________    Please note that this dictation was completed with [email or web address], the computer voice recognition software. Quite often unanticipated grammatical, syntax, homophones, and other interpretive errors are inadvertently transcribed by the computer software. Please disregard these errors. Please excuse any errors that have escaped final proofreading.

## 2020-02-25 NOTE — ED NOTES
Pt transported by this RN. Pt VSS, excluding consistently low MAPs. 3S RN made aware in telephone report.  Dr. Isabella Truong already aware @[phone]

## 2020-02-26 LAB
ANION GAP SERPL CALC-SCNC: 7 MMOL/L (ref 3–18)
BUN SERPL-MCNC: 10 MG/DL (ref 7–18)
BUN/CREAT SERPL: 16 (ref 12–20)
CALCIUM SERPL-MCNC: 8.7 MG/DL (ref 8.5–10.1)
CHLORIDE SERPL-SCNC: 111 MMOL/L (ref 100–111)
CO2 SERPL-SCNC: 24 MMOL/L (ref 21–32)
CREAT SERPL-MCNC: 0.62 MG/DL (ref 0.6–1.3)
ERYTHROCYTE [DISTWIDTH] IN BLOOD BY AUTOMATED COUNT: 13.8 % (ref 11.6–14.5)
GLUCOSE SERPL-MCNC: 76 MG/DL (ref 74–99)
HCT VFR BLD AUTO: 36.1 % (ref 35–45)
HGB BLD-MCNC: 11.6 G/DL (ref 12–16)
L PNEUMO AG UR QL IA: NEGATIVE
MCH RBC QN AUTO: 29.2 PG (ref 24–34)
MCHC RBC AUTO-ENTMCNC: 32.1 G/DL (ref 31–37)
MCV RBC AUTO: 90.9 FL (ref 74–97)
PLATELET # BLD AUTO: 309 K/UL (ref 135–420)
PMV BLD AUTO: 9.9 FL (ref 9.2–11.8)
POTASSIUM SERPL-SCNC: 4.1 MMOL/L (ref 3.5–5.5)
RBC # BLD AUTO: 3.97 M/UL (ref 4.2–5.3)
S PNEUM AG UR QL: NEGATIVE
SODIUM SERPL-SCNC: 142 MMOL/L (ref 136–145)
WBC # BLD AUTO: 25.6 K/UL (ref 4.6–13.2)

## 2020-02-26 PROCEDURE — 74011250636 HC RX REV CODE- 250/636: Performed by: PHYSICIAN ASSISTANT

## 2020-02-26 PROCEDURE — 74011250637 HC RX REV CODE- 250/637: Performed by: PHYSICIAN ASSISTANT

## 2020-02-26 PROCEDURE — 85027 COMPLETE CBC AUTOMATED: CPT

## 2020-02-26 PROCEDURE — 36415 COLL VENOUS BLD VENIPUNCTURE: CPT

## 2020-02-26 PROCEDURE — 77030029684 HC NEB SM VOL KT MONA -A

## 2020-02-26 PROCEDURE — 99218 HC RM OBSERVATION: CPT

## 2020-02-26 PROCEDURE — 74011250637 HC RX REV CODE- 250/637: Performed by: HOSPITALIST

## 2020-02-26 PROCEDURE — 80048 BASIC METABOLIC PNL TOTAL CA: CPT

## 2020-02-26 PROCEDURE — 74011000250 HC RX REV CODE- 250: Performed by: PHYSICIAN ASSISTANT

## 2020-02-26 PROCEDURE — 96361 HYDRATE IV INFUSION ADD-ON: CPT

## 2020-02-26 PROCEDURE — 74011000250 HC RX REV CODE- 250: Performed by: HOSPITALIST

## 2020-02-26 PROCEDURE — 65660000000 HC RM CCU STEPDOWN

## 2020-02-26 PROCEDURE — 96372 THER/PROPH/DIAG INJ SC/IM: CPT

## 2020-02-26 RX ORDER — MORPHINE SULFATE 30 MG/1
30 TABLET ORAL
COMMUNITY
Start: 2020-02-17 | End: 2020-07-25 | Stop reason: ALTCHOICE

## 2020-02-26 RX ORDER — MORPHINE SULFATE 15 MG/1
30 TABLET ORAL
Status: DISCONTINUED | OUTPATIENT
Start: 2020-02-26 | End: 2020-02-27 | Stop reason: HOSPADM

## 2020-02-26 RX ORDER — GABAPENTIN 800 MG/1
800 TABLET ORAL 4 TIMES DAILY
COMMUNITY
Start: 2019-12-14 | End: 2020-07-25

## 2020-02-26 RX ORDER — GABAPENTIN 400 MG/1
800 CAPSULE ORAL 4 TIMES DAILY
Status: DISCONTINUED | OUTPATIENT
Start: 2020-02-26 | End: 2020-02-27 | Stop reason: HOSPADM

## 2020-02-26 RX ORDER — MORPHINE SULFATE 15 MG/1
60 TABLET, FILM COATED, EXTENDED RELEASE ORAL EVERY 8 HOURS
Status: DISCONTINUED | OUTPATIENT
Start: 2020-02-26 | End: 2020-02-27 | Stop reason: HOSPADM

## 2020-02-26 RX ORDER — MORPHINE SULFATE 60 MG/1
60 TABLET, FILM COATED, EXTENDED RELEASE ORAL 3 TIMES DAILY
COMMUNITY
Start: 2020-01-19 | End: 2020-08-27

## 2020-02-26 RX ORDER — LIDOCAINE 4 G/100G
2 PATCH TOPICAL EVERY 24 HOURS
Status: DISCONTINUED | OUTPATIENT
Start: 2020-02-26 | End: 2020-02-27 | Stop reason: HOSPADM

## 2020-02-26 RX ORDER — LIDOCAINE 4 G/100G
2 PATCH TOPICAL EVERY 24 HOURS
Status: DISCONTINUED | OUTPATIENT
Start: 2020-02-26 | End: 2020-02-26

## 2020-02-26 RX ADMIN — IPRATROPIUM BROMIDE AND ALBUTEROL SULFATE 3 ML: .5; 3 SOLUTION RESPIRATORY (INHALATION) at 16:38

## 2020-02-26 RX ADMIN — GUAIFENESIN 600 MG: 600 TABLET, EXTENDED RELEASE ORAL at 08:54

## 2020-02-26 RX ADMIN — GABAPENTIN 300 MG: 300 CAPSULE ORAL at 08:54

## 2020-02-26 RX ADMIN — SODIUM CHLORIDE 100 ML/HR: 900 INJECTION, SOLUTION INTRAVENOUS at 04:19

## 2020-02-26 RX ADMIN — ENOXAPARIN SODIUM 40 MG: 40 INJECTION, SOLUTION INTRAVENOUS; SUBCUTANEOUS at 13:40

## 2020-02-26 RX ADMIN — MORPHINE SULFATE 60 MG: 15 TABLET, FILM COATED, EXTENDED RELEASE ORAL at 21:10

## 2020-02-26 RX ADMIN — GABAPENTIN 800 MG: 400 CAPSULE ORAL at 17:39

## 2020-02-26 RX ADMIN — ACETAMINOPHEN 650 MG: 325 TABLET, FILM COATED ORAL at 04:19

## 2020-02-26 RX ADMIN — MORPHINE SULFATE 60 MG: 15 TABLET, FILM COATED, EXTENDED RELEASE ORAL at 13:40

## 2020-02-26 RX ADMIN — LEVOFLOXACIN 750 MG: 5 INJECTION, SOLUTION INTRAVENOUS at 10:27

## 2020-02-26 RX ADMIN — Medication 10 ML: at 14:00

## 2020-02-26 RX ADMIN — GUAIFENESIN 600 MG: 600 TABLET, EXTENDED RELEASE ORAL at 21:12

## 2020-02-26 RX ADMIN — GABAPENTIN 800 MG: 400 CAPSULE ORAL at 21:12

## 2020-02-26 RX ADMIN — Medication 10 ML: at 05:34

## 2020-02-26 RX ADMIN — MORPHINE SULFATE 60 MG: 15 TABLET, FILM COATED, EXTENDED RELEASE ORAL at 10:27

## 2020-02-26 RX ADMIN — Medication 10 ML: at 21:37

## 2020-02-26 RX ADMIN — GABAPENTIN 800 MG: 400 CAPSULE ORAL at 13:40

## 2020-02-26 RX ADMIN — ACETAMINOPHEN 650 MG: 325 TABLET, FILM COATED ORAL at 08:54

## 2020-02-26 RX ADMIN — ACETAMINOPHEN 650 MG: 325 TABLET, FILM COATED ORAL at 17:39

## 2020-02-26 NOTE — PROGRESS NOTES
Internal Medicine Progress Note    Patient's Name: Yue Martinez  Admit Date: 2/25/2020  Length of Stay: 0      Assessment/Plan     Principal Problem:    CAP (community acquired pneumonia) (2/25/2020)    Active Problems:    COPD (chronic obstructive pulmonary disease) ([address] (2/25/2020)      HTN (hypertension) (2/25/2020)      Chronic pain (8/8/2014)      CAP  - cont levaquin, mucinex  - monitor cultures  - monitor vitals  - currently stable on RA  - cont IV fluids  - WBC jumped 7.9 -> 25.6; likely delayed immune response from PNA. Will not adjust abx as patient remains afebrile and HR normalized     COPD  - PRN nebs     HTN  - hold home medications d/t borderline BP  - cont IV fluids  - BP improved     Chronic pain  - cont home meds    - Cont acceptable home medications for chronic conditions   - DVT protocol    I have personally reviewed all pertinent labs and films that have officially resulted over the last 24 hours. I have personally checked for all pending labs that are awaiting final results. HPI     Per H&P, Angelica Israel is a 61 y.o. female with a PMHx of COPD, HTN who presented to the ED with c/o cough and pleuritic chest pain that started yesterday associated with chills and subjective fever. Daughter at bedside assisting with translation.      In the ED, patient was found to be febrile (max 103) with intermittent hypotension and mild tachycardiac. WBC wnl. Flu swab negative. CXR shows RML/RLL infiltrate with small right pleural effusion. O2 sats stable on RA. UA negative. Trop negative x2. EKG with sinus tach. Patient will be admitted for further evaluation and treatment.     Daughter cell: [phone] [facility]     Patient improved clinically. WBC increased to 25.6. Cultures remain negative. Strep/legionella negative. Subjective     Pt s/e @ bedside. No major events overnight. Pt reports pleuritic chest/back pain. Denies abd pain, nvd.     Objective     Visit Vitals  /51 (BP 1 Location: Right arm, BP Patient Position: At rest)   Pulse 87   Temp 98.5 °F (36.9 °C)   Resp 18   Ht 5' 2\" (1.575 m)   Wt 69.4 kg (153 lb)   SpO2 92%   BMI 27.98 kg/m²       Physical Exam:  General Appearance: NAD, conversant  HENT: normocephalic/atraumatic, moist mucus membranes  Neck: No JVD, supple  Lungs: coarse breath sounds in RLL with normal respiratory effort  CV: RRR, no m/r/g  Abdomen: soft, non-tender, normal bowel sounds  Extremities: no cyanosis, no peripheral edema  Neuro: No focal deficits, motor/sensory intact  Skin: Normal color, intact      Intake and Output:  Current Shift:  No intake/output data recorded. Last three shifts:  02/24 1901 - 02/26 0700  In: 991 [I.V.:991]  Out: 775 [Urine:775]    Lab/Data Reviewed:  BMP:   Lab Results   Component Value Date/Time     02/26/2020 04:10 AM    K 4.1 02/26/2020 04:10 AM     02/26/2020 04:10 AM    CO2 24 02/26/2020 04:10 AM    AGAP 7 02/26/2020 04:10 AM    GLU 76 02/26/2020 04:10 AM    BUN 10 02/26/2020 04:10 AM    CREA 0.62 02/26/2020 04:10 AM    GFRAA >60 02/26/2020 04:10 AM    GFRNA >60 02/26/2020 04:10 AM     CBC:   Lab Results   Component Value Date/Time    WBC 25.6 (H) 02/26/2020 04:10 AM    HGB 11.6 (L) 02/26/2020 04:10 AM    HCT 36.1 02/26/2020 04:10 AM     02/26/2020 04:10 AM       Imaging Reviewed:  No results found.     Medications Reviewed:  Current Facility-Administered Medications   Medication Dose Route Frequency    morphine CR (MS CONTIN) tablet 60 mg  60 mg Oral Q8H    morphine IR (MS IR) tablet 30 mg  30 mg Oral TID PRN    gabapentin (NEURONTIN) capsule 800 mg  800 mg Oral QID    lidocaine 4 % patch 2 Patch  2 Patch TransDERmal Q24H    sodium chloride (NS) flush 5-10 mL  5-10 mL IntraVENous PRN    levoFLOXacin (LEVAQUIN) 750 mg in D5W IVPB  750 mg IntraVENous Q24H    sodium chloride (NS) flush 5-40 mL  5-40 mL IntraVENous Q8H    sodium chloride (NS) flush 5-40 mL  5-40 mL IntraVENous PRN    acetaminophen (TYLENOL) tablet 650 mg  650 mg Oral Q4H PRN    enoxaparin (LOVENOX) injection 40 mg  40 mg SubCUTAneous Q24H    guaiFENesin ER (MUCINEX) tablet 600 mg  600 mg Oral Q12H    albuterol-ipratropium (DUO-NEB) 2.5 MG-0.5 MG/3 ML  3 mL Nebulization Q4H PRN    naloxone (NARCAN) injection 0.4 mg  0.4 mg IntraVENous PRN         Hailey Langston PA-C  2 [facility]  Hospitalist Division  Office:  [phone]  Pager: [phone]

## 2020-02-26 NOTE — PROGRESS NOTES
Bedside and Written shift change report given to ROSETTE Moreno (oncoming nurse) by Justus Pak (offgoing nurse). Report included the following information SBAR, Kardex, MAR and Recent Results     Patient requesting breathing treatment and lidocaine patches for pain.    1130 lidocaine patches applied to rightposteriro shoulder

## 2020-02-26 NOTE — PROGRESS NOTES
Problem: Discharge Planning  Goal: *Discharge to safe environment  Outcome: Progressing Towards Goal    Home    Care Management Interventions  PCP Verified by CM: Yes  Palliative Care Criteria Met (RRAT>21 & CHF Dx)?: No  Transition of Care Consult (CM Consult): Discharge Planning  Current Support Network: (daughter )  Confirm Follow Up Transport: Family  The Plan for Transition of Care is Related to the Following Treatment Goals : home  Discharge Location  Discharge Placement: Home     Reason for Admission:   c/p                   RUR Score:      12               Plan for utilizing home health:     Not home bound     PCP: Alexx Mendoza                    Current Advanced Directive/Advance Care Plan: not on file                         Transition of Care Plan:                    She verified her demographics, insurance( none) and PCP as correct on the facesheet. Patient has designated Farhat Kamryn _[phone] dtr______________________ to participate in his/her discharge plan and to receive any needed information. Plan to return home family to transport       Observation notice provided in writing to patient and/or caregiver as well as verbal explanation of the policy. Patients who are in outpatient status also receive the Observation notice.

## 2020-02-26 NOTE — PROGRESS NOTES
Assume care of patient lying in bed requesting morphine for pain. Alert and oriented X 4. Burundian speaking with blue communication phone at bedside. Bed locked in lowest position. Call light within reach and understand to use assistance and needs. 02/26/2020    0200 Patient requesting pain medication for chest discomfort from pneumonia. Medication is scheduled. 0400 Tylenol 2 tabs administered orally for complaint of headache.     0500 Patient states tylenol did not relieve discomfort of headache.    0610 Resting quietly at present with eyes closed. 0800 Bedside and Verbal shift change report given to Oscar Lee RN (oncoming nurse) by Jasmin George RN (offgoing nurse). Report given with SBAR, Kardex, Intake/Output, MAR and Recent Results.

## 2020-02-26 NOTE — PROGRESS NOTES
conducted an initial consultation and Spiritual Assessment for Richelle Lira, who is a 61 y.o.,female. Patient's Primary Language is: Nepali. According to the patient's EMR Yarsanism Affiliation is: Yarsani. The reason the Patient came to the hospital is:   Patient Active Problem List    Diagnosis Date Noted    CAP (community acquired pneumonia) 02/25/2020    COPD (chronic obstructive pulmonary disease) ([facility] [address] 02/25/2020    HTN (hypertension) 02/25/2020    Chronic pain 08/08/2014    Headache(784.0) 07/27/2013    Cervical strain 07/27/2013    Unspecified asthma, with exacerbation 03/20/2013    Pneumonia, organism unspecified(486) 03/20/2013        The  provided the following Interventions:  Initiated a relationship of care and support. Explored issues of cuca, belief, spirituality and Spiritism/ritual needs while hospitalized. Listened empathically. Offered prayer and assurance of continued prayers on patient's behalf. The following outcomes where achieved:  Patient shared limited information about both their medical narrative and spiritual journey/beliefs.  confirmed Patient's Yarsanism Affiliation. Patient processed feeling about current hospitalization. Patient expressed gratitude for 's visit. Assessment:  Patient does not have any Spiritism/cultural needs that will affect patient's preferences in health care. There are no spiritual or Spiritism issues which require intervention at this time. Plan:  Chaplains will continue to follow and will provide pastoral care on an as needed/requested basis.  recommends bedside caregivers page  on duty if patient shows signs of acute spiritual or emotional distress.  Fr.  [address]   [phone]

## 2020-02-27 VITALS
WEIGHT: 153 LBS | TEMPERATURE: 98.1 F | BODY MASS INDEX: 28.16 KG/M2 | HEART RATE: 97 BPM | RESPIRATION RATE: 18 BRPM | OXYGEN SATURATION: 93 % | SYSTOLIC BLOOD PRESSURE: 153 MMHG | DIASTOLIC BLOOD PRESSURE: 76 MMHG | HEIGHT: 62 IN

## 2020-02-27 LAB
ATRIAL RATE: 127 BPM
BASOPHILS # BLD: 0 K/UL (ref 0–0.1)
BASOPHILS NFR BLD: 0 % (ref 0–2)
CALCULATED P AXIS, ECG09: 52 DEGREES
CALCULATED R AXIS, ECG10: -9 DEGREES
CALCULATED T AXIS, ECG11: 50 DEGREES
DIAGNOSIS, 93000: NORMAL
DIFFERENTIAL METHOD BLD: ABNORMAL
EOSINOPHIL # BLD: 0.4 K/UL (ref 0–0.4)
EOSINOPHIL NFR BLD: 3 % (ref 0–5)
ERYTHROCYTE [DISTWIDTH] IN BLOOD BY AUTOMATED COUNT: 13.8 % (ref 11.6–14.5)
HCT VFR BLD AUTO: 33 % (ref 35–45)
HGB BLD-MCNC: 10.7 G/DL (ref 12–16)
LYMPHOCYTES # BLD: 2.2 K/UL (ref 0.9–3.6)
LYMPHOCYTES NFR BLD: 14 % (ref 21–52)
MCH RBC QN AUTO: 29.1 PG (ref 24–34)
MCHC RBC AUTO-ENTMCNC: 32.4 G/DL (ref 31–37)
MCV RBC AUTO: 89.7 FL (ref 74–97)
MONOCYTES # BLD: 0.8 K/UL (ref 0.05–1.2)
MONOCYTES NFR BLD: 5 % (ref 3–10)
NEUTS SEG # BLD: 12.1 K/UL (ref 1.8–8)
NEUTS SEG NFR BLD: 78 % (ref 40–73)
P-R INTERVAL, ECG05: 140 MS
PLATELET # BLD AUTO: 268 K/UL (ref 135–420)
PMV BLD AUTO: 9.1 FL (ref 9.2–11.8)
Q-T INTERVAL, ECG07: 286 MS
QRS DURATION, ECG06: 68 MS
QTC CALCULATION (BEZET), ECG08: 415 MS
RBC # BLD AUTO: 3.68 M/UL (ref 4.2–5.3)
VENTRICULAR RATE, ECG03: 127 BPM
WBC # BLD AUTO: 15.5 K/UL (ref 4.6–13.2)

## 2020-02-27 PROCEDURE — 36415 COLL VENOUS BLD VENIPUNCTURE: CPT

## 2020-02-27 PROCEDURE — 85025 COMPLETE CBC W/AUTO DIFF WBC: CPT

## 2020-02-27 PROCEDURE — 74011250636 HC RX REV CODE- 250/636: Performed by: HOSPITALIST

## 2020-02-27 PROCEDURE — 74011250637 HC RX REV CODE- 250/637: Performed by: PHYSICIAN ASSISTANT

## 2020-02-27 PROCEDURE — 90686 IIV4 VACC NO PRSV 0.5 ML IM: CPT | Performed by: HOSPITALIST

## 2020-02-27 PROCEDURE — 90471 IMMUNIZATION ADMIN: CPT

## 2020-02-27 RX ORDER — LEVOFLOXACIN 750 MG/1
750 TABLET ORAL DAILY
Qty: 5 TAB | Refills: 0 | Status: SHIPPED | OUTPATIENT
Start: 2020-02-27 | End: 2020-03-03

## 2020-02-27 RX ORDER — IBUPROFEN 200 MG
600 TABLET ORAL
Status: DISCONTINUED | OUTPATIENT
Start: 2020-02-27 | End: 2020-02-27 | Stop reason: HOSPADM

## 2020-02-27 RX ORDER — LEVOFLOXACIN 750 MG/1
750 TABLET ORAL DAILY
Qty: 5 TAB | Refills: 0 | Status: SHIPPED | OUTPATIENT
Start: 2020-02-28 | End: 2020-02-27 | Stop reason: SDUPTHER

## 2020-02-27 RX ADMIN — GABAPENTIN 800 MG: 400 CAPSULE ORAL at 09:05

## 2020-02-27 RX ADMIN — MORPHINE SULFATE 60 MG: 15 TABLET, FILM COATED, EXTENDED RELEASE ORAL at 06:32

## 2020-02-27 RX ADMIN — GUAIFENESIN 600 MG: 600 TABLET, EXTENDED RELEASE ORAL at 09:00

## 2020-02-27 RX ADMIN — MORPHINE SULFATE 30 MG: 15 TABLET ORAL at 09:05

## 2020-02-27 RX ADMIN — Medication 10 ML: at 09:07

## 2020-02-27 RX ADMIN — INFLUENZA VIRUS VACCINE 0.5 ML: 15; 15; 15; 15 SUSPENSION INTRAMUSCULAR at 09:58

## 2020-02-27 NOTE — DISCHARGE SUMMARY
2 [facility]  Hospitalist Division    Discharge Summary    Patient: Dorota Hopkins MRN: [id number]  CSN: [id number]    YOB: 1960  Age: 61 y.o. Sex: female    DOA: 2/25/2020 LOS:  LOS: 1 day   Discharge Date:      Admission Diagnoses: CAP (community acquired pneumonia) [J18.9]  CAP (community acquired pneumonia) [J18.9]    Discharge Diagnoses:  Principal Problem:    CAP (community acquired pneumonia) (2/25/2020)    Active Problems:    COPD (chronic obstructive pulmonary disease) ([facility] [address] (2/25/2020)      HTN (hypertension) (2/25/2020)      Chronic pain (8/8/2014)        Discharge Condition: Stable    Discharge To: Home    Consults: None    HPI: Per H&P, \"Radha Solis is a 61 y.o. female with a PMHx of COPD, HTN who presented to the ED with c/o cough and pleuritic chest pain that started yesterday associated with chills and subjective fever. Daughter at bedside assisting with translation.      In the ED, patient was found to be febrile (max 103) with intermittent hypotension and mild tachycardiac. WBC wnl. Flu swab negative. CXR shows RML/RLL infiltrate with small right pleural effusion. O2 sats stable on RA. UA negative. Trop negative x2. EKG with sinus tach. Patient will be admitted for further evaluation and treatment.     Daughter cell: [phone] \"    Hospital Course: Patient improved clinically. WBC elevated at 25.6 on the next lab draw, but then trended down to 15.5 the following day without changing abx. Cultures remain negative. Strep/legionella negative. VS and labs stable for discharge. O2 stable on RA.       Physical Exam:  General Appearance: NAD, conversant  HENT: normocephalic/atraumatic, moist mucus membranes  Neck: No JVD, supple  Lungs: coarse breath sounds in RLL with normal respiratory effort  CV: RRR, no m/r/g  Abdomen: soft, non-tender, normal bowel sounds  Extremities: no cyanosis, no peripheral edema  Neuro: No focal deficits, motor/sensory intact  Skin: Normal color, intact    Significant Diagnostic Studies:     CBC:   Lab Results   Component Value Date/Time    WBC 15.5 (H) 02/27/2020 08:10 AM    HGB 10.7 (L) 02/27/2020 08:10 AM    HCT 33.0 (L) 02/27/2020 08:10 AM     02/27/2020 08:10 AM       Xr Chest Port    Result Date: 2/25/2020  EXAM: XR CHEST PORT CLINICAL INDICATION/HISTORY: meets SIRS criteria. Sepsis. -Additional: None COMPARISON: 12/20/2019 TECHNIQUE: Portable frontal view of the chest _______________ FINDINGS: SUPPORT DEVICES: None. HEART AND MEDIASTINUM: Normal size heart. LUNGS AND PLEURAL SPACES: Developing right mid to lower lobe infiltrate. Small right pleural effusion. No pneumothorax. BONY THORAX AND SOFT TISSUES: Unremarkable. _______________     IMPRESSION: Developing right mid to lower lobe infiltrate. Small right pleural effusion. Procedures: None    Discharge Medications:     Current Discharge Medication List      START taking these medications    Details   levoFLOXacin (LEVAQUIN) 750 mg tablet Take 1 Tab by mouth daily for 5 days. Qty: 5 Tab, Refills: 0         CONTINUE these medications which have NOT CHANGED    Details   morphine CR (MS CONTIN) 60 mg CR tablet Take 60 mg by mouth three (3) times daily. morphine IR (MS IR) 30 mg tablet Take 30 mg by mouth three (3) times daily as needed. gabapentin (NEURONTIN) 800 mg tablet Take 800 mg by mouth four (4) times daily. albuterol (PROVENTIL HFA, VENTOLIN HFA, PROAIR HFA) 90 mcg/actuation inhaler Take 2 Puffs by inhalation every four (4) hours as needed for Wheezing. Qty: 1 Inhaler, Refills: 0      amLODIPine-benazepril (LOTREL) 5-10 mg per capsule Take 1 Cap by mouth daily. folic acid (FOLVITE) 1 mg tablet Take  by mouth daily. adalimumab (HUMIRA) 40 mg/0.8 mL injection by SubCUTAneous route once.          STOP taking these medications       gabapentin (NEURONTIN) 300 mg capsule Comments:   Reason for Stopping: not a current medication Activity: Activity as tolerated    Diet: Resume previous diet    Wound Care: None needed    Follow-up: 1 week with PCP    Discharge time: >35 minutes    [place] Mondr[place] [id number]  Office:  [phone]  Pager: [phone]      2/27/2020, 8:46 AM

## 2020-02-27 NOTE — PROGRESS NOTES
Discharge/Transition Planning   Problem: Discharge Planning  Goal: *Discharge to safe environment  Outcome: Resolved/Met   Home and oupt follow up    Care Management Interventions  PCP Verified by CM:  Yes  Palliative Care Criteria Met (RRAT>21 & CHF Dx)?: No  Transition of Care Consult (CM Consult): Discharge Planning  Current Support Network: (daughter )  Confirm Follow Up Transport: Family  The Plan for Transition of Care is Related to the Following Treatment Goals : resolution of acute symptoms and d/c home with PCP follow up  Discharge Location  Discharge Placement: Home

## 2020-02-27 NOTE — PROGRESS NOTES
0847-  Bedside and Verbal shift change report given to Pavel Cornelius RN (oncoming nurse) by Deandra Schneider RN (offgoing nurse). Report included the following information SBAR, Kardex, Intake/Output, MAR and Recent Results.

## 2020-02-27 NOTE — PROGRESS NOTES
Discharge instructions provided to pt on behalf of primary nurse Ritchie Alberts. Prescription for levaquin was faxed to the [facility] [address] to be delivered to the pt room. Recommended follow up appointment reviewed. Plan for discharge to home. Pt to take cab home once prescription delivered.

## 2020-02-27 NOTE — PROGRESS NOTES
Trudy 0548 care from ROSETTE Porras. Pt in bed resting alert and oriented x 4 states that she is comfortable for now . Assessement completed plan of care for the shift explained pt verbalized understandingHOB elevated, bed low and in locked position. Call light and frequently used items within reach. Pt states that her daughter Is coming to give her  Shower. While in the room the daughter arrived. IV secured with plastic bag to keep it from being wet. Towels and other supplies provided. for a shower. Pt Divehi speaking, blue phone at bed side. 2030- Pt completed taking a shower. No concerns voiced  2112- Pt given scheduled med including pain med. Pain 7/10. Pt resting in bed watching tv  2210- Pt stated pain down to 6. Pt resting in bed watching tv.  0009 Pt c/o headache- tylenol 650 mg po given. 0200- Pt sleeping  0412- Pt resting  0630-Pt given scheduled pain med. Pt requests that her pain med may be combined with gabapentin. Gabapentin is not due until 9am.  0752- Bedside and Verbal shift change report given to Lindsay Aadms (oncoming nurse) by Taya Panda RN (offgoing nurse). Report included the following information SBAR, Kardex, Intake/Output, MAR and Recent Results.

## 2020-02-27 NOTE — DISCHARGE INSTRUCTIONS
DISCHARGE SUMMARY from Nurse    PATIENT INSTRUCTIONS:    After general anesthesia or intravenous sedation, for 24 hours or while taking prescription Narcotics:  · Limit your activities  · Do not drive and operate hazardous machinery  · Do not make important personal or business decisions  · Do  not drink alcoholic beverages  · If you have not urinated within 8 hours after discharge, please contact your surgeon on call. Report the following to your surgeon:  · Excessive pain, swelling, redness or odor of or around the surgical area  · Temperature over 100.5  · Nausea and vomiting lasting longer than 4 hours or if unable to take medications  · Any signs of decreased circulation or nerve impairment to extremity: change in color, persistent  numbness, tingling, coldness or increase pain  · Any questions    What to do at Home:  Recommended activity: Activity as tolerated    If you experience any of the following symptoms fever, chills, worsening shortness of breath, or thick green/brown sputum, please follow up with primary care physician/emergency room. *  Please give a list of your current medications to your Primary Care Provider. *  Please update this list whenever your medications are discontinued, doses are      changed, or new medications (including over-the-counter products) are added. *  Please carry medication information at all times in case of emergency situations. These are general instructions for a healthy lifestyle:    No smoking/ No tobacco products/ Avoid exposure to second hand smoke  Surgeon General's Warning:  Quitting smoking now greatly reduces serious risk to your health.     Obesity, smoking, and sedentary lifestyle greatly increases your risk for illness    A healthy diet, regular physical exercise & weight monitoring are important for maintaining a healthy lifestyle    You may be retaining fluid if you have a history of heart failure or if you experience any of the following symptoms:  Weight gain of 3 pounds or more overnight or 5 pounds in a week, increased swelling in our hands or feet or shortness of breath while lying flat in bed. Please call your doctor as soon as you notice any of these symptoms; do not wait until your next office visit. The discharge information has been reviewed with the patient. The patient verbalized understanding. Discharge medications reviewed with the patient and appropriate educational materials and side effects teaching were provided. Patient armband removed and shredded.

## 2020-02-27 NOTE — PROGRESS NOTES
Problem: Falls - Risk of  Goal: *Absence of Falls  Description  Document Ej Kan Fall Risk and appropriate interventions in the flowsheet.   Outcome: Progressing Towards Goal  Note: Fall Risk Interventions:  Mobility Interventions: Patient to call before getting OOB         Medication Interventions: Bed/chair exit alarm, Patient to call before getting OOB    Elimination Interventions: Call light in reach              Problem: Patient Education: Go to Patient Education Activity  Goal: Patient/Family Education  Outcome: Progressing Towards Goal     Problem: Pain  Goal: *Control of Pain  Outcome: Progressing Towards Goal     Problem: Patient Education: Go to Patient Education Activity  Goal: Patient/Family Education  Outcome: Progressing Towards Goal     Problem: Patient Education: Go to Patient Education Activity  Goal: Patient/Family Education  Outcome: Progressing Towards Goal     Problem: Pneumonia: Day 1  Goal: Off Pathway (Use only if patient is Off Pathway)  Outcome: Progressing Towards Goal  Goal: Activity/Safety  Outcome: Progressing Towards Goal  Goal: Consults, if ordered  Outcome: Progressing Towards Goal  Goal: Diagnostic Test/Procedures  Outcome: Progressing Towards Goal  Goal: Nutrition/Diet  Outcome: Progressing Towards Goal  Goal: Medications  Outcome: Progressing Towards Goal  Goal: Respiratory  Outcome: Progressing Towards Goal  Goal: Treatments/Interventions/Procedures  Outcome: Progressing Towards Goal  Goal: Psychosocial  Outcome: Progressing Towards Goal  Goal: *Oxygen saturation within defined limits  Outcome: Progressing Towards Goal  Goal: *Influenza vaccine administered (October-March)  Outcome: Progressing Towards Goal  Goal: *Hemodynamically stable  Outcome: Progressing Towards Goal  Goal: *Demonstrates progressive activity  Outcome: Progressing Towards Goal  Goal: *Tolerating diet  Outcome: Progressing Towards Goal     Problem: Pneumonia: Day 2  Goal: Activity/Safety  Outcome: Progressing Towards Goal  Goal: Diagnostic Test/Procedures  Outcome: Progressing Towards Goal  Goal: Nutrition/Diet  Outcome: Progressing Towards Goal  Goal: Discharge Planning  Outcome: Progressing Towards Goal  Goal: Medications  Outcome: Progressing Towards Goal  Goal: Respiratory  Outcome: Progressing Towards Goal  Goal: Treatments/Interventions/Procedures  Outcome: Progressing Towards Goal  Goal: Psychosocial  Outcome: Progressing Towards Goal  Goal: *Oxygen saturation within defined limits  Outcome: Progressing Towards Goal  Goal: *Demonstrates progressive activity  Outcome: Progressing Towards Goal  Goal: *Tolerating diet  Outcome: Progressing Towards Goal     Problem: Pneumonia: Day 3  Goal: Activity/Safety  Outcome: Progressing Towards Goal  Goal: Diagnostic Test/Procedures  Outcome: Progressing Towards Goal  Goal: Nutrition/Diet  Outcome: Progressing Towards Goal  Goal: Medications  Outcome: Progressing Towards Goal  Goal: Respiratory  Outcome: Progressing Towards Goal  Goal: Treatments/Interventions/Procedures  Outcome: Progressing Towards Goal  Goal: Psychosocial  Outcome: Progressing Towards Goal  Goal: *Demonstrates progressive activity  Outcome: Progressing Towards Goal  Goal: *Tolerating diet  Outcome: Progressing Towards Goal     Problem: Pneumonia: Day 4  Goal: Activity/Safety  Outcome: Progressing Towards Goal  Goal: Nutrition/Diet  Outcome: Progressing Towards Goal  Goal: Discharge Planning  Outcome: Progressing Towards Goal  Goal: Medications  Outcome: Progressing Towards Goal  Goal: Respiratory  Outcome: Progressing Towards Goal  Goal: Treatments/Interventions/Procedures  Outcome: Progressing Towards Goal  Goal: Psychosocial  Outcome: Progressing Towards Goal     Problem: Pneumonia: Discharge Outcomes  Goal: *Demonstrates progressive activity  Outcome: Progressing Towards Goal  Goal: *Describes follow-up/return visits to physicians  Outcome: Progressing Towards Goal  Goal: *Tolerating diet  Outcome: Progressing Towards Goal  Goal: *Verbalizes name, dosage, time, side effects, and number of days to continue medications  Outcome: Progressing Towards Goal  Goal: *Influenza immunization  Outcome: Progressing Towards Goal  Goal: *Pneumococcal immunization  Outcome: Progressing Towards Goal  Goal: *Respiratory status at baseline  Outcome: Progressing Towards Goal  Goal: *Vital signs within defined limits  Outcome: Progressing Towards Goal  Goal: *Describes available resources and support systems  Outcome: Progressing Towards Goal  Goal: *Optimal pain control at patient's stated goal  Outcome: Progressing Towards Goal     Problem: Discharge Planning  Goal: *Discharge to safe environment  Outcome: Progressing Towards Goal

## 2020-03-02 NOTE — PROGRESS NOTES
To Whom it May Concern,    Ms Cathryn Sal was Hospitalized from 2/25/20 through 2/27/20 at [facility] for medical care. Please call for questions. [phone].     Sincerely,        Shanae Ward MD

## 2020-07-25 ENCOUNTER — HOSPITAL ENCOUNTER (EMERGENCY)
Age: 60
Discharge: HOME | End: 2020-07-25
Attending: EMERGENCY MEDICINE

## 2020-07-25 VITALS
HEART RATE: 96 BPM | SYSTOLIC BLOOD PRESSURE: 171 MMHG | DIASTOLIC BLOOD PRESSURE: 68 MMHG | RESPIRATION RATE: 15 BRPM | OXYGEN SATURATION: 99 % | TEMPERATURE: 98.1 F

## 2020-07-25 VITALS
DIASTOLIC BLOOD PRESSURE: 83 MMHG | SYSTOLIC BLOOD PRESSURE: 122 MMHG | RESPIRATION RATE: 17 BRPM | WEIGHT: 160 LBS | HEIGHT: 58 IN | OXYGEN SATURATION: 98 % | TEMPERATURE: 97 F | BODY MASS INDEX: 33.58 KG/M2 | HEART RATE: 95 BPM

## 2020-07-25 DIAGNOSIS — G89.29 CHRONIC MIDLINE LOW BACK PAIN WITH BILATERAL SCIATICA: Primary | ICD-10-CM

## 2020-07-25 DIAGNOSIS — M54.41 CHRONIC MIDLINE LOW BACK PAIN WITH BILATERAL SCIATICA: Primary | ICD-10-CM

## 2020-07-25 DIAGNOSIS — M54.50 CHRONIC LOW BACK PAIN WITHOUT SCIATICA, UNSPECIFIED BACK PAIN LATERALITY: Primary | ICD-10-CM

## 2020-07-25 DIAGNOSIS — M54.42 CHRONIC MIDLINE LOW BACK PAIN WITH BILATERAL SCIATICA: Primary | ICD-10-CM

## 2020-07-25 DIAGNOSIS — R20.2 PARESTHESIA OF BOTH FEET: ICD-10-CM

## 2020-07-25 DIAGNOSIS — G89.29 CHRONIC LOW BACK PAIN WITHOUT SCIATICA, UNSPECIFIED BACK PAIN LATERALITY: Primary | ICD-10-CM

## 2020-07-25 DIAGNOSIS — Z87.828 HISTORY OF TRAUMA TO SPINE: ICD-10-CM

## 2020-07-25 LAB
APPEARANCE UR: CLEAR
BILIRUB UR QL: NEGATIVE
COLOR UR: YELLOW
GLUCOSE UR STRIP.AUTO-MCNC: NEGATIVE MG/DL
HGB UR QL STRIP: NEGATIVE
KETONES UR QL STRIP.AUTO: NEGATIVE MG/DL
LEUKOCYTE ESTERASE UR QL STRIP.AUTO: NEGATIVE
NITRITE UR QL STRIP.AUTO: NEGATIVE
PH UR STRIP: >8.5 [PH] (ref 5–8)
PROT UR STRIP-MCNC: NEGATIVE MG/DL
SP GR UR REFRACTOMETRY: 1.01 (ref 1–1.03)
UROBILINOGEN UR QL STRIP.AUTO: 0.2 EU/DL (ref 0.2–1)

## 2020-07-25 PROCEDURE — 99284 EMERGENCY DEPT VISIT MOD MDM: CPT

## 2020-07-25 PROCEDURE — 81003 URINALYSIS AUTO W/O SCOPE: CPT

## 2020-07-25 PROCEDURE — 74011250637 HC RX REV CODE- 250/637: Performed by: EMERGENCY MEDICINE

## 2020-07-25 PROCEDURE — 74011636637 HC RX REV CODE- 636/637: Performed by: EMERGENCY MEDICINE

## 2020-07-25 PROCEDURE — 99283 EMERGENCY DEPT VISIT LOW MDM: CPT

## 2020-07-25 PROCEDURE — 74011000250 HC RX REV CODE- 250: Performed by: EMERGENCY MEDICINE

## 2020-07-25 RX ORDER — OXYCODONE AND ACETAMINOPHEN 5; 325 MG/1; MG/1
1 TABLET ORAL
Qty: 6 TAB | Refills: 0 | Status: SHIPPED | OUTPATIENT
Start: 2020-07-25 | End: 2020-07-28

## 2020-07-25 RX ORDER — IBUPROFEN 600 MG/1
600 TABLET ORAL
Status: COMPLETED | OUTPATIENT
Start: 2020-07-25 | End: 2020-07-25

## 2020-07-25 RX ORDER — OXYCODONE AND ACETAMINOPHEN 5; 325 MG/1; MG/1
1 TABLET ORAL
Status: COMPLETED | OUTPATIENT
Start: 2020-07-25 | End: 2020-07-25

## 2020-07-25 RX ORDER — CYCLOBENZAPRINE HCL 5 MG
5 TABLET ORAL
Qty: 12 TAB | Refills: 0 | OUTPATIENT
Start: 2020-07-25 | End: 2020-07-25

## 2020-07-25 RX ORDER — GABAPENTIN 300 MG/1
300 CAPSULE ORAL
Status: COMPLETED | OUTPATIENT
Start: 2020-07-25 | End: 2020-07-25

## 2020-07-25 RX ORDER — GABAPENTIN 800 MG/1
800 TABLET ORAL 3 TIMES DAILY
Qty: 15 TAB | Refills: 0 | Status: SHIPPED | OUTPATIENT
Start: 2020-07-25 | End: 2020-07-30

## 2020-07-25 RX ORDER — ACETAMINOPHEN 500 MG
1000 TABLET ORAL
Status: COMPLETED | OUTPATIENT
Start: 2020-07-25 | End: 2020-07-25

## 2020-07-25 RX ORDER — METHYLPREDNISOLONE 4 MG/1
TABLET ORAL
Qty: 1 DOSE PACK | Refills: 0 | OUTPATIENT
Start: 2020-07-25 | End: 2020-07-25

## 2020-07-25 RX ORDER — HYDROCODONE BITARTRATE AND ACETAMINOPHEN 5; 325 MG/1; MG/1
1 TABLET ORAL
Status: COMPLETED | OUTPATIENT
Start: 2020-07-25 | End: 2020-07-25

## 2020-07-25 RX ORDER — LIDOCAINE 50 MG/G
PATCH TOPICAL
Qty: 30 EACH | Refills: 0 | Status: SHIPPED | OUTPATIENT
Start: 2020-07-25 | End: 2020-08-27

## 2020-07-25 RX ORDER — CYCLOBENZAPRINE HCL 10 MG
5 TABLET ORAL
Status: COMPLETED | OUTPATIENT
Start: 2020-07-25 | End: 2020-07-25

## 2020-07-25 RX ORDER — LIDOCAINE 4 G/100G
1 PATCH TOPICAL EVERY 24 HOURS
Status: DISCONTINUED | OUTPATIENT
Start: 2020-07-25 | End: 2020-07-25 | Stop reason: HOSPADM

## 2020-07-25 RX ORDER — PREDNISONE 20 MG/1
60 TABLET ORAL
Status: DISCONTINUED | OUTPATIENT
Start: 2020-07-25 | End: 2020-07-25 | Stop reason: HOSPADM

## 2020-07-25 RX ORDER — CYCLOBENZAPRINE HCL 5 MG
10 TABLET ORAL 3 TIMES DAILY
Qty: 18 TAB | Refills: 0 | Status: SHIPPED | OUTPATIENT
Start: 2020-07-25 | End: 2020-07-28

## 2020-07-25 RX ADMIN — OXYCODONE HYDROCHLORIDE AND ACETAMINOPHEN 1 TABLET: 5; 325 TABLET ORAL at 09:19

## 2020-07-25 RX ADMIN — GABAPENTIN 500 MG: 300 CAPSULE ORAL at 09:19

## 2020-07-25 RX ADMIN — GABAPENTIN 300 MG: 300 CAPSULE ORAL at 08:29

## 2020-07-25 RX ADMIN — CYCLOBENZAPRINE 5 MG: 10 TABLET, FILM COATED ORAL at 08:29

## 2020-07-25 RX ADMIN — HYDROCODONE BITARTRATE AND ACETAMINOPHEN 1 TABLET: 5; 325 TABLET ORAL at 21:54

## 2020-07-25 RX ADMIN — ACETAMINOPHEN 1000 MG: 500 TABLET, FILM COATED ORAL at 08:28

## 2020-07-25 RX ADMIN — IBUPROFEN 600 MG: 600 TABLET, FILM COATED ORAL at 08:29

## 2020-07-25 NOTE — ED PROVIDER NOTES
Date: 7/25/2020  Patient Name: Amrit Oneil    History of Presenting Illness     Chief Complaint   Patient presents with    Back Pain       History Provided By: Patient      HPI/Chief Complaint: (Context):who presents with 2 days of back pain. Acute on chronic  Patient's and since the trauma occurred to her left back she is been having pain there is metal in her spine. Patient states she is the same pain she has been having shooting down her legs. None of this is new her feet feels warm there is no numbness or tingling but the warmth is not new either. She went to see her primary care physician yesterday told her that she had to be seen before medications could be given she is out of her morphine.   Patient denies any fever cough congestion coronavirus exposure no change in respiratory status no exertional chest pain or shortness of breath no focal arm or leg weakness no bowel or bladder incontinence no seizure no trauma  Patient has pain that is chronic in nature  All this information was communicated with the  bedside  Patient's pain is moderate  Patient has worsening of symptoms with walking or moving but better with rest.  No black or bloody stool no dysuria  ---  Patient MATTHEW level to arrival  Patient's with back pain  2 days of back pain no injury  Midline back pain radiating to the right leg and foot patient's pain is 8 out of 10  Patient's allergy to penicillin  Patient's home medication include Humira albuterol Lo-Trol Neurontin MS Contin  Medical history includes hypertension trauma chronic obstructive COPD  Orthopedic surgery for back  Social history is no smoking no alcohol no drugs  Patient's chart review patient was admitted for pneumonia in February 2020          PCP: Portillo Hensley MD    Current Facility-Administered Medications   Medication Dose Route Frequency Provider Last Rate Last Dose    lidocaine 4 % patch 1 Patch  1 Patch TransDERmal Q24H Cat Bhakta MD   1 Patch at 07/25/20 0829    predniSONE (DELTASONE) tablet 60 mg  60 mg Oral NOW Dileep Winn MD         Current Outpatient Medications   Medication Sig Dispense Refill    gabapentin (NEURONTIN) 800 mg tablet Take 1 Tab by mouth three (3) times daily for 5 days. Max Daily Amount: 2,400 mg. 15 Tab 0    lidocaine (LIDODERM) 5 % Apply patch to the affected area for 12 hours a day and remove for 12 hours a day. 30 Each 0    cyclobenzaprine (FLEXERIL) 5 mg tablet Take 1 Tab by mouth three (3) times daily as needed for Muscle Spasm(s). 12 Tab 0    methylPREDNISolone (Medrol, Raphael,) 4 mg tablet Use as prescribed and written by the  1 Dose Pack 0    morphine CR (MS CONTIN) 60 mg CR tablet Take 60 mg by mouth three (3) times daily.  morphine IR (MS IR) 30 mg tablet Take 30 mg by mouth three (3) times daily as needed.  albuterol (PROVENTIL HFA, VENTOLIN HFA, PROAIR HFA) 90 mcg/actuation inhaler Take 2 Puffs by inhalation every four (4) hours as needed for Wheezing. 1 Inhaler 0    amLODIPine-benazepril (LOTREL) 5-10 mg per capsule Take 1 Cap by mouth daily.  folic acid (FOLVITE) 1 mg tablet Take  by mouth daily.  adalimumab (HUMIRA) 40 mg/0.8 mL injection by SubCUTAneous route once. Past History     Past Medical History:  Past Medical History:   Diagnosis Date    Chronic obstructive pulmonary disease ([address]     Hypertension     Other ill-defined conditions(799.89)     chronic pain    Trauma        Past Surgical History:  Past Surgical History:   Procedure Laterality Date    HX ORTHOPAEDIC      repair fracture back       Family History:  History reviewed. No pertinent family history. Social History:  Social History     Tobacco Use    Smoking status: Never Smoker    Smokeless tobacco: Never Used   Substance Use Topics    Alcohol use: No    Drug use: No       Allergies:   Allergies   Allergen Reactions    Penicillins Anaphylaxis         Review of Systems   Review of Systems   Constitutional: Negative for activity change, fatigue and fever. HENT: Negative for congestion and rhinorrhea. Eyes: Negative for visual disturbance. Respiratory: Negative for shortness of breath. Cardiovascular: Negative for chest pain and palpitations. Gastrointestinal: Negative for abdominal pain, diarrhea, nausea and vomiting. Genitourinary: Negative for dysuria and hematuria. Musculoskeletal: Positive for back pain and myalgias. Skin: Negative for rash. Neurological: Negative for dizziness, weakness and light-headedness. Psychiatric/Behavioral: Negative for agitation. All other systems reviewed and are negative. Physical Exam     Physical Exam  Vitals signs and nursing note reviewed. Constitutional:       General: She is not in acute distress. Appearance: She is well-developed. HENT:      Head: Normocephalic and atraumatic. Right Ear: External ear normal.      Left Ear: External ear normal.      Nose: Nose normal.   Eyes:      General: No scleral icterus. Conjunctiva/sclera: Conjunctivae normal.      Pupils: Pupils are equal, round, and reactive to light. Neck:      Musculoskeletal: Normal range of motion and neck supple. Thyroid: No thyromegaly. Vascular: No JVD. Trachea: No tracheal deviation. Cardiovascular:      Rate and Rhythm: Normal rate and regular rhythm. Heart sounds: No murmur. No friction rub. Pulmonary:      Effort: Pulmonary effort is normal.      Breath sounds: Normal breath sounds. No stridor. Chest:      Chest wall: No tenderness. Abdominal:      General: Bowel sounds are normal. There is no distension. Palpations: Abdomen is soft. Tenderness: There is no abdominal tenderness. There is no guarding or rebound. Musculoskeletal: Normal range of motion. General: No tenderness.       Comments: Full range of motion no focal neurological deficits gait is normal  Patient does have lower thoracic upper lumbar spine tenderness no paraspinal tenderness appreciated patient is no SI joint tenderness  Patient has no sensorimotor deficits  There is no acute bruising or ecchymosis or step-off that could be appreciated some midline to left posterior axillary line surgical scar that is well-healed and that is where patient states the trauma was. Lymphadenopathy:      Cervical: No cervical adenopathy. Skin:     General: Skin is warm and dry. Neurological:      General: No focal deficit present. Mental Status: She is alert. Cranial Nerves: No cranial nerve deficit. Coordination: Coordination normal.   Psychiatric:         Mood and Affect: Mood normal.         Behavior: Behavior normal.         Thought Content: Thought content normal.         Judgment: Judgment normal.         Medical Decision Making   I am the first provider for this patient. I reviewed the vital signs, available nursing notes, past medical history, past surgical history, family history and social history. Provider Notes (Medical Decision Making): Patient presents emergency department with acute on chronic back pain  No focal deficits  Symptomatic relief be provided  Not imaging the patient has is a chronic pain  Patient is here because she cannot get into her primary care physician I will bridge her medications until she can get follow-up. There is no focal deficits no incontinence  Gait is normal  No acute trauma        Vital Signs-Reviewed the patient's vital signs. Pulse Oximetry Analysis -98%, room air, normal           Vitals:    07/25/20 0727   BP: 122/83   Pulse: 95   Resp: 17   Temp: 97 °F (36.1 °C)   SpO2: 98%   Weight: 72.6 kg (160 lb)   Height: 4' 10\" (1.473 m)       Records Reviewed: Nursing Notes    ED Course:        Discharge Note:  8:33 AM  The pt is ready for discharge. The pt's signs, symptoms, diagnosis, and discharge instructions have been discussed and pt has conveyed their understanding.  The pt is to follow up as recommended or return to ER should their symptoms worsen. Plan has been discussed and pt is in agreement. Diagnostic Study Results     Orders Placed This Encounter    URINALYSIS W/ RFLX MICROSCOPIC     Standing Status:   Standing     Number of Occurrences:   1    lidocaine 4 % patch 1 Patch    acetaminophen (TYLENOL) tablet 1,000 mg    cyclobenzaprine (FLEXERIL) tablet 5 mg    ibuprofen (MOTRIN) tablet 600 mg    predniSONE (DELTASONE) tablet 60 mg    gabapentin (NEURONTIN) capsule 300 mg    gabapentin (NEURONTIN) 800 mg tablet     Sig: Take 1 Tab by mouth three (3) times daily for 5 days. Max Daily Amount: 2,400 mg. Dispense:  15 Tab     Refill:  0    lidocaine (LIDODERM) 5 %     Sig: Apply patch to the affected area for 12 hours a day and remove for 12 hours a day. Dispense:  30 Each     Refill:  0    cyclobenzaprine (FLEXERIL) 5 mg tablet     Sig: Take 1 Tab by mouth three (3) times daily as needed for Muscle Spasm(s). Dispense:  12 Tab     Refill:  0    methylPREDNISolone (Medrol, Raphael,) 4 mg tablet     Sig: Use as prescribed and written by the      Dispense:  1 Dose Pack     Refill:  0       Labs -   No results found for this or any previous visit (from the past 12 hour(s)). Radiologic Studies -   No orders to display     CT Results  (Last 48 hours)    None        CXR Results  (Last 48 hours)    None              Discharge     Clinical Impression:   1. Chronic midline low back pain with bilateral sciatica    2. Paresthesia of both feet    3.  History of trauma to spine        Disposition:  Home    It should be noted that I will be the provider of record for this patient  Leticia Patterson MD      Follow-up Information     Follow up With Specialties Details Why [facility] EMERGENCY DEPT Emergency Medicine  If symptoms worsen [address] [place] [address]    Socorro Jacob MD Physical Medicine and Rehabilitation Call in 1 day Follow Up From Emergency [address]  [phone]            Current Discharge Medication List      START taking these medications    Details   lidocaine (LIDODERM) 5 % Apply patch to the affected area for 12 hours a day and remove for 12 hours a day. Qty: 30 Each, Refills: 0      cyclobenzaprine (FLEXERIL) 5 mg tablet Take 1 Tab by mouth three (3) times daily as needed for Muscle Spasm(s). Qty: 12 Tab, Refills: 0      methylPREDNISolone (Medrol, Raphael,) 4 mg tablet Use as prescribed and written by the   Qty: 1 Dose Pack, Refills: 0         CONTINUE these medications which have CHANGED    Details   gabapentin (NEURONTIN) 800 mg tablet Take 1 Tab by mouth three (3) times daily for 5 days. Max Daily Amount: 2,400 mg. Qty: 15 Tab, Refills: 0    Associated Diagnoses: Chronic midline low back pain with bilateral sciatica; Paresthesia of both feet; History of trauma to spine         CONTINUE these medications which have NOT CHANGED    Details   morphine CR (MS CONTIN) 60 mg CR tablet Take 60 mg by mouth three (3) times daily. morphine IR (MS IR) 30 mg tablet Take 30 mg by mouth three (3) times daily as needed.

## 2020-07-25 NOTE — DISCHARGE INSTRUCTIONS
Patient Education        Kaushikenejje Albino del dolor de espalda - [ [place] About Relief for Back Pain ]  ¿Qué son la tensión y la distensión en la espalda? La distensión en la espalda ocurre cuando usted estira [place], o fuerza, un músculo de la espalda. Usted puede lesionarse la espalda en un accidente o cuando hace ejercicio o levanta algo. La mayoría de los rashad de espalda mejoran con reposo y con el tiempo. Usted puede cuidarse en el hogar para ayudar a que chun espalda sane. ¿Qué es lo maxwell que puede hacer para aliviar el dolor de espalda? Cuando empiece a sentir dolor de espalda, siga estos pasos:  · Camine. Dé un paseo corto (10 a 20 minutos) sobre jennyfer superficie plana (sin pendientes, donde no haya colinas o escaleras) cada 2 o 3 horas. Solo camine distancias que pueda manejar sin dolor, especialmente dolor en las piernas. · Relájese. Encuentre jennyfer posición cómoda para descansar. Algunas personas se sienten cómodas en el suelo o en jennyfer cama de firmeza media con jennyfer almohada pequeña debajo de la carlos y otra debajo de las rodillas. Otras prefieren acostarse de lado con jennyfer almohada entre las rodillas. No permanezca en jennyfer posición por [employer]. · Pruebe con calor o hielo. Trate de [employer] almohadilla térmica a baja o media temperatura, o tome jennyfer ducha tibia de 15 o 20 minutos cada 2 o 3 horas. O puede comprar vendas térmicas desechables que se usan jennyfer salazar vez por hasta 8 horas. También puede probar compresas de hielo entre 10 y 15 minutos cada 2 o 3 horas. Puede usar jennyfer compresa de hielo o jennyfer bolsa de verduras congeladas envuelta en jennyfer toalla delgada. No existe evidencia sólida de que el calor o el hielo ayuden, dakota puede probarlos para pricila si son de [facility]. También podría convenirle probar alternar [employer] frío y el calor. · [employer] analgésicos (medicamentos para el dolor) exactamente según las indicaciones.   ¨ Si el médico le recetó un analgésico, tómelo según las indicaciones. ¨ Si no está tomando un analgésico recetado, pregúntele a chun médico si puede carmelina kiley de [employer]. ¿Qué más puede hacer? · Terrea Ou estiramientos y ejercicio. Los ejercicios que incrementan la flexibilidad pueden aliviar el dolor y facilitar que los músculos mantengan a la columna vertebral en jennyfer buena posición neutra. Y no se olvide de seguir caminando. · Hágase masajes usted mismo. Usted puede darse masaje para relajarse después del trabajo o de la escuela o para sentirse lleno de energía en la mañana. No es difícil [employer], las mt o el medardo. El darse masaje usted mismo funciona mejor si está con ropa cómoda y sentado o [place] en jennyfer posición cómoda. Utilice aceite o loción para masajearse la piel directamente. · Reduzca el estrés. El dolor de espalda puede llevar a un círculo paulette: La angustia por el dolor tensa los músculos en la espalda, lo que a chun vez causa más dolor. Aprenda a relajar la mente y los músculos para disminuir el estrés. ¿Dónde puede encontrar más información en inglés? Valentino Fraise a http://hayley-derrick.info/. [place]camilon Melvin J642 en la búsqueda para aprender más acerca de \"Aprenda sobre el [place] del dolor de espalda - [ Learning About Relief for Back Pain ]. \"  Revisado: 21 marzo, 2017  Versión del contenido: 11.5  © 1072-8018 Healthwise, Incorporated. Las instrucciones de cuidado fueron adaptadas bajo licencia por Good Help Connections (which disclaims liability or warranty for this information). Si usted tiene [place] afección médica o sobre estas instrucciones, siempre pregunte a chun profesional de apollo. [facility], Incorporated niega toda garantía o responsabilidad por chun uso de esta información. Patient Education        Dolor de espalda: Instrucciones de cuidado  Back Pain: Care Instructions  Instrucciones de cuidado    El dolor de espalda tiene muchas causas posibles.  Con frecuencia, está relacionado con problemas en los músculos y ligamentos de la espalda. También podría estar relacionado con problemas de los nervios, discos o huesos de la espalda. Moverse, levantar algo, ponerse de pie, sentarse o dormir de [employer] incómoda pueden forzar la espalda. Algunas veces no se da cuenta de que tiene jennyfer lesión [employer] tarde. La artritis es otra causa común del dolor de espalda. Aunque pedro vez duela mucho, el dolor de espalda por lo general mejora por sí mismo en varias semanas. [address] personas se recuperan en 12 semanas o menos. Hacer un buen tratamiento en el hogar y tener cuidado de no forzar la espalda puede ayudar a que se sienta mejor más pronto. La atención de seguimiento es jennyfer parte clave de chun tratamiento y seguridad. Asegúrese de hacer y acudir a todas las citas, y llame a chun médico si está teniendo problemas. También es jennyfer buena idea saber los resultados de dania exámenes y mantener jennyfer lista de los medicamentos que amie. ¿Cómo puede cuidarse en el hogar? · Siéntese o acuéstese en las posiciones más cómodas y que reduzcan el dolor. Trate jennyfer de estas posiciones al [place]:  ? Acuéstese boca arriba con las rodillas dobladas y apoyadas sobre [address] [place]. ? Acuéstese en el piso con las piernas sobre el asiento de un sofá o jennyfer silla. ? Acuéstese de lado con las rodillas y caderas dobladas y [place] almohada entre las piernas. ? Acuéstese boca abajo siempre que esta posición no empeore el dolor. · No se siente en la cama y evite los sofás blandos y las posiciones torcidas. El reposo en cama puede aliviar el dolor al principio, dakota retrasa la sanación. Evite reposar en la cama después del primer día de dolor de espalda. · Cambie de posición cada 30 minutos. Si debe sentarse por [employer], párese y descanse de estar sentado. Levántese y camine, o acuéstese en jennyfer posición cómoda. · Pruebe usar jennyfer almohadilla térmica a temperatura baja o mediana ivette 15 a 20 minutos cada 2 ó 3 horas.  [place] ducha tibia en vez de jennyfer sesión con la almohadilla térmica. · También puede probar jennyfer compresa de hielo ivette 10 a 15 minutos cada 2 a 3 horas. Póngase un paño dye entre la compresa de hielo y la piel. · [employer] analgésicos (medicamentos para el dolor) exactamente según las indicaciones. ? Si el médico le recetó un analgésico, tómelo según las indicaciones. ? Si no está tomando un analgésico recetado, pregúntele a chun médico si puede carmelina kiley de [employer]. · Kennedy caminatas cortas varias veces al día. Usted puede comenzar con 5 a 10 minutos, 3 ó 4 veces al día, y aumentar progresivamente hasta lograr caminatas más largas. Camine en superficies ermelinda y evite colinas y escaleras hasta que la espalda esté mejor. · Regrese al Katheran Snellen y otras actividades lo más pronto posible. El descanso continuo sin actividad por lo general no es aguilar para la espalda. · Para prevenir el dolor de espalda en el futuro, kennedy ejercicios para estirar y fortalecer la espalda y el abdomen. Aprenda a [employer], técnicas seguras para levantar peso y la mecánica corporal apropiada. ¿Cuándo debe pedir ayuda? Llame a chun médico ahora mismo o busque atención médica inmediata si:  · Tiene un entumecimiento nuevo o peor en las piernas. · Tiene debilidad nueva o peor en las piernas. ([place] puede hacer que le resulte difícil ponerse de pie). · Pierde el control de la vejiga o los intestinos. Preste especial atención a los cambios en chun apollo y asegúrese de comunicarse con chun médico si:  · Tiene fiebre, pierde peso o no se siente ray. · No mejora emma se esperaba. ¿Dónde puede encontrar más información en inglés? Vaya a http://hayley-derrick.info/  [place] T895 en la búsqueda para aprender más acerca de \"Dolor de espalda: Instrucciones de cuidado. \"  Revisado: 2 marzo, 2020               Versión del contenido: 12.5  © 9605-3166 Healthwise, Incorporated.    Las instrucciones de cuidado fueron adaptadas bajo licencia por Good Help Connections (which disclaims liability or warranty for this information). Si usted tiene [place] afección médica o sobre estas instrucciones, siempre pregunte a chun profesional de apollo. [facility], Incorporated niega toda garantía o responsabilidad por chun uso de esta información.

## 2020-07-25 NOTE — ED NOTES
Pt discharge pt home with a prescriptions for percocet. Pt agreed to be discharged. Pt AAOX4. Ambulates out of ER with  A steady upright gait. I have reviewed discharge instructions with the patient. The patient verbalized understanding. Discharge medications reviewed with patient and appropriate educational materials and side effects teaching were provided.

## 2020-07-25 NOTE — ED NOTES
Pt agitated and complaining  of more back pain and states she normally takes morphine 60 mg an 800 mg Neurontin.  This was discussed with Dr. Neymar Durbin
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
## 2020-07-26 NOTE — DISCHARGE INSTRUCTIONS
SPECIFIC PATIENT INSTRUCTIONS FROM THE PHYSICIAN WHO TREATED YOU IN THE ER TODAY:  1. Previously prescribed prednisone and flexeril as prescribed until finished. 2. Return if any concerns or worsening condition(s). 3. Follow up with:  Your primary doctor if still having symptoms after you finish the prednisone and flexeril. Patient Education        Dolor de espalda: Instrucciones de cuidado  Back Pain: Care Instructions  Instrucciones de cuidado    El dolor de espalda tiene muchas causas posibles. Con frecuencia, está relacionado con problemas en los músculos y ligamentos de la espalda. También podría estar relacionado con problemas de los nervios, discos o huesos de la espalda. Moverse, levantar algo, ponerse de pie, sentarse o dormir de [employer] incómoda pueden forzar la espalda. Algunas veces no se da cuenta de que tiene jennyfer lesión [employer] tarde. La artritis es otra causa común del dolor de espalda. Aunque pedro vez duela mucho, el dolor de espalda por lo general mejora por sí mismo en varias semanas. [address] personas se recuperan en 12 semanas o menos. Hacer un buen tratamiento en el hogar y tener cuidado de no forzar la espalda puede ayudar a que se sienta mejor más pronto. La atención de seguimiento es jennyfer parte clave de chun tratamiento y seguridad. Asegúrese de hacer y acudir a todas las citas, y llame a chun médico si está teniendo problemas. También es jennyfer buena idea saber los resultados de dania exámenes y mantener jennyfer lista de los medicamentos que amie. ¿Cómo puede cuidarse en el hogar? · Siéntese o acuéstese en las posiciones más cómodas y que reduzcan el dolor. Trate jennyfer de estas posiciones al Leticia Stai:  ? Acuéstese boca arriba con las rodillas dobladas y apoyadas sobre [address]. ? Acuéstese en el piso con las piernas sobre el asiento de un sofá o jennyfer silla. ? Acuéstese de lado con las rodillas y caderas dobladas y [employer] almohada entre las piernas. ?  Acuéstese boca abajo siempre que esta posición no empeore el dolor. · No se siente en la cama y evite los sofás blandos y las posiciones torcidas. El reposo en cama puede aliviar el dolor al principio, dakota retrasa la sanación. Evite reposar en la cama después del primer día de dolor de espalda. · Cambie de posición cada 30 minutos. Si debe sentarse por [employer], párese y descanse de estar sentado. Levántese y camine, o acuéstese en jennyfer posición cómoda. · Pruebe usar jennyfer almohadilla térmica a temperatura baja o mediana ivette 15 a 20 minutos cada 2 ó 3 horas. Pruebe jennyfer ducha tibia en vez de jennyfer sesión con la almohadilla térmica. · También puede probar jennyfer compresa de hielo ivette 10 a 15 minutos cada 2 a 3 horas. Póngase un paño dye entre la compresa de hielo y la piel. · [employer] analgésicos (medicamentos para el dolor) exactamente según las indicaciones. ? Si el médico le recetó un analgésico, tómelo según las indicaciones. ? Si no está tomando un analgésico recetado, pregúntele a chun médico si puede carmelina kiley de [employer]. · Kennedy caminatas cortas varias veces al día. Usted puede comenzar con 5 a 10 minutos, 3 ó 4 veces al día, y aumentar progresivamente hasta lograr caminatas más largas. Camine en superficies ermelinda y evite colinas y escaleras hasta que la espalda esté mejor. · Regrese al [place] Fan y otras actividades lo más pronto posible. El descanso continuo sin actividad por lo general no es aguilar para la espalda. · Para prevenir el dolor de espalda en el futuro, kennedy ejercicios para estirar y fortalecer la espalda y el abdomen. Aprenda a Time Aleman, técnicas seguras para levantar peso y la mecánica corporal apropiada. ¿Cuándo debe pedir ayuda? Llame a chun médico ahora mismo o busque atención médica inmediata si:  · Tiene un entumecimiento nuevo o peor en las piernas. · Tiene debilidad nueva o peor en las piernas. ([place] puede hacer que le resulte difícil ponerse de pie).   · Pierde el control de la vejiga o los intestinos. Preste especial atención a los cambios en chun apollo y asegúrese de comunicarse con chun médico si:  · Tiene fiebre, pierde peso o no se siente ray. · No mejora emma se esperaba. ¿Dónde puede encontrar más información en inglés? Vaya a http://hayley-derrick.info/  [place] U430 en la búsqueda para aprender más acerca de \"Dolor de espalda: Instrucciones de cuidado. \"  Revisado: 2 marzo, 2020               Versión del contenido: 12.5  © 4310-9233 Healthwise, Incorporated. Las instrucciones de cuidado fueron adaptadas bajo licencia por Good Help Connections (which disclaims liability or warranty for this information). Si usted tiene [place] afección médica o sobre estas instrucciones, siempre pregunte a chun profesional de apollo. Healthwise, Incorporated niega toda garantía o responsabilidad por chun uso de esta información. Patient Education        Dolor crónico: Instrucciones de cuidado  Chronic Pain: Care Instructions  Instrucciones de cuidado    El dolor crónico es un dolor que dura mucho tiempo (meses o incluso años) y podría tener o no tener jennyfer causa bertha. Es distinto del dolor polina, que suele tener jennyfer causa bertha, emma jennyfer lesión o jennyfer enfermedad, y mejora con el tiempo. El dolor crónico:  · Es de duración prolongada, dakota puede ser distinto cada día. · No desaparece a pesar de los esfuerzos para eliminarlo. · Puede interrumpir el sueño y causar fatiga. · Puede causar depresión o ansiedad. · Puede causar tensión muscular y provocar más dolor. · Puede perturbar chun trabajo, dania pasatiempos, chun mahad doméstica y dania relaciones con familiares y amigos. El dolor crónico es jennyfer afección médica muy real. No está sólo en chun carlos. El tratamiento puede ayudarle y normalmente incluye métodos utilizados en conjunto, emma medicamentos, fisioterapia, ejercicio y otros tratamientos.  Aprender a relajarse y a cambiar las tendencias de pensamientos negativos también pueden ayudarle a sobrellevarlo. El dolor crónico es complejo. Tener jennyfer participación Jeff Beth en chun propio tratamiento le ayudará a manejar mejor el dolor. Infórmele a chun médico si tiene problemas para controlar chun dolor. Es posible que tenga que intentar varias cosas antes de encontrar lo que funcione mejor para usted. La atención de seguimiento es jennyfer parte clave de chun tratamiento y seguridad. Asegúrese de hacer y acudir a todas las citas, y llame a chun médico si está teniendo problemas. También es jennyfer buena idea saber los resultados de liss exámenes y mantener jennyfer lista de los medicamentos que amie. ¿Cómo puede cuidarse en el hogar? · Siga chun propio ritmo. Divida las tareas grandes en tareas más pequeñas. Deje las tareas más difíciles para los días en que tenga menos dolor o alterne las tareas difíciles con otras más fáciles. Tómese descansos. · Relájese y reduzca el estrés. Las técnicas de relajación, emma la respiración profunda o la meditación, pueden ayudar. · Manténgase en movimiento. El ejercicio suave a diario puede con el tiempo ayudar a reducir el dolor. Pruebe con ejercicios de bajo o ningún impacto, emma caminar, nadar y usar jennyfer bicicleta estática. Tanya estiramientos para mantenerse flexible. · Pruebe con calor, compresas frías y masajes. · Duerma lo suficiente. El dolor crónico puede hacerle sentir cansado y agotar chun energía. Hable con chun médico si le liam dormir debido al Monique Mccormick. · Piense de manera positiva. Liss pensamientos pueden afectar el nivel de dolor. Tanya cosas que disfrute para distraerse cuando sienta dolor en lugar de concentrarse en él. Primitivo jennyfer película, brianne un libro, escuche música o pase tiempo con un amigo. · Si le parece que está deprimido, hable con chun médico acerca del tratamiento. · Mantenga un registro diario de chun dolor. Registre de [place] liss estados de ánimo, pensamientos, patrones de [place] y medicamentos afectan el dolor.  Puede descubrir que el dolor empeora ivette o después de ciertas actividades o cuando siente jennyfer emoción determinada. Llevar un registro de chun dolor les puede ayudar a usted y chun médico a encontrar las mejores maneras de tratar chun dolor. · [employer] analgésicos (medicamentos para el dolor) exactamente según las indicaciones. ? Si el médico le recetó un analgésico, tómelo según las indicaciones. ? Si no está tomando un analgésico recetado, pregúntele a chun médico si puede carmelina un medicamento de [employer]. Cómo reducir el estreñimiento causado por los analgésicos  · Incluya en chun alimentación diaria frutas, vegetales, frijoles (habichuelas) y granos integrales. Estos alimentos son ricos en fibra. · Diane abundantes líquidos, suficientes para que chun orina sea de color amarillo antonio o transparente emma el agua. Si tiene [facility], el corazón o el hígado y tiene que Jay's líquidos, hable con chun médico antes de aumentar chun consumo. · Si chun médico lo recomienda, kennedy más ejercicio. Caminar es jennyfer buena opción. Poco a poco, aumente la distancia que [employer]. Trate de hacer al menos 30 minutos la mayoría de los días de la [place]. · Programe tiempo todos los días para evacuar el intestino. Madeline Sa rutina diaria podría ayudar. Tómese chun tiempo y no se esfuerce cuando esté evacuando. ¿Cuándo debe pedir ayuda? Llame a chun médico ahora mismo o busque atención médica inmediata si:  · El dolor empeora o está fuera de control. · Se siente aneta o deprimido, o no disfruta de las cosas que solía [place]. Puede estar deprimido, lo que es común [employer] personas que tienen dolor crónico. La depresión se puede tratar. · Tiene vómito o retortijones por más de 2 horas. Preste especial atención a los cambios en chun apollo y asegúrese de comunicarse con chun médico si:  · No puede dormir por causa del dolor. · Se siente muy preocupado o ansioso respecto a chun dolor.   · Tiene problemas para carmelina dania analgésicos. · Tiene inquietudes con respecto al analgésico.  · Tiene problemas con la evacuación intestinal, tales emma:  ? No ha evacuado en 3 días. ? Hay endy en la bishop anal, en las heces o en el papel higiénico.  ? Tiene diarrea por más de 24 horas. ¿Dónde puede encontrar más información en inglés? Vaya a http://hayley-derrick.info/  [place] Y818 en la búsqueda para aprender más acerca de \"Dolor crónico: Instrucciones de cuidado. \"  Revisado: , 7               [place] [facility] del contenido: 12.5  © 1756-1718 Healthwise, Incorporated. Las instrucciones de cuidado fueron adaptadas bajo licencia por Good Help Connections (which disclaims liability or warranty for this information). Si usted tiene [place] afección médica o sobre estas instrucciones, siempre pregunte a chun profesional de apollo. Healthwise, Incorporated niega toda garantía o responsabilidad por chun uso de esta información. [facility] Activation    Thank you for requesting access to [facility]. Please follow the instructions below to securely access and download your online medical record. [facility] allows you to send messages to your doctor, view your test results, renew your prescriptions, schedule appointments, and more. How Do I Sign Up? In your internet browser, go to [email or web address]. Click on the First Time User? Click Here link in the Sign In box. You will see the New Member Sign Up page. Enter your [facility] Access Code exactly as it appears below. You will not need to use this code after you´ve completed the sign-up process. If you do not sign up before the expiration date, you must request a new code. [facility] Access Code: [email or web address]  Expires: 3/28/2019  2:27 PM (This is the date your [facility] access code will )    Enter the last four digits of your Social Security Number (xxxx) and Date of Birth (mm/dd/yyyy) as indicated and click Submit.  You will be taken to the next sign-up page. Create a StatsMix ID. This will be your StatsMix login ID and cannot be changed, so think of one that is secure and easy to remember. Create a [employer]Mix password. You can change your password at any time. Enter your Password Reset Question and Answer. This can be used at a later time if you forget your password. Enter your e-mail address. You will receive e-mail notification when new information is available in [address] Click Sign Up. You can now view and download portions of your medical record. Click the [facility] link to download a portable copy of your medical information. Additional Information    If you have questions, please visit the Frequently Asked Questions section of the StatsMix website at [email or web address] Remember, [employer] is NOT to be used for urgent needs. For medical emergencies, dial 911.
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
## 2020-07-26 NOTE — ED NOTES
10:02 PM  07/25/20     Discharge instructions given to pt (name) with verbalization of understanding. Patient accompanied by family. Patient discharged with the following prescriptions flexeril. Patient discharged to home (destination).       Pilar Romero RN

## 2020-08-27 ENCOUNTER — HOSPITAL ENCOUNTER (EMERGENCY)
Age: 60
Discharge: HOME | End: 2020-08-27
Attending: EMERGENCY MEDICINE

## 2020-08-27 VITALS
TEMPERATURE: 98.1 F | HEIGHT: 62 IN | OXYGEN SATURATION: 99 % | HEART RATE: 98 BPM | WEIGHT: 160 LBS | SYSTOLIC BLOOD PRESSURE: 156 MMHG | BODY MASS INDEX: 29.44 KG/M2 | DIASTOLIC BLOOD PRESSURE: 70 MMHG | RESPIRATION RATE: 16 BRPM

## 2020-08-27 DIAGNOSIS — M54.50 CHRONIC BILATERAL LOW BACK PAIN WITHOUT SCIATICA: Primary | ICD-10-CM

## 2020-08-27 DIAGNOSIS — G89.29 CHRONIC BILATERAL LOW BACK PAIN WITHOUT SCIATICA: Primary | ICD-10-CM

## 2020-08-27 PROCEDURE — 99283 EMERGENCY DEPT VISIT LOW MDM: CPT

## 2020-08-27 PROCEDURE — 74011250637 HC RX REV CODE- 250/637: Performed by: EMERGENCY MEDICINE

## 2020-08-27 RX ORDER — MORPHINE SULFATE 60 MG/1
60 TABLET, FILM COATED, EXTENDED RELEASE ORAL ONCE
Qty: 1 TAB | Refills: 0 | Status: SHIPPED | OUTPATIENT
Start: 2020-08-27 | End: 2020-08-27

## 2020-08-27 RX ORDER — MORPHINE SULFATE 15 MG/1
60 TABLET, FILM COATED, EXTENDED RELEASE ORAL
Status: COMPLETED | OUTPATIENT
Start: 2020-08-27 | End: 2020-08-27

## 2020-08-27 RX ADMIN — MORPHINE SULFATE 60 MG: 15 TABLET, FILM COATED, EXTENDED RELEASE ORAL at 20:38

## 2020-08-27 NOTE — ED TRIAGE NOTES
Patient complaining of back pain- patient seen at [facility] yesterday for same states that does not have enough pain medication - oxycodone, that she  has an appointment tomorrow  To get refill for her MS contin

## 2020-08-28 NOTE — ED PROVIDER NOTES
Beatriz Umanzor is a 61 y.o. female with history of chronic low back pain with complaints of worsening pain in her lower back and is unable to get see her doctor until tomorrow afternoon. Patient is out of her pain medication. She has no fever, chills, sweats, new numbness or tingling, difficulty with bowel or bladder function. No fall or new trauma. She denies any abdominal pain chest pain or shortness of breath. Pain is worse with movement. The history is provided by the patient. A  was used. Past Medical History:   Diagnosis Date    Chronic obstructive pulmonary disease ([facility] [address]     Hypertension     Other ill-defined conditions(799.89)     chronic pain    Trauma        Past Surgical History:   Procedure Laterality Date    HX ORTHOPAEDIC      repair fracture back         History reviewed. No pertinent family history.     Social History     Socioeconomic History    Marital status: SINGLE     Spouse name: Not on file    Number of children: Not on file    Years of education: Not on file    Highest education level: Not on file   Occupational History    Not on file   Social Needs    Financial resource strain: Not on file    Food insecurity     Worry: Not on file     Inability: Not on file    Transportation needs     Medical: Not on file     Non-medical: Not on file   Tobacco Use    Smoking status: Never Smoker    Smokeless tobacco: Never Used   Substance and Sexual Activity    Alcohol use: No    Drug use: No    Sexual activity: Not on file   Lifestyle    Physical activity     Days per week: Not on file     Minutes per session: Not on file    Stress: Not on file   Relationships    Social connections     Talks on phone: Not on file     Gets together: Not on file     Attends Jainism service: Not on file     Active member of club or organization: Not on file     Attends meetings of clubs or organizations: Not on file     Relationship status: Not on file    Intimate partner violence     Fear of current or ex partner: Not on file     Emotionally abused: Not on file     Physically abused: Not on file     Forced sexual activity: Not on file   Other Topics Concern    Not on file   Social History Narrative    Not on file         ALLERGIES: Penicillins    Review of Systems   Constitutional: Negative for fever. HENT: Negative for sore throat. Eyes: Negative for visual disturbance. Respiratory: Negative for shortness of breath. Cardiovascular: Negative for chest pain. Gastrointestinal: Negative for abdominal pain. Genitourinary: Negative for difficulty urinating. Musculoskeletal: Positive for back pain. Negative for gait problem. Skin: Negative for rash. Neurological: Negative for syncope. Psychiatric/Behavioral: Positive for sleep disturbance. Vitals:    08/27/20 1943   BP: 166/86   Pulse: 97   Resp: 17   Temp: 98.5 °F (36.9 °C)   SpO2: 97%   Weight: 72.6 kg (160 lb)   Height: 5' 2\" (1.575 m)            Physical Exam  Vitals signs and nursing note reviewed. Constitutional:       General: She is in acute distress. Appearance: She is well-developed. She is obese. She is not ill-appearing, toxic-appearing or diaphoretic. HENT:      Head: Normocephalic and atraumatic. Right Ear: External ear normal.      Left Ear: External ear normal.      Nose: Nose normal.      Mouth/Throat:      Pharynx: Uvula midline. Eyes:      General: No scleral icterus. Conjunctiva/sclera: Conjunctivae normal.   Neck:      Musculoskeletal: Neck supple. Cardiovascular:      Rate and Rhythm: Normal rate and regular rhythm. Heart sounds: Normal heart sounds. Pulmonary:      Effort: Pulmonary effort is normal.      Breath sounds: Normal breath sounds. Abdominal:      Palpations: Abdomen is soft. Tenderness: There is no abdominal tenderness. Musculoskeletal:      Comments: Diffuse lower back tenderness with spasm.   Normal gait and normal lower extremity strength and sensation to light touch   Skin:     General: Skin is warm and dry. Capillary Refill: Capillary refill takes less than 2 seconds. Neurological:      Mental Status: She is alert and oriented to person, place, and time. Gait: Gait normal.   Psychiatric:         Behavior: Behavior normal.          MDM       Procedures  Vitals:  Patient Vitals for the past 12 hrs:   Temp Pulse Resp BP SpO2   08/27/20 1943 98.5 °F (36.9 °C) 97 17 166/86 97 %         Medications ordered:   Medications   morphine CR (MS CONTIN) tablet 60 mg (has no administration in time range)         Lab findings:  No results found for this or any previous visit (from the past 12 hour(s)). EKG interpretation by ED Physician:      X-Ray, CT or other radiology findings or impressions:  No orders to display       Progress notes, Consult notes or additional Procedure notes: Patient with chronic low back pain. No signs of any cauda equina or other concerning symptoms at this time. Will give dose of pain medication here along with 1 pill for tomorrow morning until she is able to see her doctor in the afternoon    I have discussed with patient and/or family/sig other the results, interpretation of any imaging if performed, suspected diagnosis and treatment plan to include instructions regarding the diagnoses listed to which understanding was expressed with all questions answered    Reevaluation of patient:   stable    Disposition:  Diagnosis:   1.  Chronic bilateral low back pain without sciatica        Disposition: home    Follow-up Information     Follow up With Specialties Details Why Contact Info    Magdiel Sargent MD Physical Medicine and Rehabilitation Schedule an appointment as soon as possible for a visit  [address]  [phone]              Patient's Medications   Start Taking    No medications on file   Continue Taking    ADALIMUMAB (HUMIRA) 40 MG/0.8 ML INJECTION by SubCUTAneous route once. ALBUTEROL (PROVENTIL HFA, VENTOLIN HFA, PROAIR HFA) 90 MCG/ACTUATION INHALER    Take 2 Puffs by inhalation every four (4) hours as needed for Wheezing. AMLODIPINE-BENAZEPRIL (LOTREL) 5-10 MG PER CAPSULE    Take 1 Cap by mouth daily. FOLIC ACID (FOLVITE) 1 MG TABLET    Take  by mouth daily. These Medications have changed    Modified Medication Previous Medication    MORPHINE CR (MS CONTIN) 60 MG CR TABLET morphine CR (MS CONTIN) 60 mg CR tablet       Take 1 Tab by mouth once for 1 dose. Max Daily Amount: 60 mg. Take 60 mg by mouth three (3) times daily. Stop Taking    LIDOCAINE (LIDODERM) 5 %    Apply patch to the affected area for 12 hours a day and remove for 12 hours a day.

## 2020-08-28 NOTE — DISCHARGE INSTRUCTIONS
Patient Education        Dolor crónico: Instrucciones de cuidado  Chronic Pain: Care Instructions  Instrucciones de cuidado    El dolor crónico es un dolor que dura mucho tiempo (meses o incluso años) y podría tener o no tener jennyfer causa bertha. Es distinto del dolor polina, que suele tener jennyfer causa bertha, emma jennyfer lesión o jennyfer enfermedad, y mejora con el tiempo. El dolor crónico:  · Es de duración prolongada, dakota puede ser distinto cada día. · No desaparece a pesar de los esfuerzos para eliminarlo. · Puede interrumpir el sueño y causar fatiga. · Puede causar depresión o ansiedad. · Puede causar tensión muscular y provocar más dolor. · Puede perturbar chun trabajo, dania pasatiempos, chun mahad doméstica y dania relaciones con familiares y amigos. El dolor crónico es jennyfer afección médica muy real. No está sólo en chun carlos. El tratamiento puede ayudarle y normalmente incluye métodos utilizados en conjunto, emma medicamentos, fisioterapia, ejercicio y otros tratamientos. Emeka Bransford a relajarse y a [address] las tendencias de pensamientos negativos también pueden ayudarle a sobrellevarlo. El dolor crónico es complejo. Tener jennyfer participación Jeff Beth en chun propio tratamiento le ayudará a manejar mejor el dolor. Infórmele a chun médico si tiene problemas para controlar chun dolor. Es posible que tenga que intentar varias cosas antes de encontrar lo que funcione mejor para usted. La atención de seguimiento es jennyfer parte clave de chun tratamiento y seguridad. Asegúrese de hacer y acudir a todas las citas, y llame a chun médico si está teniendo problemas. También es jennyfer buena idea saber los resultados de dania exámenes y mantener jennyfer lista de los medicamentos que amie. ¿Cómo puede cuidarse en el hogar? · Siga chun propio ritmo. Divida las tareas grandes en tareas más pequeñas. Deje las tareas más difíciles para los días en que tenga menos dolor o alterne las tareas difíciles con otras más fáciles. Tómese descansos.   · Relájese y reduzca el estrés. Las técnicas de relajación, emma la respiración profunda o la meditación, pueden ayudar. · Manténgase en movimiento. El ejercicio suave a diario puede con el tiempo ayudar a reducir el dolor. Pruebe con ejercicios de bajo o ningún impacto, emma caminar, nadar y usar jennyfer bicicleta estática. Tanya estiramientos para mantenerse flexible. · Pruebe con calor, compresas frías y masajes. · Duerma lo suficiente. El dolor crónico puede hacerle sentir cansado y agotar chun energía. Hable con chun médico si le liam dormir debido al Monique Mccormick. · Piense de manera positiva. Dania pensamientos pueden afectar el nivel de dolor. Tanya cosas que disfrute para distraerse cuando sienta dolor en lugar de concentrarse en él. Primitivo jennyfer película, brianne un libro, escuche música o pase tiempo con un amigo. · Si le parece que está deprimido, hable con chun médico acerca del tratamiento. · Mantenga un registro diario de chun dolor. Registre de [place] dania estados de ánimo, pensamientos, patrones de [place] y medicamentos afectan el dolor. Puede descubrir que el dolor empeora ivette o después de ciertas actividades o cuando siente jennyfer emoción determinada. Llevar un registro de chun dolor les puede ayudar a usted y chun médico a encontrar las mejores maneras de tratar chun dolor. · [employer] analgésicos (medicamentos para el dolor) exactamente según las indicaciones. ? Si el médico le recetó un analgésico, tómelo según las indicaciones. ? Si no está tomando un analgésico recetado, pregúntele a chun médico si puede carmelina un medicamento de [employer]. Cómo reducir el estreñimiento causado por los analgésicos  · Incluya en chun alimentación diaria frutas, vegetales, frijoles (habichuelas) y granos integrales. Estos alimentos son ricos en fibra. · Diane abundantes líquidos, suficientes para que chun orina sea de color amarillo antonio o transparente emma el agua.  Si tiene [employer] [facility] [employer], el corazón o el hígado y tiene que Jay's líquidos, hable con chun médico antes de aumentar chun consumo. · Si chun médico lo recomienda, kennedy más ejercicio. Caminar es jennyfer buena opción. Poco a poco, aumente la distancia que [employer]. Trate de hacer al menos 30 minutos la mayoría de los días de la Holiday. · Programe tiempo todos los días para evacuar el intestino. Alyx Dyers rutina diaria podría ayudar. Tómese chun tiempo y no se esfuerce cuando esté evacuando. ¿Cuándo debe pedir ayuda? Llame a chun médico ahora mismo o busque atención médica inmediata si:  · El dolor empeora o está fuera de control. · Se siente aneta o deprimido, o no disfruta de las cosas que solía [place]. Puede estar deprimido, lo que es común [employer] personas que tienen dolor crónico. La depresión se puede tratar. · Tiene vómito o retortijones por más de 2 horas. Preste especial atención a los cambios en chun apollo y asegúrese de comunicarse con chun médico si:  · No puede dormir por causa del dolor. · Se siente muy preocupado o ansioso respecto a chun dolor. · Tiene problemas para carmelina dania analgésicos. · Tiene inquietudes con respecto al analgésico.  · Tiene problemas con la evacuación intestinal, tales emma:  ? No ha evacuado en 3 días. ? Hay endy en la bishop anal, en las heces o en el papel higiénico.  ? Tiene diarrea por más de 24 horas. ¿Dónde puede encontrar más información en inglés? Vaya a http://hayley-derrick.info/  Gaetano N484 en la búsqueda para aprender más acerca de \"Dolor crónico: Instrucciones de cuidado. \"  Revisado: 20 noviembre, 0875               QAQRSWL del contenido: 12.5  © 9893-6189 Healthwise, [employer]. Las instrucciones de cuidado fueron adaptadas bajo licencia por [employer] (which disclaims liability or warranty for this information). Si usted tiene [place] afección médica o sobre estas instrucciones, siempre pregunte a chun profesional de apollo.  [facility], [employer] niega toda garantía o responsabilidad por chun uso de esta información. Patient Education        Dolor de espalda: Instrucciones de cuidado  Back Pain: Care Instructions  Instrucciones de cuidado    El dolor de espalda tiene muchas causas posibles. Con frecuencia, está relacionado con problemas en los músculos y ligamentos de la espalda. También podría estar relacionado con problemas de los nervios, discos o huesos de la espalda. Moverse, levantar algo, ponerse de pie, sentarse o dormir de Marilynn incómoda pueden forzar la espalda. Algunas veces no se da cuenta de que tiene jennyfer lesión [employer] tarde. La artritis es otra causa común del dolor de espalda. Aunque pedro vez duela mucho, el dolor de espalda por lo general mejora por sí mismo en varias semanas. [address] personas se recuperan en 12 semanas o menos. Hacer un buen tratamiento en el hogar y tener cuidado de no forzar la espalda puede ayudar a que se sienta mejor más pronto. La atención de seguimiento es jennyfer parte clave de chun tratamiento y seguridad. Asegúrese de hacer y acudir a todas las citas, y llame a chun médico si está teniendo problemas. También es jennyfer buena idea saber los resultados de dania exámenes y mantener jennyfer lista de los medicamentos que amie. ¿Cómo puede cuidarse en el hogar? · Siéntese o acuéstese en las posiciones más cómodas y que reduzcan el dolor. Trate jennyfer de estas posiciones al Leticia Stai:  ? Acuéstese boca arriba con las rodillas dobladas y apoyadas sobre [address]. ? Acuéstese en el piso con las piernas sobre el asiento de un sofá o jennyfer silla. ? Acuéstese de lado con las rodillas y caderas dobladas y Rafaela Paddock almohada entre las piernas. ? Acuéstese boca abajo siempre que esta posición no empeore el dolor. · No se siente en la cama y evite los sofás blandos y las posiciones torcidas. El reposo en cama puede aliviar el dolor al principio, dakota retrasa la sanación.  Evite reposar en la cama después del primer día de dolor de espalda. · Cambie de posición cada 30 minutos. Si debe sentarse por [employer], párese y descanse de estar sentado. Levántese y camine, o acuéstese en jennyfer posición cómoda. · Pruebe usar jennyfer almohadilla térmica a temperatura baja o mediana ivette 15 a 20 minutos cada 2 ó 3 horas. Pruebe jennyfer ducha tibia en vez de jennyfer sesión con la almohadilla térmica. · También puede probar jennyfer compresa de hielo ivette 10 a 15 minutos cada 2 a 3 horas. Póngase un paño dye entre la compresa de hielo y la piel. · [employer] analgésicos (medicamentos para el dolor) exactamente según las indicaciones. ? Si el médico le recetó un analgésico, tómelo según las indicaciones. ? Si no está tomando un analgésico recetado, pregúntele a chun médico si puede carmelina kiley de [employer]. · Kennedy caminatas cortas varias veces al día. Usted puede comenzar con 5 a 10 minutos, 3 ó 4 veces al día, y aumentar progresivamente hasta lograr caminatas más largas. Camine en superficies ermelinda y evite colinas y escaleras hasta que la espalda esté mejor. · Regrese al Hakeem Bathe y otras actividades lo más pronto posible. El descanso continuo sin actividad por lo general no es aguilar para la espalda. · Para prevenir el dolor de espalda en el futuro, kennedy ejercicios para estirar y fortalecer la espalda y el abdomen. Aprenda a Time [place], técnicas seguras para levantar peso y la mecánica corporal apropiada. ¿Cuándo debe pedir ayuda? Llame a chun médico ahora mismo o busque atención médica inmediata si:  · Tiene un entumecimiento nuevo o peor en las piernas. · Tiene debilidad nueva o peor en las piernas. ([place] puede hacer que le resulte difícil ponerse de pie). · Pierde el control de la vejiga o los intestinos. Preste especial atención a los cambios en chun apollo y asegúrese de comunicarse con chun médico si:  · Tiene fiebre, pierde peso o no se siente ray. · No mejora emma se esperaba. ¿Dónde puede encontrar más información en inglés?   Saratha Ormond a http://hayley-derrick.info/  Gaetano H961 en la búsqueda para aprender más acerca de \"Dolor de espalda: Instrucciones de cuidado. \"  Revisado: 2 marzo, 2020               Versión del contenido: 12.5  © 2006-2020 Healthwise, Incorporated. Las instrucciones de cuidado fueron adaptadas bajo licencia por [employer] (which disclaims liability or warranty for this information). Si usted tiene [place] afección médica o sobre estas instrucciones, siempre pregunte a chun profesional de apollo. Healthwise, Incorporated niega toda garantía o responsabilidad por chun uso de esta información.

## 2020-08-28 NOTE — ED NOTES
9:35 PM  08/27/20     Discharge instructions given to pt and family (name) with verbalization of understanding. Patient accompanied by daughter. Patient discharged with the following prescriptions mscontin. Patient discharged to home (destination).       Stefany Boyd RN

## 2020-11-10 ENCOUNTER — HOSPITAL ENCOUNTER (EMERGENCY)
Age: 60
Discharge: HOME | End: 2020-11-10
Attending: EMERGENCY MEDICINE

## 2020-11-10 VITALS
WEIGHT: 160 LBS | HEIGHT: 62 IN | DIASTOLIC BLOOD PRESSURE: 69 MMHG | OXYGEN SATURATION: 96 % | SYSTOLIC BLOOD PRESSURE: 125 MMHG | BODY MASS INDEX: 29.44 KG/M2 | HEART RATE: 74 BPM | TEMPERATURE: 97.6 F | RESPIRATION RATE: 20 BRPM

## 2020-11-10 DIAGNOSIS — B37.9 CANDIDA INFECTION: ICD-10-CM

## 2020-11-10 DIAGNOSIS — N89.8 VAGINAL IRRITATION: Primary | ICD-10-CM

## 2020-11-10 LAB
APPEARANCE UR: CLEAR
BILIRUB UR QL: NEGATIVE
COLOR UR: YELLOW
GLUCOSE UR STRIP.AUTO-MCNC: NEGATIVE MG/DL
HGB UR QL STRIP: NEGATIVE
KETONES UR QL STRIP.AUTO: NEGATIVE MG/DL
LEUKOCYTE ESTERASE UR QL STRIP.AUTO: NEGATIVE
NITRITE UR QL STRIP.AUTO: NEGATIVE
PH UR STRIP: 8.5 [PH] (ref 5–8)
PROT UR STRIP-MCNC: NEGATIVE MG/DL
SERVICE CMNT-IMP: NORMAL
SP GR UR REFRACTOMETRY: 1.01 (ref 1–1.03)
UROBILINOGEN UR QL STRIP.AUTO: 0.2 EU/DL (ref 0.2–1)
WET PREP GENITAL: NORMAL

## 2020-11-10 PROCEDURE — 74011250637 HC RX REV CODE- 250/637: Performed by: PHYSICIAN ASSISTANT

## 2020-11-10 PROCEDURE — 87210 SMEAR WET MOUNT SALINE/INK: CPT

## 2020-11-10 PROCEDURE — 99282 EMERGENCY DEPT VISIT SF MDM: CPT

## 2020-11-10 PROCEDURE — 87491 CHLMYD TRACH DNA AMP PROBE: CPT

## 2020-11-10 PROCEDURE — 81003 URINALYSIS AUTO W/O SCOPE: CPT

## 2020-11-10 RX ORDER — FLUCONAZOLE 150 MG/1
150 TABLET ORAL
Status: COMPLETED | OUTPATIENT
Start: 2020-11-10 | End: 2020-11-10

## 2020-11-10 RX ORDER — DOXYLAMINE SUCCINATE 25 MG
TABLET ORAL 2 TIMES DAILY
Qty: 15 G | Refills: 0 | Status: SHIPPED | OUTPATIENT
Start: 2020-11-10

## 2020-11-10 RX ADMIN — FLUCONAZOLE 150 MG: 150 TABLET ORAL at 16:54

## 2020-11-10 NOTE — ED TRIAGE NOTES
Pt arrives with 3 weeks of vaginal itching. Pt states she was given a cream, has been using x 7 days with no improvement.

## 2020-11-10 NOTE — ED PROVIDER NOTES
[facility] EMERGENCY DEPT    Date: 11/10/2020  Patient Name: Harmony Lemus    History of Presenting Illness     Chief Complaint   Patient presents with    Vaginal Itching     61 y.o. female with a past medical history of hypertension and COPD presents the ED complaining of vaginal itching and dysuria for the past 3 weeks. Patient also reports having urinary frequency. States she recently used a cream that she is unsure of the name of, but that has not helped. She denies any fever, chills, back pain, abdominal pain, vaginal bleeding, other symptoms. Patient denies any other associated signs or symptoms. Patient denies any other complaints. Nursing notes regarding the HPI and triage nursing notes were reviewed. Prior medical records were reviewed. Current Facility-Administered Medications   Medication Dose Route Frequency Provider Last Rate Last Dose    fluconazole (DIFLUCAN) tablet 150 mg  150 mg Oral NOW LAYLA Chan         Current Outpatient Medications   Medication Sig Dispense Refill    miconazole (MICOTIN) 2 % topical cream Apply  to affected area two (2) times a day. 15 g 0    albuterol (PROVENTIL HFA, VENTOLIN HFA, PROAIR HFA) 90 mcg/actuation inhaler Take 2 Puffs by inhalation every four (4) hours as needed for Wheezing. 1 Inhaler 0    amLODIPine-benazepril (LOTREL) 5-10 mg per capsule Take 1 Cap by mouth daily.  folic acid (FOLVITE) 1 mg tablet Take  by mouth daily.  adalimumab (HUMIRA) 40 mg/0.8 mL injection by SubCUTAneous route once. Past History     Past Medical History:  Past Medical History:   Diagnosis Date    Chronic obstructive pulmonary disease ([address]     Hypertension     Other ill-defined conditions(799.89)     chronic pain    Trauma        Past Surgical History:  Past Surgical History:   Procedure Laterality Date    HX ORTHOPAEDIC      repair fracture back       Family History:  History reviewed. No pertinent family history.     Social History:  Social History     Tobacco Use    Smoking status: Never Smoker    Smokeless tobacco: Never Used   Substance Use Topics    Alcohol use: No    Drug use: No       Allergies: Allergies   Allergen Reactions    Penicillins Anaphylaxis       Patient's primary care provider (as noted in EPIC):  Charity Hernandez MD    Review of Systems   Constitutional:  Denies malaise, fever, chills. Respiratory:  Denies cough, wheezing, difficulty breathing, shortness of breath. GI/ABD:  Denies injury, pain, distention, nausea, vomiting, diarrhea. : + urinary frequency, dysuria, vaginal irritation. Back:  Denies injury or pain. Pelvis:  Denies injury or pain. Extremity/MS:  Denies injury or pain. Neuro:  Denies headache, LOC, dizziness, neurologic symptoms/deficits/paresthesias. Skin: Denies injury, rash, itching or skin changes. All other systems negative as reviewed. Visit Vitals  /69   Pulse 74   Temp 97.6 °F (36.4 °C)   Resp 20   Ht 5' 2\" (1.575 m)   Wt 72.6 kg (160 lb)   SpO2 96%   BMI 29.26 kg/m²       PHYSICAL EXAM:    CONSTITUTIONAL:  Alert, in no apparent distress;  well developed;  well nourished. HEAD:  Normocephalic, atraumatic. EYES:  EOMI. Non-icteric sclera. Normal conjunctiva. ENTM:  Mouth: mucous membranes moist.  NECK:  Supple  RESPIRATORY:  Chest clear, equal breath sounds, good air movement. Without wheezes, rhonchi or rales. CARDIOVASCULAR:  Regular rate and rhythm. No murmurs, rubs, or gallops. : Anterior external vagina with erythema/very superficial erosion and white thick discharge noted. NEURO:  Moves all four extremities, and grossly normal motor exam.  SKIN:  No rashes;  Normal for age. PSYCH:  Alert and normal affect. ED COURSE:  Urine or urethral specimen(s) may have been collected to check for gonorrhea and chlamydia.      Differential diagnoses/ medical decision making:   Vaginal discharge secondary to yeast infection, bacterial vaginosis, urinary tract infection, ovarian cyst rupture, other etiologies or combination of the above. Recent Results (from the past 12 hour(s))   URINALYSIS W/ RFLX MICROSCOPIC    Collection Time: 11/10/20  1:10 PM   Result Value Ref Range    Color YELLOW      Appearance CLEAR      Specific gravity 1.006 1.005 - 1.030      pH (UA) 8.5 (H) 5.0 - 8.0      Protein Negative NEG mg/dL    Glucose Negative NEG mg/dL    Ketone Negative NEG mg/dL    Bilirubin Negative NEG      Blood Negative NEG      Urobilinogen 0.2 0.2 - 1.0 EU/dL    Nitrites Negative NEG      Leukocyte Esterase Negative NEG     WET PREP    Collection Time: 11/10/20  1:10 PM    Specimen: Vagina   Result Value Ref Range    Special Requests: NO SPECIAL REQUESTS      Wet prep NO YEAST,TRICHOMONAS OR CLUE CELLS NOTED        Based on the patient's history of presenting illness, physical examination, laboratory, radiographic, and/or tests results, and response to medical interventions, I believe the patient most likely is having a candidiasis vaginalis. Pt has erythema/superficial erosion of anterior external vagina with thick white discharge present. Will treat with diflucan here, miconazole cream for home. Pt notes just using unknown cream at home, no suppository. Plan to f/u with PCP. Diagnosis:   1. Vaginal irritation    2. Candida infection      Disposition: Discharge    Follow-up Information     Follow up With Specialties Details Why Contact Info    Enoc Araujo MD Physical Medicine and Rehabilitation In 3 days  [address] [id number] [phone]      [facility] EMERGENCY DEPT Emergency Medicine  If symptoms worsen [address]  [phone]          Patient's Medications   Start Taking    MICONAZOLE (MICOTIN) 2 % TOPICAL CREAM    Apply  to affected area two (2) times a day. Continue Taking    ADALIMUMAB (HUMIRA) 40 MG/0.8 ML INJECTION    by SubCUTAneous route once.     ALBUTEROL (PROVENTIL HFA, VENTOLIN HFA, PROAIR HFA) 90 MCG/ACTUATION INHALER    Take 2 Puffs by inhalation every four (4) hours as needed for Wheezing. AMLODIPINE-BENAZEPRIL (LOTREL) 5-10 MG PER CAPSULE    Take 1 Cap by mouth daily. FOLIC ACID (FOLVITE) 1 MG TABLET    Take  by mouth daily.    These Medications have changed    No medications on file   Stop Taking    No medications on file     LAYLA Michael

## 2020-11-27 ENCOUNTER — HOSPITAL ENCOUNTER (EMERGENCY)
Age: 60
Discharge: HOME | End: 2020-11-27
Attending: STUDENT IN AN ORGANIZED HEALTH CARE EDUCATION/TRAINING PROGRAM

## 2020-11-27 VITALS
BODY MASS INDEX: 29.44 KG/M2 | WEIGHT: 160 LBS | OXYGEN SATURATION: 98 % | TEMPERATURE: 98 F | RESPIRATION RATE: 17 BRPM | SYSTOLIC BLOOD PRESSURE: 179 MMHG | HEART RATE: 89 BPM | DIASTOLIC BLOOD PRESSURE: 78 MMHG | HEIGHT: 62 IN

## 2020-11-27 DIAGNOSIS — Z76.0 MEDICATION REFILL: ICD-10-CM

## 2020-11-27 DIAGNOSIS — M54.50 CHRONIC BILATERAL LOW BACK PAIN WITHOUT SCIATICA: Primary | ICD-10-CM

## 2020-11-27 DIAGNOSIS — G89.29 CHRONIC BILATERAL LOW BACK PAIN WITHOUT SCIATICA: Primary | ICD-10-CM

## 2020-11-27 PROCEDURE — 74011250637 HC RX REV CODE- 250/637: Performed by: STUDENT IN AN ORGANIZED HEALTH CARE EDUCATION/TRAINING PROGRAM

## 2020-11-27 PROCEDURE — 99283 EMERGENCY DEPT VISIT LOW MDM: CPT

## 2020-11-27 RX ORDER — MORPHINE SULFATE 60 MG/1
60 TABLET, FILM COATED, EXTENDED RELEASE ORAL
COMMUNITY

## 2020-11-27 RX ORDER — LIDOCAINE 50 MG/G
PATCH TOPICAL
COMMUNITY
Start: 2020-08-25

## 2020-11-27 RX ORDER — MORPHINE SULFATE 60 MG/1
60 TABLET, FILM COATED, EXTENDED RELEASE ORAL
Qty: 6 TAB | Refills: 0 | Status: SHIPPED | OUTPATIENT
Start: 2020-11-27 | End: 2020-11-30

## 2020-11-27 RX ORDER — FLUTICASONE PROPIONATE AND SALMETEROL XINAFOATE 45; 21 UG/1; UG/1
2 AEROSOL, METERED RESPIRATORY (INHALATION) 2 TIMES DAILY
COMMUNITY
Start: 2020-11-12

## 2020-11-27 RX ORDER — MORPHINE SULFATE 30 MG/1
60 TABLET, FILM COATED, EXTENDED RELEASE ORAL
Status: COMPLETED | OUTPATIENT
Start: 2020-11-27 | End: 2020-11-27

## 2020-11-27 RX ADMIN — MORPHINE SULFATE 60 MG: 30 TABLET, FILM COATED, EXTENDED RELEASE ORAL at 02:09

## 2020-11-27 NOTE — ED TRIAGE NOTES
Patient has chronic back pain - patient is given MS contin 60 mg . Patient states that her MD sent Rx to [facility] - she states that pharmacy was closed. Patient requesting pain medication for he back.
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
## 2020-11-27 NOTE — DISCHARGE INSTRUCTIONS
Patient Education        Aprenda sobre cómo tener jennyfer espalda saludable  Learning About How to Have a Healthy Back  ¿Qué causa el dolor de espalda? El dolor de espalda a menudo es causado por uso excesivo, distensión o lesión. Por ejemplo, las personas frecuentemente se lastiman la espalda al practicar deportes o trabajar en el vivi, al ser sacudidas en un accidente automovilístico o al levantar algo demasiado pesado. El envejecimiento también desempeña un papel. Georgetta Party y los músculos tienden a perder fuerza a medida que envejece, lo cual aumenta las probabilidades de [facility] [place]. Los discos [employer] huesos de la columna vertebral (vértebras) podrían tener desgaste y ya no proporcionar suficiente amortiguamiento entre los [place]. Un disco que sobresale o que se rompe (hernia de disco) puede presionar sobre los nervios, causando dolor de [place]. En [employer], el dolor de espalda es el resultado de la artritis, de vértebras rotas debido a la pérdida de [employer] (osteoporosis), de jennyfer enfermedad o de un problema de la columna vertebral.  Aunque la mayoría de las personas tienen dolor de espalda en un momento u otro, hay medidas que se pueden carmelina para reducir la probabilidad de sufrirlo. ¿Cómo puede tener jennyfer espalda saludable? Reduzca la tensión en la espalda mediante jennyfer buena postura   El desplomarse o encorvarse por sí solo pedro vez no cause dolor en la parte baja de la espalda. Kandace jennyfer vez que la espalda se ha distendido o lesionado, la lilly postura puede empeorar el dolor. · Duerma en jennyfer posición que mantenga las curvas naturales de la espalda y en un colchón cómodo. Duerma de lado con jennyfer almohada entre las rodillas o boca arriba con jennyfer almohada debajo de las rodillas. Estas posiciones pueden reducir la tensión en la espalda. · Manténgase erguido cuando esté de pie o sentado.  Tener jennyfer \"buena postura\" por lo general significa que las Luis, los jean paul y [address] en línea recta. · Si debe permanecer de pie mucho tiempo, ponga un pie sobre un taburete, un bordillo o [place] O[place]. Cambie de pie cada cierto tiempo. · Siéntese en jennyfer silla que sea lo suficientemente baja emma para poner ambos pies en el suelo con las rodillas más o menos al nivel de la cadera. Si chun silla o chun escritorio son [place], utilice un reposapiés con el fin de elevar las rodillas. Colóquese jennyfer almohada pequeña, jennyfer toalla enrollada o un rollo lumbar en la curva de la espalda si necesita más apoyo. · Pruebe usar jennyfer silla ergonómica con apoyo para las rodillas (\"kneeling chair\"), pues ayuda a inclinar las caderas hacia ruthann. [place] le reduce la presión en la parte baja de la espalda. · Intente sentarse sobre jennyfer pelota de ejercicio. Puede balancearse de lado a lado, lo que ayuda a mantener la espalda relajada. · Al conducir, Dg's las rodillas alon al nivel de las caderas. Siéntese derecho y conduzca con ambas mt sobre el volante. Los brazos deben estar levemente flexionados. Reduzca la tensión en la espalda levantando objetos con cuidado   · Agáchese doblando solo la cadera y las rodillas. Si es necesario, ponga jennyfer rodilla en el suelo y extienda la otra rodilla frente a usted en ángulo recto (genuflexión). · Empuje el pecho hacia adelante. [place] le ayuda a mantener la parte superior de la espalda derecha mientras mantiene un arco ligero en la parte baja. · Sostenga la carga tan cerca del cuerpo emma sea posible, a la altura del ombligo. · Utilice los pies para cambiar de dirección con pasos cortos. · Dirija con las caderas al cambiar de dirección. Mantenga los hombros en línea con las caderas [place] se desplaza. · Asiente la carga con cuidado, acuclillándose únicamente con las rodillas y las caderas. Ejercite y estire la espalda   · [place] algo de ejercicio la mayoría de los días de la [place], si chun médico lo aprueba. Puede caminar, correr, nadar o montar en bicicleta.   · [employer] músculos de la espalda. Los siguientes son algunos ejercicios que puede probar:  ? Acuéstese de espalda y lleve suavemente jennyfer rodilla flexionada hacia Petrona Starling. Vuelva a poner renetta pie en el suelo y luego tanya lo mismo con la otra rodilla. ? Tanya inclinaciones de pelvis. Acuéstese de espalda con las rodillas flexionadas. Contraiga los músculos abdominales. Hunda el ombligo hacia adentro y [place], en dirección a las costillas. Deberá sentir emma si la espalda estuviera ejerciendo presión sobre el suelo y que las caderas y la pelvis se despegan levemente del suelo. Mantenga la posición ivette 6 segundos mientras respira de Marilynn pausada. ? Siéntese con la espalda contra la pared. · Mantenga nitin los músculos del tronco. Los músculos de la espalda, el abdomen y los glúteos sostienen la columna vertebral.  ? Hunda el abdomen e imagine que está llevando el ombligo hacia la columna. Mantenga la posición ivette 6 segundos y luego relájese. Recuerde seguir respirando de manera normal [employer]. ? [place]. Hágalos siempre con las rodillas dobladas. Mantenga la parte baja de la espalda sobre el suelo y [employer] hombros hacia las rodillas con un movimiento lento y uniforme. Mantenga los brazos cruzados sobre el pecho. Si esto le causa molestias en el medardo, pruebe a poner las mt detrás del medardo (no de la carlos), con los codos abiertos. ? Acuéstese boca arriba con las rodillas flexionadas y los pies apoyados sobre el suelo. Contraiga los músculos abdominales y luego empuje con los pies y eleve las nalgas algunas pulgadas. Mantenga la posición ivette 6 segundos mientras continúa respirando de Marilynn normal y luego vuelva a bajar lentamente hacia el suelo. Repita de 8 a 12 veces. ? Si le gusta el ejercicio en melquiades, pruebe con Pilates o yoga.  Estas clases tienen posiciones que fortalecen los músculos del tronco.  Lleve un estilo de mahad saludable   · Oneita [place]on un peso saludable para evitar sobrecargar la espalda. · No fume. Fumar aumenta el riesgo de osteoporosis, que debilita la columna vertebral. Si necesita ayuda para dejar de fumar, hable con chun médico sobre programas y medicamentos para dejar de fumar. Estos pueden aumentar dania probabilidades de dejar el hábito para siempre. ¿Dónde puede encontrar más información en inglés? Vaya a http://www.gray.com/  Escriba [address] en la búsqueda para aprender más acerca de \"Aprenda sobre cómo tener jennyfer espalda saludable. \"  Revisado: 2 marzo, 2020               Versión del contenido: 12.6  © 7727-8137 Healthwise, Incorporated. Las instrucciones de cuidado fueron adaptadas bajo licencia por [employer] (which disclaims liability or warranty for this information). Si usted tiene [place] afección médica o sobre estas instrucciones, siempre pregunte a chun profesional de apollo. Healthwise, Incorporated niega toda garantía o responsabilidad por chun uso de esta información.

## 2020-11-30 ENCOUNTER — HOSPITAL ENCOUNTER (EMERGENCY)
Age: 60
Discharge: HOME | End: 2020-11-30
Attending: EMERGENCY MEDICINE

## 2020-11-30 VITALS
SYSTOLIC BLOOD PRESSURE: 139 MMHG | DIASTOLIC BLOOD PRESSURE: 89 MMHG | RESPIRATION RATE: 18 BRPM | HEART RATE: 98 BPM | TEMPERATURE: 98 F | OXYGEN SATURATION: 99 %

## 2020-11-30 DIAGNOSIS — G89.29 CHRONIC BACK PAIN, UNSPECIFIED BACK LOCATION, UNSPECIFIED BACK PAIN LATERALITY: Primary | ICD-10-CM

## 2020-11-30 DIAGNOSIS — M54.9 CHRONIC BACK PAIN, UNSPECIFIED BACK LOCATION, UNSPECIFIED BACK PAIN LATERALITY: Primary | ICD-10-CM

## 2020-11-30 PROCEDURE — 99283 EMERGENCY DEPT VISIT LOW MDM: CPT

## 2020-11-30 PROCEDURE — 74011250637 HC RX REV CODE- 250/637: Performed by: PHYSICIAN ASSISTANT

## 2020-11-30 RX ORDER — MORPHINE SULFATE 30 MG/1
60 TABLET, FILM COATED, EXTENDED RELEASE ORAL
Status: COMPLETED | OUTPATIENT
Start: 2020-11-30 | End: 2020-11-30

## 2020-11-30 RX ADMIN — MORPHINE SULFATE 60 MG: 30 TABLET, FILM COATED, EXTENDED RELEASE ORAL at 17:46
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
## 2020-11-30 NOTE — DISCHARGE INSTRUCTIONS
Patient Education        Mirlande He del dolor de espalda - [ Earlie Plater About Relief for Back Pain ]  ¿Qué son la tensión y la distensión en la espalda? La distensión en la espalda ocurre cuando usted estira [place], o fuerza, un músculo de la espalda. Usted puede lesionarse la espalda en un accidente o cuando hace ejercicio o levanta algo. La mayoría de los rashad de espalda mejoran con reposo y con el tiempo. Usted puede cuidarse en el hogar para ayudar a que chun espalda sane. ¿Qué es lo maxwell que puede hacer para aliviar el dolor de espalda? Cuando empiece a sentir dolor de espalda, siga estos pasos:  · Camine. Dé un paseo corto (10 a 20 minutos) sobre jennyfer superficie plana (sin pendientes, donde no haya colinas o escaleras) cada 2 o 3 horas. Solo camine distancias que pueda manejar sin dolor, especialmente dolor en las piernas. · Relájese. Encuentre jennyfer posición cómoda para descansar. Algunas personas se sienten cómodas en el suelo o en jennyfer cama de firmeza media con jennyfer almohada pequeña debajo de la carlos y otra debajo de las rodillas. Otras prefieren acostarse de lado con jennyfer almohada entre las rodillas. No permanezca en jennyfer posición por [employer]. · Pruebe con calor o hielo. Trate de [employer] almohadilla térmica a baja o media temperatura, o tome jennyfer ducha tibia de 15 o 20 minutos cada 2 o 3 horas. O puede comprar vendas térmicas desechables que se usan jennyfer salazar vez por hasta 8 horas. También puede probar compresas de hielo entre 10 y 15 minutos cada 2 o 3 horas. Puede usar jennyfer compresa de hielo o jennyfer bolsa de verduras congeladas envuelta en jennyfer toalla delgada. No existe evidencia sólida de que el calor o el hielo ayuden, dakota puede probarlos para pricila si son de [place]. También podría convenirle probar alternar [employer] frío y el calor. · [employer] analgésicos (medicamentos para el dolor) exactamente según las indicaciones.   ¨ Si el médico le recetó un analgésico, tómelo según las indicaciones. ¨ Si no está tomando un analgésico recetado, pregúntele a chun médico si puede carmelina kiley de [employer]. ¿Qué más puede hacer? · Mahnaz Sample estiramientos y ejercicio. Los ejercicios que incrementan la flexibilidad pueden aliviar el dolor y facilitar que los músculos mantengan a la columna vertebral en jennyfer buena posición neutra. Y no se olvide de seguir caminando. · Hágase masajes usted mismo. Usted puede darse masaje para relajarse después del trabajo o de la escuela o para sentirse lleno de energía en la mañana. No es difícil [employer], las mt o el medardo. El darse masaje usted mismo funciona mejor si está con ropa cómoda y sentado o [place] en jennyfer posición cómoda. Utilice aceite o loción para masajearse la piel directamente. · Reduzca el estrés. El dolor de espalda puede llevar a un círculo paulette: La angustia por el dolor tensa los músculos en la espalda, lo que a chun vez causa más dolor. Aprenda a relajar la mente y los músculos para disminuir el estrés. ¿Dónde puede encontrar más información en inglés? Rommel Stockton a http://hayley-derrick.info/. Job Limb V722 en la búsqueda para aprender más acerca de \"Aprenda sobre el [place]frat[place]ausen del dolor de espalda - [ Learning About Relief for Back Pain ]. \"  Revisado: 21 marzo, 2017  Versión del contenido: 11.5  © 3699-4864 Healthwise, Incorporated. Las instrucciones de cuidado fueron adaptadas bajo licencia por [employer] (which disclaims liability or warranty for this information). Si usted tiene [place] Amston afección médica o sobre estas instrucciones, siempre pregunte a chun profesional de apollo. [facility]orporated niega toda garantía o responsabilidad por chun uso de esta información.

## 2021-01-30 ENCOUNTER — HOSPITAL ENCOUNTER (EMERGENCY)
Age: 61
Discharge: STILL ADMITTED OR ADMINISTRATIVE | End: 2021-01-30
Attending: EMERGENCY MEDICINE

## 2021-01-30 PROCEDURE — 75810000275 HC EMERGENCY DEPT VISIT NO LEVEL OF CARE

## 2021-01-30 NOTE — ED TRIAGE NOTES
Pt does speak some English, but broken.  video utilized. David Victor. # [id number]. Pt states that she came in today because I took my last pain pill last night. I have an appointment on the 26th, but I cannot be without the medication. Pt states that she takes the Morphine 60 mg every 8 hours, but will take it every 6 hours if the pain is real bad. Pt states her last pill was taken yesterday at 1500. Pt states that Dr. Candace Couch was supposed to send the RX to [facility] yesterday, but it was sent to [place] [address] tells the pt that they cannot fill the RX until Dr. Candace Couch sends a form. LAYLA Austin at bedside speaking with pt through  service. Chart will be created in error.

## 2021-01-30 NOTE — ED NOTES
Patient arrives requesting a prescription for morphine 60 mg to be sent to [facility], states that this prescription was sent to [facility] in error by her pain management doctor. I discussed with the help of an  that this could not be done through the emergency department. Did discuss plans of course chronic pain medication policy with the patient. Have advised patient to go to [facility] and have them requested prescription be sent from [facility] to [facility].  Have also advised patient to call the office and attempt to get a hold of the on-call doctor. Patient prefers to be arrived in error because we are unable to help her.

## 2022-03-18 PROBLEM — J44.9 COPD (CHRONIC OBSTRUCTIVE PULMONARY DISEASE) (HCC): Status: ACTIVE | Noted: 2020-02-25

## 2022-03-18 PROBLEM — J18.9 CAP (COMMUNITY ACQUIRED PNEUMONIA): Status: ACTIVE | Noted: 2020-02-25

## 2022-03-20 PROBLEM — I10 HTN (HYPERTENSION): Status: ACTIVE | Noted: 2020-02-25

## 2023-09-13 ENCOUNTER — OFFICE VISIT (OUTPATIENT)
Facility: CLINIC | Age: 63
End: 2023-09-13
Payer: COMMERCIAL

## 2023-09-13 VITALS
HEART RATE: 84 BPM | DIASTOLIC BLOOD PRESSURE: 57 MMHG | HEIGHT: 62 IN | SYSTOLIC BLOOD PRESSURE: 103 MMHG | BODY MASS INDEX: 28.16 KG/M2 | OXYGEN SATURATION: 95 % | WEIGHT: 153 LBS | RESPIRATION RATE: 16 BRPM | TEMPERATURE: 98.2 F

## 2023-09-13 DIAGNOSIS — Z76.89 ENCOUNTER TO ESTABLISH CARE: Primary | ICD-10-CM

## 2023-09-13 DIAGNOSIS — Z80.6 FAMILY HISTORY OF LEUKEMIA: ICD-10-CM

## 2023-09-13 DIAGNOSIS — F33.0 MILD EPISODE OF RECURRENT MAJOR DEPRESSIVE DISORDER (HCC): ICD-10-CM

## 2023-09-13 DIAGNOSIS — Z78.0 ASYMPTOMATIC MENOPAUSAL STATE: ICD-10-CM

## 2023-09-13 DIAGNOSIS — Z11.4 ENCOUNTER FOR SCREENING FOR HIV: ICD-10-CM

## 2023-09-13 DIAGNOSIS — R06.00 DYSPNEA, UNSPECIFIED TYPE: ICD-10-CM

## 2023-09-13 DIAGNOSIS — M81.0 OSTEOPOROSIS WITHOUT CURRENT PATHOLOGICAL FRACTURE, UNSPECIFIED OSTEOPOROSIS TYPE: ICD-10-CM

## 2023-09-13 DIAGNOSIS — N81.10 ACQUIRED FEMALE BLADDER PROLAPSE: ICD-10-CM

## 2023-09-13 DIAGNOSIS — R59.0 LYMPHADENOPATHY, HILAR: ICD-10-CM

## 2023-09-13 DIAGNOSIS — G89.29 OTHER CHRONIC PAIN: ICD-10-CM

## 2023-09-13 DIAGNOSIS — Z12.31 ENCOUNTER FOR SCREENING MAMMOGRAM FOR MALIGNANT NEOPLASM OF BREAST: ICD-10-CM

## 2023-09-13 DIAGNOSIS — Z13.820 SCREENING FOR OSTEOPOROSIS: ICD-10-CM

## 2023-09-13 DIAGNOSIS — J44.9 CHRONIC OBSTRUCTIVE PULMONARY DISEASE, UNSPECIFIED COPD TYPE (HCC): ICD-10-CM

## 2023-09-13 DIAGNOSIS — Z86.79 H/O PRIMARY HYPERTENSION: ICD-10-CM

## 2023-09-13 DIAGNOSIS — Z98.890 S/P RIGHT ROTATOR CUFF REPAIR: ICD-10-CM

## 2023-09-13 DIAGNOSIS — Z98.1 S/P LUMBAR SPINAL FUSION: ICD-10-CM

## 2023-09-13 DIAGNOSIS — M06.09 RHEUMATOID ARTHRITIS OF MULTIPLE SITES WITH NEGATIVE RHEUMATOID FACTOR (HCC): ICD-10-CM

## 2023-09-13 PROBLEM — I10 HTN (HYPERTENSION): Status: RESOLVED | Noted: 2020-02-25 | Resolved: 2023-09-13

## 2023-09-13 PROCEDURE — 99204 OFFICE O/P NEW MOD 45 MIN: CPT

## 2023-09-13 RX ORDER — MONTELUKAST SODIUM 10 MG/1
10 TABLET ORAL DAILY
COMMUNITY
Start: 2022-12-12 | End: 2023-09-13 | Stop reason: ALTCHOICE

## 2023-09-13 RX ORDER — FLUTICASONE PROPIONATE AND SALMETEROL 100; 50 UG/1; UG/1
1 POWDER RESPIRATORY (INHALATION) EVERY 12 HOURS
Qty: 60 EACH | Refills: 4 | Status: SHIPPED | OUTPATIENT
Start: 2023-09-13

## 2023-09-13 RX ORDER — ALBUTEROL SULFATE 90 UG/1
2 AEROSOL, METERED RESPIRATORY (INHALATION) EVERY 6 HOURS PRN
Qty: 18 G | Refills: 3 | Status: SHIPPED | OUTPATIENT
Start: 2023-09-13

## 2023-09-13 RX ORDER — ERGOCALCIFEROL 1.25 MG/1
50000 CAPSULE ORAL WEEKLY
COMMUNITY
Start: 2022-12-12

## 2023-09-13 RX ORDER — MONTELUKAST SODIUM 10 MG/1
10 TABLET ORAL DAILY
Qty: 30 TABLET | Refills: 3 | Status: SHIPPED | OUTPATIENT
Start: 2023-09-13

## 2023-09-13 SDOH — ECONOMIC STABILITY: INCOME INSECURITY: HOW HARD IS IT FOR YOU TO PAY FOR THE VERY BASICS LIKE FOOD, HOUSING, MEDICAL CARE, AND HEATING?: NOT HARD AT ALL

## 2023-09-13 SDOH — ECONOMIC STABILITY: FOOD INSECURITY: WITHIN THE PAST 12 MONTHS, YOU WORRIED THAT YOUR FOOD WOULD RUN OUT BEFORE YOU GOT MONEY TO BUY MORE.: NEVER TRUE

## 2023-09-13 SDOH — ECONOMIC STABILITY: HOUSING INSECURITY
IN THE LAST 12 MONTHS, WAS THERE A TIME WHEN YOU DID NOT HAVE A STEADY PLACE TO SLEEP OR SLEPT IN A SHELTER (INCLUDING NOW)?: NO

## 2023-09-13 SDOH — ECONOMIC STABILITY: FOOD INSECURITY: WITHIN THE PAST 12 MONTHS, THE FOOD YOU BOUGHT JUST DIDN'T LAST AND YOU DIDN'T HAVE MONEY TO GET MORE.: NEVER TRUE

## 2023-09-13 ASSESSMENT — ANXIETY QUESTIONNAIRES
IF YOU CHECKED OFF ANY PROBLEMS ON THIS QUESTIONNAIRE, HOW DIFFICULT HAVE THESE PROBLEMS MADE IT FOR YOU TO DO YOUR WORK, TAKE CARE OF THINGS AT HOME, OR GET ALONG WITH OTHER PEOPLE: VERY DIFFICULT
2. NOT BEING ABLE TO STOP OR CONTROL WORRYING: 0
1. FEELING NERVOUS, ANXIOUS, OR ON EDGE: 0
4. TROUBLE RELAXING: 0
5. BEING SO RESTLESS THAT IT IS HARD TO SIT STILL: 0
7. FEELING AFRAID AS IF SOMETHING AWFUL MIGHT HAPPEN: 0
6. BECOMING EASILY ANNOYED OR IRRITABLE: 0
GAD7 TOTAL SCORE: 0
3. WORRYING TOO MUCH ABOUT DIFFERENT THINGS: 0

## 2023-09-13 ASSESSMENT — PATIENT HEALTH QUESTIONNAIRE - PHQ9
10. IF YOU CHECKED OFF ANY PROBLEMS, HOW DIFFICULT HAVE THESE PROBLEMS MADE IT FOR YOU TO DO YOUR WORK, TAKE CARE OF THINGS AT HOME, OR GET ALONG WITH OTHER PEOPLE: 1
SUM OF ALL RESPONSES TO PHQ QUESTIONS 1-9: 3
6. FEELING BAD ABOUT YOURSELF - OR THAT YOU ARE A FAILURE OR HAVE LET YOURSELF OR YOUR FAMILY DOWN: 0
SUM OF ALL RESPONSES TO PHQ QUESTIONS 1-9: 3
5. POOR APPETITE OR OVEREATING: 0
2. FEELING DOWN, DEPRESSED OR HOPELESS: 1
9. THOUGHTS THAT YOU WOULD BE BETTER OFF DEAD, OR OF HURTING YOURSELF: 0
1. LITTLE INTEREST OR PLEASURE IN DOING THINGS: 1
4. FEELING TIRED OR HAVING LITTLE ENERGY: 0
SUM OF ALL RESPONSES TO PHQ9 QUESTIONS 1 & 2: 2
SUM OF ALL RESPONSES TO PHQ QUESTIONS 1-9: 3
7. TROUBLE CONCENTRATING ON THINGS, SUCH AS READING THE NEWSPAPER OR WATCHING TELEVISION: 0
SUM OF ALL RESPONSES TO PHQ QUESTIONS 1-9: 3
3. TROUBLE FALLING OR STAYING ASLEEP: 0
8. MOVING OR SPEAKING SO SLOWLY THAT OTHER PEOPLE COULD HAVE NOTICED. OR THE OPPOSITE, BEING SO FIGETY OR RESTLESS THAT YOU HAVE BEEN MOVING AROUND A LOT MORE THAN USUAL: 1

## 2023-09-13 NOTE — PROGRESS NOTES
Patient ID: April Eden is a 61 y.o. female established patient presents for the following:      Subjective:     Patient presents to establish care.  was used during encounter. Previously on morphine  and humira  from rheumatology and pain management. Hx of COPD and chronic cough related to second hand smoke. She reports that she has never smoked herself but her  used to smoke. Colonoscopy was last done 3 years ago by [facility]. Past Medical History:   Diagnosis Date    Chronic obstructive pulmonary disease ([address])     Hypertension     Other ill-defined conditions(799.89)     chronic pain    Trauma        Past Surgical History:   Procedure Laterality Date    ORTHOPEDIC SURGERY      repair fracture back       Current Outpatient Medications   Medication Sig Dispense Refill    ergocalciferol (ERGOCALCIFEROL) 1.25 MG (66400 UT) capsule Take 1 capsule by mouth once a week      albuterol sulfate HFA (PROVENTIL HFA) 108 (90 Base) MCG/ACT inhaler Inhale 2 puffs into the lungs every 6 hours as needed for Wheezing 18 g 3    fluticasone-salmeterol (ADVAIR DISKUS) 100-50 MCG/ACT AEPB diskus inhaler Inhale 1 puff into the lungs in the morning and 1 puff in the evening.  60 each 4    montelukast (SINGULAIR) 10 MG tablet Take 1 tablet by mouth daily 30 tablet 3    adalimumab (HUMIRA) 40 MG/0.8ML injection Inject into the skin once      albuterol sulfate HFA (PROVENTIL;VENTOLIN;PROAIR) 108 (90 Base) MCG/ACT inhaler Inhale 2 puffs into the lungs every 4 hours as needed      amLODIPine-benazepril (LOTREL) 5-10 MG per capsule Take 1 capsule by mouth daily      folic acid (FOLVITE) 1 MG tablet Take by mouth daily      lidocaine (LIDODERM) 5 % Apply 1 patch as directed for 12 hours every 24 hours (12 hours on, 12 hours off)      morphine (MS CONTIN) 60 MG extended release tablet Take 1 tablet by mouth.      miconazole (MICOTIN) 2 % cream Apply topically 2 times daily (Patient not taking:

## 2023-09-13 NOTE — PROGRESS NOTES
Raymond So is a 61y.o. year old female who presents in office today for   Chief Complaint   Patient presents with    Establish Care       Is someone accompanying this pt? Heydi Serrano #[id number]    Is the patient using any DME equipment during OV? NO     Depression Screening:        No data to display                Abuse Screenin/13/2023     2:00 PM   AMB Abuse Screening   Do you ever feel afraid of your partner? N   Are you in a relationship with someone who physically or mentally threatens you? N   Is it safe for you to go home? Y       Learning Assessment:  Who is the primary learner? Patient    What is the preferred language for health care of the primary learner? [place]    How does the primary learner prefer to learn new concepts? DEMONSTRATION    How does the primary learner prefer to learn new concepts? OTHER (COMMENT) HANDS ON   Answered By Thomas Corbett #[id number]   Relationship to Learner OTHER    Highest level of education completed by primary learner? DID NOT GRADUATE HIGH SCHOOL 9TH GRADE   Are there any barriers / factors that could impact learning? NONE    Will there be a co-learner / caregiver? No    Co-Learner / Caregiver Name (if applicable) NO        Fall Risk:       No data to display                    Coordination of Care:   1. \"Have you been to the ER, urgent care clinic since your last visit? Hospitalized since your last visit? \" NO    2. \"Have you seen or consulted any other health care providers outside of the [address] [place] [address] since your last visit? \"     3. For patients aged 43-73: Has the patient had a colonoscopy / FIT/ Cologuard? DUE    If the patient is female:    4. For patients aged 43-66: Has the patient had a mammogram within the past 2 years? DUE    5. For patients aged 21-65: Has the patient had a pap smear? DUE    Health Maintenance: reviewed and discussed and ordered per Provider.     Health Maintenance Due   Topic Date Due    Depression Screen  Never

## 2023-09-21 ENCOUNTER — TELEPHONE (OUTPATIENT)
Facility: CLINIC | Age: 63
End: 2023-09-21

## 2023-09-21 ASSESSMENT — ENCOUNTER SYMPTOMS
EYES NEGATIVE: 1
DIARRHEA: 0
CONSTIPATION: 0
ABDOMINAL PAIN: 0
TROUBLE SWALLOWING: 0
SHORTNESS OF BREATH: 1
SORE THROAT: 0
NAUSEA: 0
BLOOD IN STOOL: 0
BACK PAIN: 1
WHEEZING: 0
VOMITING: 0
COUGH: 1
CHEST TIGHTNESS: 0

## 2023-09-25 RX ORDER — FLUTICASONE PROPIONATE AND SALMETEROL XINAFOATE 45; 21 UG/1; UG/1
2 AEROSOL, METERED RESPIRATORY (INHALATION) 2 TIMES DAILY
Qty: 12 G | Refills: 5 | Status: SHIPPED | OUTPATIENT
Start: 2023-09-25

## 2023-10-13 PROBLEM — Z13.820 SCREENING FOR OSTEOPOROSIS: Status: RESOLVED | Noted: 2023-09-13 | Resolved: 2023-10-13

## 2023-11-06 ENCOUNTER — NURSE ONLY (OUTPATIENT)
Facility: CLINIC | Age: 63
End: 2023-11-06

## 2023-11-06 DIAGNOSIS — Z11.4 ENCOUNTER FOR SCREENING FOR HIV: ICD-10-CM

## 2023-11-06 DIAGNOSIS — M81.0 OSTEOPOROSIS WITHOUT CURRENT PATHOLOGICAL FRACTURE, UNSPECIFIED OSTEOPOROSIS TYPE: ICD-10-CM

## 2023-11-06 DIAGNOSIS — Z86.79 H/O PRIMARY HYPERTENSION: ICD-10-CM

## 2023-11-07 LAB
A/G RATIO: 1.6 RATIO (ref 1.1–2.6)
ALBUMIN SERPL-MCNC: 4.2 G/DL (ref 3.5–5)
ALP BLD-CCNC: 75 U/L (ref 40–120)
ALT SERPL-CCNC: 28 U/L (ref 5–40)
ANION GAP SERPL CALCULATED.3IONS-SCNC: 13 MMOL/L (ref 3–15)
AST SERPL-CCNC: 28 U/L (ref 10–37)
BACTERIA: NEGATIVE
BASOPHILS # BLD: 1 % (ref 0–2)
BASOPHILS ABSOLUTE: 0.1 K/UL (ref 0–0.2)
BILIRUB SERPL-MCNC: 0.5 MG/DL (ref 0.2–1.2)
BILIRUB SERPL-MCNC: NEGATIVE MG/DL
BILIRUBIN DIRECT: <0.2 MG/DL (ref 0–0.3)
BLOOD: NEGATIVE
BUN BLDV-MCNC: 7 MG/DL (ref 6–22)
CALCIUM SERPL-MCNC: 8.9 MG/DL (ref 8.4–10.5)
CHLORIDE BLD-SCNC: 108 MMOL/L (ref 98–110)
CHOLESTEROL/HDL RATIO: 4.1 (ref 0–5)
CHOLESTEROL: 214 MG/DL (ref 110–200)
CLARITY, UA: CLEAR
CO2: 21 MMOL/L (ref 20–32)
COLOR, UA: YELLOW
CREAT SERPL-MCNC: 0.6 MG/DL (ref 0.8–1.4)
EOSINOPHIL # BLD: 7 % (ref 0–6)
EOSINOPHILS ABSOLUTE: 0.5 K/UL (ref 0–0.5)
EPITHELIAL CELLS: ABNORMAL /HPF
ESTIMATED AVERAGE GLUCOSE: 106 MG/DL (ref 91–123)
GLOBULIN: 2.6 G/DL (ref 2–4)
GLOMERULAR FILTRATION RATE: >60 ML/MIN/1.73 SQ.M.
GLUCOSE: 102 MG/DL (ref 70–99)
GLUCOSE: NEGATIVE MG/DL
HBA1C MFR BLD: 5.3 % (ref 4.8–5.6)
HCT VFR BLD CALC: 40.3 % (ref 35.1–48)
HDLC SERPL-MCNC: 52 MG/DL
HEMOGLOBIN: 13.3 G/DL (ref 11.7–16)
HEPATITIS C ANTIBODY: NORMAL
HIV -1/0/2 AG/AB WITH REFLEX: NON REACTIVE
HIV INTERPRETATION: NORMAL
HYALINE CASTS: ABNORMAL /LPF (ref 0–2)
KETONES, URINE: NEGATIVE MG/DL
LDL CHOLESTEROL CALCULATED: 138 MG/DL (ref 50–99)
LDL/HDL RATIO: 2.7
LEUKOCYTE ESTERASE, URINE: NEGATIVE
LYMPHOCYTES # BLD: 42 % (ref 20–45)
LYMPHOCYTES ABSOLUTE: 2.6 K/UL (ref 1–4.8)
MCH RBC QN AUTO: 31 PG (ref 26–34)
MCHC RBC AUTO-ENTMCNC: 33 G/DL (ref 31–36)
MCV RBC AUTO: 92 FL (ref 80–99)
MONOCYTES ABSOLUTE: 0.4 K/UL (ref 0.1–1)
MONOCYTES: 7 % (ref 3–12)
NEUTROPHILS ABSOLUTE: 2.6 K/UL (ref 1.8–7.7)
NEUTROPHILS: 42 % (ref 40–75)
NITRITE, URINE: NEGATIVE
NON-HDL CHOLESTEROL: 162 MG/DL
PDW BLD-RTO: 13.6 % (ref 10–15.5)
PH, URINE: 6.5 PH (ref 5–8)
PLATELET # BLD: 357 K/UL (ref 140–440)
PMV BLD AUTO: 9.9 FL (ref 9–13)
POTASSIUM SERPL-SCNC: 4.5 MMOL/L (ref 3.5–5.5)
PROTEIN UA: NEGATIVE MG/DL
RBC URINE: ABNORMAL /HPF
RBC: 4.36 M/UL (ref 3.8–5.2)
SODIUM BLD-SCNC: 142 MMOL/L (ref 133–145)
SPECIFIC GRAVITY: 1.01 (ref 1–1.03)
TOTAL PROTEIN: 6.8 G/DL (ref 6.2–8.1)
TRIGL SERPL-MCNC: 117 MG/DL (ref 40–149)
TSH SERPL DL<=0.05 MIU/L-ACNC: 3.61 MCU/ML (ref 0.27–4.2)
UROBILINOGEN: 0.2 MG/DL
VITAMIN D 25-HYDROXY: 36.9 NG/ML (ref 32–100)
VLDLC SERPL CALC-MCNC: 23 MG/DL (ref 8–30)
WBC UA: ABNORMAL /HPF (ref 0–5)
WBC: 6.1 K/UL (ref 4–11)

## 2023-11-14 ENCOUNTER — OFFICE VISIT (OUTPATIENT)
Facility: CLINIC | Age: 63
End: 2023-11-14
Payer: COMMERCIAL

## 2023-11-14 VITALS
OXYGEN SATURATION: 94 % | HEART RATE: 77 BPM | DIASTOLIC BLOOD PRESSURE: 78 MMHG | SYSTOLIC BLOOD PRESSURE: 172 MMHG | WEIGHT: 153 LBS | TEMPERATURE: 98 F | BODY MASS INDEX: 28.16 KG/M2 | HEIGHT: 62 IN | RESPIRATION RATE: 16 BRPM

## 2023-11-14 DIAGNOSIS — M06.09 RHEUMATOID ARTHRITIS OF MULTIPLE SITES WITH NEGATIVE RHEUMATOID FACTOR (HCC): ICD-10-CM

## 2023-11-14 DIAGNOSIS — I10 PRIMARY HYPERTENSION: ICD-10-CM

## 2023-11-14 DIAGNOSIS — J44.9 CHRONIC OBSTRUCTIVE PULMONARY DISEASE, UNSPECIFIED COPD TYPE (HCC): ICD-10-CM

## 2023-11-14 DIAGNOSIS — M81.0 OSTEOPOROSIS WITHOUT CURRENT PATHOLOGICAL FRACTURE, UNSPECIFIED OSTEOPOROSIS TYPE: ICD-10-CM

## 2023-11-14 DIAGNOSIS — E78.2 MIXED HYPERLIPIDEMIA: Primary | ICD-10-CM

## 2023-11-14 PROCEDURE — 3078F DIAST BP <80 MM HG: CPT

## 2023-11-14 PROCEDURE — 3074F SYST BP LT 130 MM HG: CPT

## 2023-11-14 PROCEDURE — 99213 OFFICE O/P EST LOW 20 MIN: CPT

## 2023-11-14 RX ORDER — LOSARTAN POTASSIUM AND HYDROCHLOROTHIAZIDE 12.5; 5 MG/1; MG/1
1 TABLET ORAL DAILY
Qty: 30 TABLET | Refills: 0 | Status: SHIPPED | OUTPATIENT
Start: 2023-11-14

## 2023-11-14 RX ORDER — ATORVASTATIN CALCIUM 40 MG/1
40 TABLET, FILM COATED ORAL DAILY
Qty: 90 TABLET | Refills: 1 | Status: SHIPPED | OUTPATIENT
Start: 2023-11-14

## 2023-11-14 RX ORDER — FLUTICASONE PROPIONATE AND SALMETEROL XINAFOATE 115; 21 UG/1; UG/1
2 AEROSOL, METERED RESPIRATORY (INHALATION) 2 TIMES DAILY
Qty: 12 G | Refills: 3 | Status: SHIPPED | OUTPATIENT
Start: 2023-11-14

## 2023-11-14 RX ORDER — CETIRIZINE HYDROCHLORIDE 10 MG/1
10 TABLET ORAL DAILY
Qty: 30 TABLET | Refills: 0 | Status: SHIPPED | OUTPATIENT
Start: 2023-11-14 | End: 2023-12-14

## 2023-11-14 SDOH — ECONOMIC STABILITY: INCOME INSECURITY: HOW HARD IS IT FOR YOU TO PAY FOR THE VERY BASICS LIKE FOOD, HOUSING, MEDICAL CARE, AND HEATING?: NOT HARD AT ALL

## 2023-11-14 SDOH — ECONOMIC STABILITY: FOOD INSECURITY: WITHIN THE PAST 12 MONTHS, YOU WORRIED THAT YOUR FOOD WOULD RUN OUT BEFORE YOU GOT MONEY TO BUY MORE.: NEVER TRUE

## 2023-11-14 SDOH — ECONOMIC STABILITY: FOOD INSECURITY: WITHIN THE PAST 12 MONTHS, THE FOOD YOU BOUGHT JUST DIDN'T LAST AND YOU DIDN'T HAVE MONEY TO GET MORE.: NEVER TRUE

## 2023-11-14 NOTE — PROGRESS NOTES
Kimber Olivares is a 61y.o. year old female who presents in office today for   Chief Complaint   Patient presents with    Follow-up    Discuss Labs    Migraine       Is someone accompanying this pt? NO    Is the patient using any DME equipment during OV? NO    Depression Screenin/13/2023     2:39 PM   PHQ-9 Questionaire   Little interest or pleasure in doing things 1   Feeling down, depressed, or hopeless 1   Trouble falling or staying asleep, or sleeping too much 0   Feeling tired or having little energy 0   Poor appetite or overeating 0   Feeling bad about yourself - or that you are a failure or have let yourself or your family down 0   Trouble concentrating on things, such as reading the newspaper or watching television 0   Moving or speaking so slowly that other people could have noticed. Or the opposite - being so fidgety or restless that you have been moving around a lot more than usual 1   Thoughts that you would be better off dead, or of hurting yourself in some way 0   PHQ-9 Total Score 3   If you checked off any problems, how difficult have these problems made it for you to do your work, take care of things at home, or get along with other people? 1       Abuse Screenin/13/2023     2:00 PM   AMB Abuse Screening   Do you ever feel afraid of your partner? N   Are you in a relationship with someone who physically or mentally threatens you? N   Is it safe for you to go home? Y       Learning Assessment:  No question data found. Fall Risk:       No data to display                    Coordination of Care:   1. \"Have you been to the ER, urgent care clinic since your last visit? Hospitalized since your last visit? \" NO    2. \"Have you seen or consulted any other health care providers outside of the [address] [place] [address] since your last visit? \" NO    3. For patients aged 43-73: Has the patient had a colonoscopy / FIT/ Cologuard? DUE    If the patient is female:    4.  For patients aged 43-66:

## 2023-11-14 NOTE — PROGRESS NOTES
Patient ID: Snehal [place] is a 61 y.o. female established patient presents for the following:      Subjective:     HPI   was used for encounter. Snehal [place] presents for follow-up after establishing care. encounter. Previously on morphine  and humira  from rheumatology and pain management. Hx of COPD and chronic cough related to second hand smoke. She reports that she has never smoked herself but her  used to smoke. Colonoscopy was last done 3 years ago by [facility]. She reports that she is running out of her humira and has concern of getting medications as she switched her pharmacy.      Past Medical History:   Diagnosis Date    Chronic obstructive pulmonary disease ([address])     Hypertension     Other ill-defined conditions(799.89)     chronic pain    Trauma        Past Surgical History:   Procedure Laterality Date    ORTHOPEDIC SURGERY      repair fracture back       Current Outpatient Medications   Medication Sig Dispense Refill    fluticasone-salmeterol (ADVAIR HFA) 115-21 MCG/ACT inhaler Inhale 2 puffs into the lungs 2 times daily 12 g 3    adalimumab (HUMIRA) 40 MG/0.8ML injection Inject 0.8 mLs into the skin every 14 days 4 kit 3    cetirizine (ZYRTEC) 10 MG tablet Take 1 tablet by mouth daily 30 tablet 0    losartan-hydroCHLOROthiazide (HYZAAR) 50-12.5 MG per tablet Take 1 tablet by mouth daily 30 tablet 0    atorvastatin (LIPITOR) 40 MG tablet Take 1 tablet by mouth daily 90 tablet 1    ergocalciferol (ERGOCALCIFEROL) 1.25 MG (10668 UT) capsule Take 1 capsule by mouth once a week      albuterol sulfate HFA (PROVENTIL HFA) 108 (90 Base) MCG/ACT inhaler Inhale 2 puffs into the lungs every 6 hours as needed for Wheezing 18 g 3    montelukast (SINGULAIR) 10 MG tablet Take 1 tablet by mouth daily 30 tablet 3    albuterol sulfate HFA (PROVENTIL;VENTOLIN;PROAIR) 108 (90 Base) MCG/ACT inhaler Inhale 2 puffs into the lungs every 4 hours as needed      folic acid (FOLVITE) 1 MG tablet

## 2023-11-26 ASSESSMENT — ENCOUNTER SYMPTOMS
SHORTNESS OF BREATH: 1
SORE THROAT: 0
WHEEZING: 0
ABDOMINAL PAIN: 0
TROUBLE SWALLOWING: 0
NAUSEA: 0
EYES NEGATIVE: 1
COUGH: 1
BACK PAIN: 1
CHEST TIGHTNESS: 0
VOMITING: 0
DIARRHEA: 0
CONSTIPATION: 0
BLOOD IN STOOL: 0

## 2023-12-04 ENCOUNTER — OFFICE VISIT (OUTPATIENT)
Facility: CLINIC | Age: 63
End: 2023-12-04
Payer: COMMERCIAL

## 2023-12-04 VITALS
WEIGHT: 148.6 LBS | HEART RATE: 70 BPM | DIASTOLIC BLOOD PRESSURE: 63 MMHG | BODY MASS INDEX: 27.34 KG/M2 | TEMPERATURE: 97.7 F | OXYGEN SATURATION: 99 % | SYSTOLIC BLOOD PRESSURE: 104 MMHG | HEIGHT: 62 IN | RESPIRATION RATE: 16 BRPM

## 2023-12-04 DIAGNOSIS — I10 PRIMARY HYPERTENSION: ICD-10-CM

## 2023-12-04 DIAGNOSIS — M06.09 RHEUMATOID ARTHRITIS OF MULTIPLE SITES WITH NEGATIVE RHEUMATOID FACTOR (HCC): Primary | ICD-10-CM

## 2023-12-04 PROCEDURE — 3074F SYST BP LT 130 MM HG: CPT | Performed by: STUDENT IN AN ORGANIZED HEALTH CARE EDUCATION/TRAINING PROGRAM

## 2023-12-04 PROCEDURE — 3078F DIAST BP <80 MM HG: CPT | Performed by: STUDENT IN AN ORGANIZED HEALTH CARE EDUCATION/TRAINING PROGRAM

## 2023-12-04 PROCEDURE — 99214 OFFICE O/P EST MOD 30 MIN: CPT | Performed by: STUDENT IN AN ORGANIZED HEALTH CARE EDUCATION/TRAINING PROGRAM

## 2023-12-04 RX ORDER — HYDROXYCHLOROQUINE SULFATE 200 MG/1
200 TABLET, FILM COATED ORAL DAILY
Qty: 90 TABLET | Refills: 1 | Status: SHIPPED | OUTPATIENT
Start: 2023-12-04

## 2023-12-04 RX ORDER — LOSARTAN POTASSIUM 50 MG/1
50 TABLET ORAL DAILY
Qty: 90 TABLET | Refills: 1 | Status: SHIPPED | OUTPATIENT
Start: 2023-12-04

## 2023-12-04 RX ORDER — HYDROCHLOROTHIAZIDE 25 MG/1
25 TABLET ORAL DAILY
COMMUNITY
Start: 2023-08-18 | End: 2023-12-04

## 2023-12-04 RX ORDER — HYDROXYCHLOROQUINE SULFATE 200 MG/1
200 TABLET, FILM COATED ORAL DAILY
COMMUNITY
Start: 2022-11-22 | End: 2023-12-04 | Stop reason: SDUPTHER

## 2023-12-04 RX ORDER — GABAPENTIN 600 MG/1
600 TABLET ORAL 3 TIMES DAILY
COMMUNITY

## 2023-12-04 ASSESSMENT — ENCOUNTER SYMPTOMS
FACIAL SWELLING: 0
SHORTNESS OF BREATH: 0
EYE PAIN: 0
VOMITING: 0
CONSTIPATION: 0
EYE REDNESS: 0
ABDOMINAL PAIN: 0
COLOR CHANGE: 0
EYE DISCHARGE: 0
BACK PAIN: 1
EYE ITCHING: 0
DIARRHEA: 0
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
## 2023-12-04 NOTE — PROGRESS NOTES
Kimber Olivares is a 61 y.o. presents today for   Chief Complaint   Patient presents with    Follow-up     Is someone accompanying this pt? No      Is the patient using any DME equipment during OV? No      Health Maintenance Due   Topic Date Due    Cervical cancer screen  Never done    Colorectal Cancer Screen  Never done    Respiratory Syncytial Virus (RSV) age 61 yrs+ (3 - 1-dose 60+ series) Never done    DTaP/Tdap/Td vaccine (4 - Td or Tdap) 07/01/2021    Flu vaccine (1) 08/01/2023    COVID-19 Vaccine (3 - 2023-24 season) 09/01/2023         Coordination of Care:   1. \"Have you been to the ER, urgent care clinic since your last visit? Hospitalized since your last visit? \" No    2. \"Have you seen or consulted any other health care providers outside of the [address] [place] [address] since your last visit? \" No     3. For patients aged 43-73: Has the patient had a colonoscopy / FIT/ Cologuard? No      If the patient is female:    4. For patients aged 43-66: Has the patient had a mammogram within the past 2 years? Yes - no Care Gap present      5. For patients aged 21-65: Has the patient had a pap smear?  Yes - no Care Gap present    Nas Dennison

## 2023-12-04 NOTE — PATIENT INSTRUCTIONS
[facility]  [address] [address] [place]  [address] [address]  Phone: [phone]

## 2024-01-14 DIAGNOSIS — J44.9 CHRONIC OBSTRUCTIVE PULMONARY DISEASE, UNSPECIFIED COPD TYPE (HCC): ICD-10-CM

## 2024-01-15 NOTE — TELEPHONE ENCOUNTER
Medication(s) requesting:   Requested Prescriptions     Pending Prescriptions Disp Refills    cetirizine (ZYRTEC) 10 MG tablet [Pharmacy Med Name: CETIRIZINE HCL 10 MG TABLET] 30 tablet 0     Sig: TOME ESTEFANI ROQUE       Last office visit:  12/4/23  Next office visit DMA: 1/19/2024

## 2024-01-17 RX ORDER — CETIRIZINE HYDROCHLORIDE 10 MG/1
TABLET ORAL
Qty: 30 TABLET | Refills: 0 | Status: SHIPPED | OUTPATIENT
Start: 2024-01-17

## 2024-01-23 DIAGNOSIS — J44.9 CHRONIC OBSTRUCTIVE PULMONARY DISEASE, UNSPECIFIED COPD TYPE (HCC): ICD-10-CM

## 2024-01-23 RX ORDER — CETIRIZINE HYDROCHLORIDE 10 MG/1
TABLET ORAL
Qty: 30 TABLET | Refills: 0 | OUTPATIENT
Start: 2024-01-23

## 2024-02-22 ENCOUNTER — OFFICE VISIT (OUTPATIENT)
Facility: CLINIC | Age: 64
End: 2024-02-22
Payer: COMMERCIAL

## 2024-02-22 VITALS
WEIGHT: 148.2 LBS | TEMPERATURE: 97.9 F | SYSTOLIC BLOOD PRESSURE: 116 MMHG | BODY MASS INDEX: 27.27 KG/M2 | HEART RATE: 75 BPM | HEIGHT: 62 IN | DIASTOLIC BLOOD PRESSURE: 70 MMHG | OXYGEN SATURATION: 100 % | RESPIRATION RATE: 14 BRPM
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
## 2024-02-22 DIAGNOSIS — I10 PRIMARY HYPERTENSION: ICD-10-CM

## 2024-02-22 DIAGNOSIS — M25.551 RIGHT HIP PAIN: ICD-10-CM

## 2024-02-22 DIAGNOSIS — M06.09 RHEUMATOID ARTHRITIS OF MULTIPLE SITES WITH NEGATIVE RHEUMATOID FACTOR (HCC): Primary | ICD-10-CM

## 2024-02-22 PROCEDURE — 3078F DIAST BP <80 MM HG: CPT | Performed by: STUDENT IN AN ORGANIZED HEALTH CARE EDUCATION/TRAINING PROGRAM

## 2024-02-22 PROCEDURE — 99214 OFFICE O/P EST MOD 30 MIN: CPT | Performed by: STUDENT IN AN ORGANIZED HEALTH CARE EDUCATION/TRAINING PROGRAM

## 2024-02-22 PROCEDURE — 96372 THER/PROPH/DIAG INJ SC/IM: CPT | Performed by: STUDENT IN AN ORGANIZED HEALTH CARE EDUCATION/TRAINING PROGRAM

## 2024-02-22 PROCEDURE — 3074F SYST BP LT 130 MM HG: CPT | Performed by: STUDENT IN AN ORGANIZED HEALTH CARE EDUCATION/TRAINING PROGRAM

## 2024-02-22 RX ORDER — HYDROXYCHLOROQUINE SULFATE 200 MG/1
400 TABLET, FILM COATED ORAL DAILY
Qty: 60 TABLET | Refills: 1 | Status: SHIPPED | OUTPATIENT
Start: 2024-02-22

## 2024-02-22 RX ORDER — LOSARTAN POTASSIUM 50 MG/1
50 TABLET ORAL DAILY
Qty: 90 TABLET | Refills: 1 | Status: SHIPPED | OUTPATIENT
Start: 2024-02-22

## 2024-02-22 RX ORDER — DICLOFENAC EPOLAMINE 0.01 G/1
1 SYSTEM TOPICAL 2 TIMES DAILY
Qty: 60 PATCH | Refills: 2 | Status: SHIPPED | OUTPATIENT
Start: 2024-02-22

## 2024-02-22 RX ORDER — KETOROLAC TROMETHAMINE 30 MG/ML
30 INJECTION, SOLUTION INTRAMUSCULAR; INTRAVENOUS ONCE
Status: COMPLETED | OUTPATIENT
Start: 2024-02-22 | End: 2024-02-22

## 2024-02-22 RX ADMIN — KETOROLAC TROMETHAMINE 30 MG: 30 INJECTION, SOLUTION INTRAMUSCULAR; INTRAVENOUS at 16:14

## 2024-02-22 ASSESSMENT — ENCOUNTER SYMPTOMS
FACIAL SWELLING: 0
EYE PAIN: 0
ABDOMINAL PAIN: 0
EYE DISCHARGE: 0
DIARRHEA: 0
BACK PAIN: 1
EYE REDNESS: 0
COLOR CHANGE: 0
VOMITING: 0
CONSTIPATION: 0
SHORTNESS OF BREATH: 0
EYE ITCHING: 0

## 2024-02-22 NOTE — PROGRESS NOTES
Angeles Denise is a 63 y.o. year old female who presents today for   Chief Complaint   Patient presents with    Follow-up       Is someone accompanying this pt? Yes    Is the patient using any DME equipment during OV? No     Depression Screenin/13/2023     2:39 PM   PHQ-9 Questionaire   Little interest or pleasure in doing things 1   Feeling down, depressed, or hopeless 1   Trouble falling or staying asleep, or sleeping too much 0   Feeling tired or having little energy 0   Poor appetite or overeating 0   Feeling bad about yourself - or that you are a failure or have let yourself or your family down 0   Trouble concentrating on things, such as reading the newspaper or watching television 0   Moving or speaking so slowly that other people could have noticed. Or the opposite - being so fidgety or restless that you have been moving around a lot more than usual 1   Thoughts that you would be better off dead, or of hurting yourself in some way 0   PHQ-9 Total Score 3   If you checked off any problems, how difficult have these problems made it for you to do your work, take care of things at home, or get along with other people? 1       Abuse Screenin/13/2023     2:00 PM   AMB Abuse Screening   Do you ever feel afraid of your partner? N   Are you in a relationship with someone who physically or mentally threatens you? N   Is it safe for you to go home? Y       Learning Assessment:  No question data found.    Fall Risk:       No data to display                    Coordination of Care:   1. \"Have you been to the ER, urgent care clinic since your last visit?  Hospitalized since your last visit?\" Yes, right side pain     2. \"Have you seen or consulted any other health care providers outside of the [facility] since your last visit?\" No     3. For patients aged 45-75: Has the patient had a colonoscopy / FIT/ Cologuard? Due     If the patient is female:    4. For patients aged 40-74: Has the 
  Height: 1.575 m (5' 2\")         Physical Exam  Vitals reviewed.   Constitutional:       Appearance: She is normal weight.   HENT:      Head: Normocephalic.      Nose: No congestion or rhinorrhea.   Eyes:      General: No scleral icterus.        Right eye: No discharge.         Left eye: No discharge.   Cardiovascular:      Rate and Rhythm: Normal rate and regular rhythm.   Pulmonary:      Effort: Pulmonary effort is normal.      Breath sounds: Normal breath sounds.   Abdominal:      General: Abdomen is flat.      Palpations: Abdomen is soft.   Musculoskeletal:         General: Normal range of motion.      Cervical back: Normal range of motion and neck supple. No rigidity.   Skin:     General: Skin is warm and dry.   Neurological:      Mental Status: She is alert and oriented to person, place, and time.      Gait: Gait normal.   Psychiatric:         Mood and Affect: Mood normal.         Behavior: Behavior normal.         Thought Content: Thought content normal.         Judgment: Judgment normal.           LABS     TESTS      Assessment/Plan:    1. Rheumatoid arthritis of multiple sites with negative rheumatoid factor (HCC)  - ketorolac (TORADOL) injection 30 mg  - diclofenac (FLECTOR) 1.3 % PTCH patch; Place 1 patch onto the skin 2 times daily  Dispense: 60 patch; Refill: 2  - hydroxychloroquine (PLAQUENIL) 200 MG tablet; Take 2 tablets by mouth daily  Dispense: 60 tablet; Refill: 1  - CBC; Future  - Comprehensive Metabolic Panel; Future    2. Primary hypertension  stable  - losartan (COZAAR) 50 MG tablet; Take 1 tablet by mouth daily  Dispense: 90 tablet; Refill: 1    3. Fall, subsequent encounter  - ketorolac (TORADOL) injection 30 mg  - diclofenac (FLECTOR) 1.3 % PTCH patch; Place 1 patch onto the skin 2 times daily  Dispense: 60 patch; Refill: 2    4. Right hip pain  - ketorolac (TORADOL) injection 30 mg  - diclofenac (FLECTOR) 1.3 % PTCH patch; Place 1 patch onto the skin 2 times daily  Dispense: 60 patch;

## 2024-02-23 ENCOUNTER — TELEPHONE (OUTPATIENT)
Facility: CLINIC | Age: 64
End: 2024-02-23

## 2024-02-23 NOTE — TELEPHONE ENCOUNTER
----- Message from Vianney Pan sent at 2/23/2024 11:40 AM EST -----  Subject: Medication Problem     Medication: diclofenac (FLECTOR) 1.3 % PTCH patch  Dosage: 1 patch twice daily  Ordering Provider:     Question/Problem: Patient called regarding attached medication and all of   the other medications that were prescribed at yesterday's appointment. The   insurance is not covering any of them. Patient would like return call to   see what is going on and if the medication can be changed.      Pharmacy: [facility]/PH[facility]ACY #[id number] - [address] -    [phone] - F [phone]    ---------------------------------------------------------------------------  --------------  CALL BACK INFO  [phone]; OK to leave message on voicemail  ---------------------------------------------------------------------------  --------------    SCRIPT ANSWERS  Relationship to Patient: Self

## 2024-02-23 NOTE — TELEPHONE ENCOUNTER
Per pharmacist, patches need PA. Plaquenil is ready for pickup, and losartan is too early to fill (90 day supply picked up on dec 22)    PA for patches initiated via cover my meds. Awaiting approval or denial.

## 2024-02-26 ENCOUNTER — TELEPHONE (OUTPATIENT)
Facility: CLINIC | Age: 64
End: 2024-02-26

## 2024-02-26 NOTE — TELEPHONE ENCOUNTER
Received fax from [facility] stating PA denied for patches. Alternative drugs include Diclofenac sodium 1%, Diclofenac 3% or Diclofenac sod ec 75 mg tab. Please advise.

## 2024-02-26 NOTE — TELEPHONE ENCOUNTER
Patient called with an  on the line and stated she was informed the PA for patches was denied.    States she really needs this medication and would like to know what the next step would be?    Please follow-up with patient to advise.  Thank you.

## 2024-02-29 NOTE — TELEPHONE ENCOUNTER
Pt states she will call her insurance bc there seems to be some issues w/ her . Will call when her insurance is arranged.

## 2024-06-07 ENCOUNTER — OFFICE VISIT (OUTPATIENT)
Facility: CLINIC | Age: 64
End: 2024-06-07

## 2024-06-07 VITALS
HEIGHT: 62 IN | RESPIRATION RATE: 14 BRPM | SYSTOLIC BLOOD PRESSURE: 95 MMHG | OXYGEN SATURATION: 97 % | BODY MASS INDEX: 26.91 KG/M2 | WEIGHT: 146.2 LBS | DIASTOLIC BLOOD PRESSURE: 61 MMHG | TEMPERATURE: 97.2 F | HEART RATE: 78 BPM

## 2024-06-07 DIAGNOSIS — Z09 HOSPITAL DISCHARGE FOLLOW-UP: Primary | ICD-10-CM

## 2024-06-07 DIAGNOSIS — Z87.448 HISTORY OF CYSTOCELE: ICD-10-CM

## 2024-06-07 DIAGNOSIS — J98.4 RESTRICTIVE LUNG DISEASE: ICD-10-CM

## 2024-06-07 DIAGNOSIS — I10 PRIMARY HYPERTENSION: ICD-10-CM

## 2024-06-07 DIAGNOSIS — J44.9 CHRONIC OBSTRUCTIVE PULMONARY DISEASE, UNSPECIFIED COPD TYPE (HCC): ICD-10-CM

## 2024-06-07 DIAGNOSIS — N30.01 ACUTE CYSTITIS WITH HEMATURIA: ICD-10-CM

## 2024-06-07 RX ORDER — FLUTICASONE PROPIONATE AND SALMETEROL XINAFOATE 115; 21 UG/1; UG/1
2 AEROSOL, METERED RESPIRATORY (INHALATION) 2 TIMES DAILY
Qty: 12 G | Refills: 3 | Status: SHIPPED | OUTPATIENT
Start: 2024-06-07

## 2024-06-07 RX ORDER — LOSARTAN POTASSIUM 50 MG/1
50 TABLET ORAL DAILY
Qty: 30 TABLET | Refills: 1 | Status: SHIPPED | OUTPATIENT
Start: 2024-06-07

## 2024-06-07 RX ORDER — ALBUTEROL SULFATE 90 UG/1
2 AEROSOL, METERED RESPIRATORY (INHALATION) EVERY 6 HOURS PRN
Qty: 18 G | Refills: 3 | Status: SHIPPED | OUTPATIENT
Start: 2024-06-07

## 2024-06-07 RX ORDER — FLUCONAZOLE 150 MG/1
150 TABLET ORAL ONCE
Qty: 1 TABLET | Refills: 0 | Status: SHIPPED | OUTPATIENT
Start: 2024-06-07 | End: 2024-06-07

## 2024-06-07 RX ORDER — NITROFURANTOIN 25; 75 MG/1; MG/1
100 CAPSULE ORAL 2 TIMES DAILY
Qty: 20 CAPSULE | Refills: 0 | Status: SHIPPED | OUTPATIENT
Start: 2024-06-07 | End: 2024-06-17

## 2024-06-07 ASSESSMENT — PATIENT HEALTH QUESTIONNAIRE - PHQ9
SUM OF ALL RESPONSES TO PHQ9 QUESTIONS 1 & 2: 1
3. TROUBLE FALLING OR STAYING ASLEEP: NOT AT ALL
2. FEELING DOWN, DEPRESSED OR HOPELESS: SEVERAL DAYS
8. MOVING OR SPEAKING SO SLOWLY THAT OTHER PEOPLE COULD HAVE NOTICED. OR THE OPPOSITE, BEING SO FIGETY OR RESTLESS THAT YOU HAVE BEEN MOVING AROUND A LOT MORE THAN USUAL: NOT AT ALL
SUM OF ALL RESPONSES TO PHQ QUESTIONS 1-9: 1
1. LITTLE INTEREST OR PLEASURE IN DOING THINGS: NOT AT ALL
6. FEELING BAD ABOUT YOURSELF - OR THAT YOU ARE A FAILURE OR HAVE LET YOURSELF OR YOUR FAMILY DOWN: NOT AT ALL
SUM OF ALL RESPONSES TO PHQ QUESTIONS 1-9: 1
SUM OF ALL RESPONSES TO PHQ QUESTIONS 1-9: 1
9. THOUGHTS THAT YOU WOULD BE BETTER OFF DEAD, OR OF HURTING YOURSELF: NOT AT ALL
7. TROUBLE CONCENTRATING ON THINGS, SUCH AS READING THE NEWSPAPER OR WATCHING TELEVISION: NOT AT ALL
4. FEELING TIRED OR HAVING LITTLE ENERGY: NOT AT ALL
5. POOR APPETITE OR OVEREATING: NOT AT ALL
SUM OF ALL RESPONSES TO PHQ QUESTIONS 1-9: 1

## 2024-06-07 ASSESSMENT — ENCOUNTER SYMPTOMS
EYE DISCHARGE: 0
VOMITING: 0
COLOR CHANGE: 0
SHORTNESS OF BREATH: 0
BACK PAIN: 0
ABDOMINAL PAIN: 0
DIARRHEA: 0
EYE ITCHING: 0
CONSTIPATION: 0
FACIAL SWELLING: 0
EYE PAIN: 0
EYE REDNESS: 0

## 2024-06-07 NOTE — PROGRESS NOTES
Angeles Denise is a 63 y.o. year old female who presents today for   Chief Complaint   Patient presents with    Hypertension    Follow-up       Is someone accompanying this pt? No    Is the patient using any DME equipment during OV? No    Depression Screenin/7/2024     8:14 AM 2023     2:39 PM   PHQ-9 Questionaire   Little interest or pleasure in doing things 0 1   Feeling down, depressed, or hopeless 1 1   Trouble falling or staying asleep, or sleeping too much 0 0   Feeling tired or having little energy 0 0   Poor appetite or overeating 0 0   Feeling bad about yourself - or that you are a failure or have let yourself or your family down 0 0   Trouble concentrating on things, such as reading the newspaper or watching television 0 0   Moving or speaking so slowly that other people could have noticed. Or the opposite - being so fidgety or restless that you have been moving around a lot more than usual 0 1   Thoughts that you would be better off dead, or of hurting yourself in some way 0 0   PHQ-9 Total Score 1 3   If you checked off any problems, how difficult have these problems made it for you to do your work, take care of things at home, or get along with other people?  1       Abuse Screenin/13/2023     2:00 PM   AMB Abuse Screening   Do you ever feel afraid of your partner? N   Are you in a relationship with someone who physically or mentally threatens you? N   Is it safe for you to go home? Y       Learning Assessment:  No question data found.    Fall Risk:       No data to display                    Coordination of Care:   1. \"Have you been to the ER, urgent care clinic since your last visit?  Hospitalized since your last visit?\" Yes, 2024    2. \"Have you seen or consulted any other health care providers outside of the [facility] since your last visit?\" No    3. For patients aged 45-75: Has the patient had a colonoscopy / FIT/ Cologuard? Due    If the patient is 
03/01/2023   • UROLOGICAL SURGERY  05/13/2024    CYSTOSCOPY, ANTERIOR COLPORRHAPHY WITH POSSIBLE SACROSPINOUS LIGAMENT FIXATION;     CECE ROONEY MD      Family History   Problem Relation Age of Onset   • Leukemia Mother    • Cancer Sister         bone cancer unknown   • Breast Cancer Other      Social History     Socioeconomic History   • Marital status: Single     Spouse name: Not on file   • Number of children: Not on file   • Years of education: Not on file   • Highest education level: Not on file   Occupational History   • Not on file   Tobacco Use   • Smoking status: Never     Passive exposure: Past   • Smokeless tobacco: Never   Vaping Use   • Vaping Use: Never used   Substance and Sexual Activity   • Alcohol use: No   • Drug use: No   • Sexual activity: Not on file   Other Topics Concern   • Not on file   Social History Narrative   • Not on file     Social Determinants of Health     Financial Resource Strain: Low Risk  (11/14/2023)    Overall Financial Resource Strain (CARDIA)    • Difficulty of Paying Living Expenses: Not hard at all   Food Insecurity: Not on file (11/14/2023)   Transportation Needs: Unknown (11/14/2023)    PRAPARE - Transportation    • Lack of Transportation (Medical): Not on file    • Lack of Transportation (Non-Medical): No   Physical Activity: Not on file   Stress: Not on file   Social Connections: Not on file   Intimate Partner Violence: Not on file   Housing Stability: Unknown (11/14/2023)    Housing Stability Vital Sign    • Unable to Pay for Housing in the Last Year: Not on file    • Number of Places Lived in the Last Year: Not on file    • Unstable Housing in the Last Year: No        Current Outpatient Medications   Medication Sig Dispense Refill   • vitamin B-12 (CYANOCOBALAMIN) 1000 MCG tablet TOME ESTEFANI TABLETA LAISHA LOS D AS     • HUMIRA, 2 PEN, 40 MG/0.4ML PNKT Inject 40 mg into the skin every 14 days     • morphine (MS CONTIN) 30 MG extended release tablet TOME ESTEFANI

## 2024-08-01 ENCOUNTER — OFFICE VISIT (OUTPATIENT)
Facility: CLINIC | Age: 64
End: 2024-08-01

## 2024-08-01 VITALS
HEIGHT: 62 IN | OXYGEN SATURATION: 98 % | BODY MASS INDEX: 26.35 KG/M2 | TEMPERATURE: 97.8 F | RESPIRATION RATE: 14 BRPM | DIASTOLIC BLOOD PRESSURE: 57 MMHG | HEART RATE: 87 BPM | WEIGHT: 143.2 LBS | SYSTOLIC BLOOD PRESSURE: 87 MMHG

## 2024-08-01 DIAGNOSIS — J98.4 RESTRICTIVE LUNG DISEASE: ICD-10-CM

## 2024-08-01 DIAGNOSIS — H53.8 BLURRY VISION: ICD-10-CM

## 2024-08-01 DIAGNOSIS — M06.09 RHEUMATOID ARTHRITIS OF MULTIPLE SITES WITH NEGATIVE RHEUMATOID FACTOR (HCC): ICD-10-CM

## 2024-08-01 DIAGNOSIS — I10 PRIMARY HYPERTENSION: ICD-10-CM

## 2024-08-01 DIAGNOSIS — Z00.00 MEDICARE ANNUAL WELLNESS VISIT, SUBSEQUENT: Primary | ICD-10-CM

## 2024-08-01 RX ORDER — LOSARTAN POTASSIUM 25 MG/1
25 TABLET ORAL DAILY
Qty: 90 TABLET | Refills: 1 | Status: SHIPPED | OUTPATIENT
Start: 2024-08-01

## 2024-08-01 ASSESSMENT — PATIENT HEALTH QUESTIONNAIRE - PHQ9
SUM OF ALL RESPONSES TO PHQ9 QUESTIONS 1 & 2: 0
SUM OF ALL RESPONSES TO PHQ QUESTIONS 1-9: 0
6. FEELING BAD ABOUT YOURSELF - OR THAT YOU ARE A FAILURE OR HAVE LET YOURSELF OR YOUR FAMILY DOWN: NOT AT ALL
1. LITTLE INTEREST OR PLEASURE IN DOING THINGS: NOT AT ALL
5. POOR APPETITE OR OVEREATING: NOT AT ALL
4. FEELING TIRED OR HAVING LITTLE ENERGY: NOT AT ALL
8. MOVING OR SPEAKING SO SLOWLY THAT OTHER PEOPLE COULD HAVE NOTICED. OR THE OPPOSITE, BEING SO FIGETY OR RESTLESS THAT YOU HAVE BEEN MOVING AROUND A LOT MORE THAN USUAL: NOT AT ALL
SUM OF ALL RESPONSES TO PHQ QUESTIONS 1-9: 0
SUM OF ALL RESPONSES TO PHQ QUESTIONS 1-9: 0
7. TROUBLE CONCENTRATING ON THINGS, SUCH AS READING THE NEWSPAPER OR WATCHING TELEVISION: NOT AT ALL
2. FEELING DOWN, DEPRESSED OR HOPELESS: NOT AT ALL
9. THOUGHTS THAT YOU WOULD BE BETTER OFF DEAD, OR OF HURTING YOURSELF: NOT AT ALL
10. IF YOU CHECKED OFF ANY PROBLEMS, HOW DIFFICULT HAVE THESE PROBLEMS MADE IT FOR YOU TO DO YOUR WORK, TAKE CARE OF THINGS AT HOME, OR GET ALONG WITH OTHER PEOPLE: NOT DIFFICULT AT ALL
SUM OF ALL RESPONSES TO PHQ QUESTIONS 1-9: 0
3. TROUBLE FALLING OR STAYING ASLEEP: NOT AT ALL

## 2024-08-01 ASSESSMENT — LIFESTYLE VARIABLES
HOW OFTEN DO YOU HAVE A DRINK CONTAINING ALCOHOL: NEVER
HOW MANY STANDARD DRINKS CONTAINING ALCOHOL DO YOU HAVE ON A TYPICAL DAY: PATIENT DOES NOT DRINK

## 2024-08-01 NOTE — PROGRESS NOTES
Angeles Denise is a 63 y.o. year old female who presents today for   Chief Complaint   Patient presents with    Medicare AWV    Other     4 wk f/u         Is someone accompanying this pt? No    Is the patient using any DME equipment during OV? No    Depression Screenin/7/2024     8:14 AM 2023     2:39 PM   PHQ-9 Questionaire   Little interest or pleasure in doing things 0 1   Feeling down, depressed, or hopeless 1 1   Trouble falling or staying asleep, or sleeping too much 0 0   Feeling tired or having little energy 0 0   Poor appetite or overeating 0 0   Feeling bad about yourself - or that you are a failure or have let yourself or your family down 0 0   Trouble concentrating on things, such as reading the newspaper or watching television 0 0   Moving or speaking so slowly that other people could have noticed. Or the opposite - being so fidgety or restless that you have been moving around a lot more than usual 0 1   Thoughts that you would be better off dead, or of hurting yourself in some way 0 0   PHQ-9 Total Score 1 3   If you checked off any problems, how difficult have these problems made it for you to do your work, take care of things at home, or get along with other people?  1       Abuse Screenin/13/2023     2:00 PM   AMB Abuse Screening   Do you ever feel afraid of your partner? N   Are you in a relationship with someone who physically or mentally threatens you? N   Is it safe for you to go home? Y       Learning Assessment:  No question data found.    Fall Risk:       No data to display                    Coordination of Care:   1. \"Have you been to the ER, urgent care clinic since your last visit?  Hospitalized since your last visit?\" No    2. \"Have you seen or consulted any other health care providers outside of the [facility] since your last visit?\" No    3. For patients aged 45-75: Has the patient had a colonoscopy / FIT/ Cologuard? Due    If the patient is 
  HENT:      Head: Normocephalic.      Nose: No congestion or rhinorrhea.   Eyes:      General: No scleral icterus.        Right eye: No discharge.         Left eye: No discharge.   Cardiovascular:      Rate and Rhythm: Normal rate and regular rhythm.   Pulmonary:      Effort: Pulmonary effort is normal.      Breath sounds: Normal breath sounds.   Abdominal:      General: Abdomen is flat.      Palpations: Abdomen is soft.   Musculoskeletal:         General: Normal range of motion.      Cervical back: Normal range of motion and neck supple. No rigidity.   Skin:     General: Skin is warm and dry.   Neurological:      Mental Status: She is alert and oriented to person, place, and time.      Gait: Gait normal.   Psychiatric:         Mood and Affect: Mood normal.         Behavior: Behavior normal.         Thought Content: Thought content normal.         Judgment: Judgment normal.           LABS     TESTS      Assessment/Plan:    1. Medicare annual wellness visit, subsequent  See above    2. Primary hypertension  Reduced the Losartan by half  - losartan (COZAAR) 25 MG tablet; Take 1 tablet by mouth daily  Dispense: 90 tablet; Refill: 1    3. Blurry vision  - External Referral To Ophthalmology    4. Rheumatoid arthritis of multiple sites with negative rheumatoid factor (HCC)  F/up w/ rheumatology  - External Referral To Ophthalmology    5. Restrictive lung disease  F/up w/ pulmonary    Lab review: no lab studies available for review at time of visit    On this date 8/1/2024 I have spent 30 minutes reviewing previous notes, test results and face to face with the patient discussing the diagnosis and importance of compliance with the treatment plan as well as documenting on the day of the visit.    I have discussed the diagnosis with the patient and the intended plan as seen in the above orders.  The patient has received an after-visit summary and questions were answered concerning future plans.  I have discussed medication

## 2024-08-02 ASSESSMENT — ENCOUNTER SYMPTOMS
EYE ITCHING: 0
SHORTNESS OF BREATH: 0
BACK PAIN: 0
COLOR CHANGE: 0
CONSTIPATION: 0
VOMITING: 0
FACIAL SWELLING: 0
ABDOMINAL PAIN: 0
EYE DISCHARGE: 0
DIARRHEA: 0
EYE PAIN: 0
EYE REDNESS: 0

## 2024-08-04 DIAGNOSIS — J44.9 CHRONIC OBSTRUCTIVE PULMONARY DISEASE, UNSPECIFIED COPD TYPE (HCC): ICD-10-CM

## 2024-08-05 RX ORDER — MONTELUKAST SODIUM 10 MG/1
TABLET ORAL
Qty: 90 TABLET | Refills: 1 | Status: SHIPPED | OUTPATIENT
Start: 2024-08-05

## 2024-08-05 NOTE — TELEPHONE ENCOUNTER
Medication(s) requesting:   Requested Prescriptions     Pending Prescriptions Disp Refills    montelukast (SINGULAIR) 10 MG tablet [Pharmacy Med Name: MONTELUKAST SOD 10 MG TABLET] 90 tablet 1     Sig: YEE ESTEFANI ROQUE       Last office visit:  08/01/2024  Next office visit DMA: 8/19/2024

## 2024-08-19 ENCOUNTER — OFFICE VISIT (OUTPATIENT)
Facility: CLINIC | Age: 64
End: 2024-08-19

## 2024-08-19 VITALS — SYSTOLIC BLOOD PRESSURE: 131 MMHG | OXYGEN SATURATION: 100 % | HEART RATE: 88 BPM | DIASTOLIC BLOOD PRESSURE: 76 MMHG

## 2024-08-19 DIAGNOSIS — I10 HYPERTENSION, UNSPECIFIED TYPE: Primary | ICD-10-CM

## 2024-08-19 NOTE — PROGRESS NOTES
Angeles Denise is being seen today for a Blood Pressure Recheck.     Angeles Denise was seen 8/1/2024 and her Blood Pressure was:   BP Readings from Last 1 Encounters:   08/19/24 131/76           Yaneth Venegas

## 2024-09-01 DIAGNOSIS — M06.09 RHEUMATOID ARTHRITIS OF MULTIPLE SITES WITH NEGATIVE RHEUMATOID FACTOR (HCC): ICD-10-CM

## 2024-09-03 RX ORDER — HYDROXYCHLOROQUINE SULFATE 200 MG/1
TABLET, FILM COATED ORAL
Qty: 90 TABLET | Refills: 1 | Status: SHIPPED | OUTPATIENT
Start: 2024-09-03

## 2024-09-03 NOTE — TELEPHONE ENCOUNTER
Medication(s) requesting:   Requested Prescriptions     Pending Prescriptions Disp Refills    hydroxychloroquine (PLAQUENIL) 200 MG tablet [Pharmacy Med Name: HYDROXYCHLOROQUINE 200 MG TAB] 90 tablet 1     Sig: YEE ESTEFANI TABLETA TONERIS ROQUE       Last office visit:  08/01/2024  Next office visit DMA: 11/5/2024

## 2024-10-16 ENCOUNTER — HOSPITAL ENCOUNTER (OUTPATIENT)
Facility: HOSPITAL | Age: 64
Setting detail: RECURRING SERIES
Discharge: HOME | End: 2024-10-19
Payer: COMMERCIAL

## 2024-10-16 PROCEDURE — 97112 NEUROMUSCULAR REEDUCATION: CPT

## 2024-10-16 PROCEDURE — 97161 PT EVAL LOW COMPLEX 20 MIN: CPT
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
## 2024-10-31 ENCOUNTER — HOSPITAL ENCOUNTER (OUTPATIENT)
Facility: HOSPITAL | Age: 64
Setting detail: RECURRING SERIES
Discharge: HOME | End: 2024-11-03
Payer: COMMERCIAL

## 2024-10-31 PROCEDURE — 97110 THERAPEUTIC EXERCISES: CPT

## 2024-10-31 PROCEDURE — 97535 SELF CARE MNGMENT TRAINING: CPT

## 2024-10-31 NOTE — PROGRESS NOTES
PHYSICAL / OCCUPATIONAL THERAPY - DAILY TREATMENT NOTE (updated )    Patient Name: Angeles Denise    Date: 10/31/2024    : 1960  Insurance: Payor: ENA / Plan: E[employer] INDIVIDUAL AND FAMILY PLANS / Product Type: *No Product type* /      Patient  verified Yes     Visit #   Current / Total 2 16   Time   In / Out 11:20 12   Pain   In / Out 0 0   Subjective Functional Status/Changes: The pain and the burning decreased but the frequency is there if she doesn't go fast.      TREATMENT AREA =  Pelvic and perineal pain [R10.2]    OBJECTIVE         Therapeutic Procedures:    Tx Min Billable or 1:1 Min (if diff from Tx Min) Procedure, Rationale, Specifics   25  30787 Self Care/Home Management (timed):  improve patient knowledge and understanding of bladder education, urge suppression techniques  to improve patient's ability to progress to PLOF and address remaining functional goals.  (see flow sheet as applicable)     Details if applicable:       15  48542 Therapeutic Exercise (timed):  increase ROM, strength, coordination, balance, and proprioception to improve patient's ability to progress to PLOF and address remaining functional goals. (see flow sheet as applicable)     Details if applicable:            Details if applicable:            Details if applicable:            Details if applicable:     40  [facility] Totals Reminder: bill using total billable min of TIMED therapeutic procedures (example: do not include dry needle or estim unattended, both untimed codes, in totals to left)  8-22 min = 1 unit; 23-37 min = 2 units; 38-52 min = 3 units; 53-67 min = 4 units; 68-82 min = 5 units   Total Total     [x]  Patient Education billed concurrently with other procedures   [x] Review HEP    [] Progressed/Changed HEP, detail:    [] Other detail:       Objective Information/Functional Measures/Assessment    Pt tolerated treatment well today, edu extensively on good bladder habits and given stretches to help relax pelvic  activities as tolerated  []  Discharge due to :  []  Other:    Kassandra Win, PT    10/31/2024    8:27 AM    Future Appointments   Date Time Provider Department Center   10/31/2024 11:20 AM Kassandra Win, PT [facility]   11/5/2024  3:20 PM Blank Villagomez MD [facility]   11/7/2024 11:20 AM Kassandra Win PT [facility]   11/14/2024 11:20 AM Kassandra Wni, PT [facility]   11/21/2024 11:20 AM Kassandra Win PT [facility]   11/27/2024 11:20 AM Kassandra Win, PT [facility]   2/17/2025  2:20 PM Lizet Poon PA [facility] Suzan Maravilla       If an interpreting service was utilized for treatment of this patient, the contents of this document represent the material reviewed with the patient via the .

## 2024-11-05 ENCOUNTER — OFFICE VISIT (OUTPATIENT)
Facility: CLINIC | Age: 64
End: 2024-11-05
Payer: COMMERCIAL

## 2024-11-05 ENCOUNTER — HOSPITAL ENCOUNTER (OUTPATIENT)
Facility: HOSPITAL | Age: 64
Setting detail: RECURRING SERIES
Discharge: HOME | End: 2024-11-08
Payer: COMMERCIAL

## 2024-11-05 VITALS
RESPIRATION RATE: 14 BRPM | HEART RATE: 76 BPM | SYSTOLIC BLOOD PRESSURE: 121 MMHG | BODY MASS INDEX: 27.42 KG/M2 | TEMPERATURE: 97.3 F | OXYGEN SATURATION: 98 % | WEIGHT: 149 LBS | DIASTOLIC BLOOD PRESSURE: 74 MMHG | HEIGHT: 62 IN

## 2024-11-05 DIAGNOSIS — J44.9 CHRONIC OBSTRUCTIVE PULMONARY DISEASE, UNSPECIFIED COPD TYPE (HCC): ICD-10-CM

## 2024-11-05 DIAGNOSIS — M06.09 RHEUMATOID ARTHRITIS OF MULTIPLE SITES WITH NEGATIVE RHEUMATOID FACTOR (HCC): ICD-10-CM

## 2024-11-05 DIAGNOSIS — D50.9 IRON DEFICIENCY ANEMIA, UNSPECIFIED IRON DEFICIENCY ANEMIA TYPE: ICD-10-CM

## 2024-11-05 DIAGNOSIS — I10 PRIMARY HYPERTENSION: Primary | ICD-10-CM

## 2024-11-05 PROCEDURE — 97535 SELF CARE MNGMENT TRAINING: CPT

## 2024-11-05 PROCEDURE — 97112 NEUROMUSCULAR REEDUCATION: CPT

## 2024-11-05 PROCEDURE — 97530 THERAPEUTIC ACTIVITIES: CPT

## 2024-11-05 PROCEDURE — 3078F DIAST BP <80 MM HG: CPT | Performed by: STUDENT IN AN ORGANIZED HEALTH CARE EDUCATION/TRAINING PROGRAM

## 2024-11-05 PROCEDURE — 3074F SYST BP LT 130 MM HG: CPT | Performed by: STUDENT IN AN ORGANIZED HEALTH CARE EDUCATION/TRAINING PROGRAM

## 2024-11-05 PROCEDURE — 99214 OFFICE O/P EST MOD 30 MIN: CPT | Performed by: STUDENT IN AN ORGANIZED HEALTH CARE EDUCATION/TRAINING PROGRAM

## 2024-11-05 NOTE — PROGRESS NOTES
Angeles Denise is a 64 y.o. year old female who presents today for   Chief Complaint   Patient presents with    Hypertension     3 month f/u        \"Have you been to the ER, urgent care clinic since your last visit?  Hospitalized since your last visit?\"   NO     “Have you seen or consulted any other health care providers outside our system since your last visit?”   YES - When: approximately 2  weeks ago.  Where and Why: UROLOGY.     Have you had a mammogram?”   NO    Date of last Mammogram: 8/23/2022      “Have you had a pap smear?”    NO    No cervical cancer screening on file       “Have you had a colorectal cancer screening such as a colonoscopy/FIT/Cologuard?    NO    No colonoscopy on file  No cologuard on file  No FIT/FOBT on file   No flexible sigmoidoscopy on file           - TAINA Lopez  [facility]  Phone: [phone]  Fax: [phone]  
abdominal pain, constipation, diarrhea and vomiting.   Genitourinary:  Negative for difficulty urinating, frequency, hematuria and urgency.   Musculoskeletal:  Negative for arthralgias, back pain, myalgias and neck pain.   Skin:  Negative for color change.   Neurological:  Negative for dizziness, seizures, numbness and headaches.   Psychiatric/Behavioral:  Negative for agitation, behavioral problems, decreased concentration, sleep disturbance and suicidal ideas.              Objective:  Vitals:    11/05/24 1510   BP: 121/74   Site: Left Upper Arm   Position: Sitting   Cuff Size: Small Adult   Pulse: 76   Resp: 14   Temp: 97.3 °F (36.3 °C)   TempSrc: Temporal   SpO2: 98%   Weight: 67.6 kg (149 lb)   Height: 1.575 m (5' 2\")         Physical Exam  Vitals reviewed.   Constitutional:       Appearance: She is normal weight.   HENT:      Head: Normocephalic.      Nose: No congestion or rhinorrhea.   Eyes:      General: No scleral icterus.        Right eye: No discharge.         Left eye: No discharge.   Cardiovascular:      Rate and Rhythm: Normal rate and regular rhythm.   Pulmonary:      Effort: Pulmonary effort is normal.      Breath sounds: Normal breath sounds.   Abdominal:      General: Abdomen is flat.      Palpations: Abdomen is soft.   Musculoskeletal:         General: Normal range of motion.      Cervical back: Normal range of motion and neck supple. No rigidity.   Skin:     General: Skin is warm and dry.   Neurological:      Mental Status: She is alert and oriented to person, place, and time.      Gait: Gait normal.   Psychiatric:         Mood and Affect: Mood normal.         Behavior: Behavior normal.         Thought Content: Thought content normal.         Judgment: Judgment normal.           LABS     TESTS      Assessment/Plan:    1. Primary hypertension  stable    2. Rheumatoid arthritis of multiple sites with negative rheumatoid factor (HCC)  F/up w/ rheumatology - stable    3. Chronic obstructive pulmonary

## 2024-11-05 NOTE — PATIENT INSTRUCTIONS
11/27/2024 3:00 PM EST Office Visit [facility]   [address]   [address]   [facility] [address]th [place] [address] [phone]  Kinsey Whittaker, NP   [address]   [place], [place] [id number] [phone] (Work)   [phone] (Fax)      12/03/2024 3:30 PM EST Office Visit [facility]   [address]   [address] [place] [address] [phone]  Yonathan Montero MD   [address] [facility] [address]   [place] [address] [phone] (Work)   [phone] (Fax)

## 2024-11-05 NOTE — PROGRESS NOTES
[facility]  [address] [place] [address]   Phone: [phone] Fax: [phone]  PROGRESS NOTE  Patient Name: Angeles Denise : 1960   Treatment/Medical Diagnosis: Pelvic and perineal pain [R10.2] Pelvic and perineal pain [R10.2]   Referral Source: Jose Luis Cerna, *     Date of Initial Visit: 10/16/24 Attended Visits: 3 Missed Visits: 0     SUMMARY OF TREATMENT  Patient's POC has consisted of therex, therapeutic activities, manual therapy prn, modalities prn, pt. education, and a comprehensive HEP. Treatment strategies used to address functional mobility deficits, ROM deficits, strength deficits, analyze and address soft tissue restrictions, analyze and cue movement patterns, analyze and modify body mechanics/ergonomics, assess and modify postural abnormalities and instruct in home and community integration.      CURRENT STATUS  Short term goals:   Patient will demonstrate proper dietary/fluid habits and urge suppression strategies that promote bladder and bowel health to aid in management of urinary urgency and incontinence.  Status at eval: Patient consuming 4-5 bottles of water, 1 glass of tea and juice and unaware of bladder fitness, dietary irritants and urge suppression strategies.   Current: edu on urge suppression techniques last week PROGRESSING     Long term goals:   Patient will report bladder continence 25-50% of the time with walking to the toilet.   Status at eval: patient stress and urgency incontinence 3 times  per day, soaked  Current: pt given [facility] today PROGRESSING     Patient will demonstrate improvement of current complaints evidenced by a 16 point  improvement in LAURI  Status at Eval: LAURI 71  Current: to be administered next session UNKNOWN     Patient will demonstrate independence with management tools and exercise program that are beneficial for current condition in order to feel comfortable with Pelvic

## 2024-11-05 NOTE — PROGRESS NOTES
PHYSICAL / OCCUPATIONAL THERAPY - DAILY TREATMENT NOTE (updated )    Patient Name: Angeles Denise    Date: 2024    : 1960  Insurance: Payor: ENA / Plan: ENA INDIVIDUAL AND FAMILY PLANS / Product Type: *No Product type* /      Patient  verified Yes     Visit #   Current / Total 3 16   Time   In / Out 10:40 11:20   Pain   In / Out 0 0   Subjective Functional Status/Changes: Pt has been trying to do what she is told. It is helping. She is leaking just a little bit. Right now she is not working. She is at home     TREATMENT AREA =  Pelvic and perineal pain [R10.2]    OBJECTIVE    Therapeutic Procedures:    Tx Min Billable or 1:1 Min (if diff from Tx Min) Procedure, Rationale, Specifics   10  58443 Self Care/Home Management (timed):  improve patient knowledge and understanding of pelvic floor anatomy and function as well of importance of lifting not pushing down on the bladder   to improve patient's ability to progress to PLOF and address remaining functional goals.  (see flow sheet as applicable)     Details if applicable:       15  46695 Therapeutic Activity (timed):  use of dynamic activities replicating functional movements to increase ROM, strength, coordination, balance, and proprioception in order to improve patient's ability to progress to PLOF and address remaining functional goals.  (see flow sheet as applicable)      Details if applicable:     15   03344 Neuromuscular Re-Education (timed):  improve balance, coordination, kinesthetic sense, posture, core stability and proprioception to improve patient's ability to develop conscious control of individual muscles and awareness of position of extremities in order to progress to PLOF and address remaining functional goals. (see flow sheet as applicable)      Details if applicable:            Details if applicable:            Details if applicable:     40  [facility] Totals Reminder: bill using total billable min of TIMED therapeutic procedures

## 2024-11-07 ENCOUNTER — APPOINTMENT (OUTPATIENT)
Facility: HOSPITAL | Age: 64
End: 2024-11-07
Payer: COMMERCIAL

## 2024-11-14 ENCOUNTER — APPOINTMENT (OUTPATIENT)
Facility: HOSPITAL | Age: 64
End: 2024-11-14
Payer: COMMERCIAL

## 2024-11-19 ENCOUNTER — HOSPITAL ENCOUNTER (OUTPATIENT)
Facility: HOSPITAL | Age: 64
Setting detail: SPECIMEN
Discharge: HOME | End: 2024-11-22

## 2024-11-19 ENCOUNTER — LAB (OUTPATIENT)
Facility: CLINIC | Age: 64
End: 2024-11-19

## 2024-11-19 ENCOUNTER — TELEPHONE (OUTPATIENT)
Facility: CLINIC | Age: 64
End: 2024-11-19

## 2024-11-19 ENCOUNTER — OFFICE VISIT (OUTPATIENT)
Facility: CLINIC | Age: 64
End: 2024-11-19
Payer: COMMERCIAL

## 2024-11-19 ENCOUNTER — HOSPITAL ENCOUNTER (OUTPATIENT)
Facility: HOSPITAL | Age: 64
Setting detail: RECURRING SERIES
Discharge: HOME | End: 2024-11-22
Payer: COMMERCIAL

## 2024-11-19 VITALS — SYSTOLIC BLOOD PRESSURE: 117 MMHG | HEART RATE: 83 BPM | DIASTOLIC BLOOD PRESSURE: 61 MMHG | OXYGEN SATURATION: 97 %

## 2024-11-19 DIAGNOSIS — G43.909 MIGRAINE WITHOUT STATUS MIGRAINOSUS, NOT INTRACTABLE, UNSPECIFIED MIGRAINE TYPE: ICD-10-CM

## 2024-11-19 DIAGNOSIS — R53.83 FATIGUE, UNSPECIFIED TYPE: Primary | ICD-10-CM

## 2024-11-19 DIAGNOSIS — D50.9 IRON DEFICIENCY ANEMIA, UNSPECIFIED IRON DEFICIENCY ANEMIA TYPE: ICD-10-CM

## 2024-11-19 LAB — SENTARA SPECIMEN COLLECTION: NORMAL

## 2024-11-19 PROCEDURE — 99001 SPECIMEN HANDLING PT-LAB: CPT

## 2024-11-19 PROCEDURE — 97530 THERAPEUTIC ACTIVITIES: CPT

## 2024-11-19 PROCEDURE — 96372 THER/PROPH/DIAG INJ SC/IM: CPT | Performed by: STUDENT IN AN ORGANIZED HEALTH CARE EDUCATION/TRAINING PROGRAM

## 2024-11-19 PROCEDURE — 97110 THERAPEUTIC EXERCISES: CPT

## 2024-11-19 PROCEDURE — 97112 NEUROMUSCULAR REEDUCATION: CPT

## 2024-11-19 PROCEDURE — 99214 OFFICE O/P EST MOD 30 MIN: CPT | Performed by: STUDENT IN AN ORGANIZED HEALTH CARE EDUCATION/TRAINING PROGRAM

## 2024-11-19 RX ORDER — CYANOCOBALAMIN 1000 UG/ML
1000 INJECTION, SOLUTION INTRAMUSCULAR; SUBCUTANEOUS ONCE
Status: COMPLETED | OUTPATIENT
Start: 2024-11-19 | End: 2024-11-19

## 2024-11-19 RX ORDER — RIZATRIPTAN BENZOATE 10 MG/1
10 TABLET ORAL
Qty: 12 TABLET | Refills: 5 | Status: SHIPPED | OUTPATIENT
Start: 2024-11-19 | End: 2024-11-19

## 2024-11-19 RX ORDER — TOPIRAMATE 25 MG/1
25 TABLET, FILM COATED ORAL 2 TIMES DAILY
Qty: 60 TABLET | Refills: 2 | Status: SHIPPED | OUTPATIENT
Start: 2024-11-19

## 2024-11-19 RX ADMIN — CYANOCOBALAMIN 1000 MCG: 1000 INJECTION, SOLUTION INTRAMUSCULAR; SUBCUTANEOUS at 08:59

## 2024-11-19 NOTE — TELEPHONE ENCOUNTER
Patient came in for lab work and would like to speak to pcp she has being getting migraine headaches (possibly) but she did go into the er and her bp was elevated      Please advise      Thank you

## 2024-11-19 NOTE — PROGRESS NOTES
Angeles Denise is a 64 y.o.  female and presents with    Chief Complaint   Patient presents with    Blood Pressure Check    Injections           Subjective:    She has throbbing sensation HA. She went to the ED 1 wk ago d/t HA. She had CT done did not show acute pathology. She has been having daily HA. It is in the temple b/l. She feels like she has been having nausea, and anorexia when she has a HA      Patient Active Problem List   Diagnosis    Cervical strain    COPD (chronic obstructive pulmonary disease) (HCC)    Chronic pain    S/P lumbar spinal fusion    Rheumatoid arthritis of multiple sites with negative rheumatoid factor (HCC)    Family history of leukemia    Acquired female bladder prolapse    Asymptomatic menopausal state    Lymphadenopathy, hilar    Dyspnea    H/O primary hypertension    Osteoporosis without current pathological fracture    Restrictive lung disease    History of cystocele      Past Medical History:   Diagnosis Date    Chronic obstructive pulmonary disease (HCC)     Hypertension     Other ill-defined conditions(799.89)     chronic pain    Trauma       Past Surgical History:   Procedure Laterality Date    ORTHOPEDIC SURGERY  2007    repair fracture back    SHOULDER SURGERY Right 03/01/2023    UROLOGICAL SURGERY  05/13/2024    CYSTOSCOPY, ANTERIOR COLPORRHAPHY WITH POSSIBLE SACROSPINOUS LIGAMENT FIXATION;     CECE ROONEY MD      Family History   Problem Relation Age of Onset    Leukemia Mother     Cancer Sister         bone cancer unknown    Breast Cancer Other      Social History     Socioeconomic History    Marital status: Single     Spouse name: Not on file    Number of children: Not on file    Years of education: Not on file    Highest education level: Not on file   Occupational History    Not on file   Tobacco Use    Smoking status: Never     Passive exposure: Past    Smokeless tobacco: Never   Vaping Use    Vaping status: Never Used   Substance and Sexual Activity

## 2024-11-19 NOTE — PROGRESS NOTES
PHYSICAL / OCCUPATIONAL THERAPY - DAILY TREATMENT NOTE (updated )    Patient Name: Angeles Denise    Date: 2024    : 1960  Insurance: Payor: [employer] / Plan: [employer] / Product Type: *No Product type* /      Patient  verified Yes     Visit #   Current / Total 4 16   Time   In / Out 12:40 1:20   Pain   In / Out 0 0   Subjective Functional Status/Changes: Pt reports going every 2-3 hours and not leaking     TREATMENT AREA =  Pelvic and perineal pain [R10.2]    OBJECTIVE         Therapeutic Procedures:    Tx Min Billable or 1:1 Min (if diff from Tx Min) Procedure, Rationale, Specifics   23  52746 Neuromuscular Re-Education (timed):  improve balance, coordination, kinesthetic sense, posture, core stability and proprioception to improve patient's ability to develop conscious control of individual muscles and awareness of position of extremities in order to progress to PLOF and address remaining functional goals. (see flow sheet as applicable)     Details if applicable:       9  58560 Therapeutic Exercise (timed):  increase ROM, strength, coordination, balance, and proprioception to improve patient's ability to progress to PLOF and address remaining functional goals. (see flow sheet as applicable)     Details if applicable:     8  71028 Therapeutic Activity (timed):  use of dynamic activities replicating functional movements to increase ROM, strength, coordination, balance, and proprioception in order to improve patient's ability to progress to PLOF and address remaining functional goals.  (see flow sheet as applicable)     Details if applicable:            Details if applicable:            Details if applicable:     40  [facility] Totals Reminder: bill using total billable min of TIMED therapeutic procedures (example: do not include dry needle or estim unattended, both untimed codes, in totals to left)  8-22 min = 1 unit; 23-37 min = 2 units; 38-52 min = 3 units; 53-67 min = 4

## 2024-11-20 LAB
BASOPHILS ABSOLUTE: 0.1 K/UL (ref 0–0.2)
BASOPHILS RELATIVE PERCENT: 1 % (ref 0–2)
EOSINOPHIL # BLD: 4 % (ref 0–6)
EOSINOPHILS ABSOLUTE: 0.3 K/UL (ref 0–0.5)
HCT VFR BLD CALC: 38.2 % (ref 35.1–48)
HEMOGLOBIN: 12 G/DL (ref 11.7–16)
IRON % SATURATION: 9 % (ref 20–50)
IRON: 36 MCG/DL (ref 30–160)
LYMPHOCYTES # BLD: 48 % (ref 20–45)
LYMPHOCYTES ABSOLUTE: 3.3 K/UL (ref 1–4.8)
MCH RBC QN AUTO: 28 PG (ref 26–34)
MCHC RBC AUTO-ENTMCNC: 31 G/DL (ref 31–36)
MCV RBC AUTO: 88 FL (ref 80–99)
MONOCYTES ABSOLUTE: 0.6 K/UL (ref 0.1–1)
MONOCYTES: 9 % (ref 3–12)
NEUTROPHILS ABSOLUTE: 2.7 K/UL (ref 1.8–7.7)
NEUTROPHILS: 39 % (ref 40–75)
PDW BLD-RTO: 19.7 % (ref 10–15.5)
PLATELET # BLD: 411 K/UL (ref 140–440)
PMV BLD AUTO: 9.8 FL (ref 9–13)
RBC # BLD: 4.35 M/UL (ref 3.8–5.2)
TOTAL IRON BINDING CAPACITY: 396 MCG/DL (ref 228–428)
UIBC: 360 MCG/DL (ref 110–370)
WBC # BLD: 7 K/UL (ref 4–11)

## 2024-11-21 ENCOUNTER — APPOINTMENT (OUTPATIENT)
Facility: HOSPITAL | Age: 64
End: 2024-11-21
Payer: COMMERCIAL

## 2024-11-26 ENCOUNTER — HOSPITAL ENCOUNTER (OUTPATIENT)
Facility: HOSPITAL | Age: 64
Setting detail: RECURRING SERIES
End: 2024-11-26
Payer: COMMERCIAL

## 2024-11-27 ENCOUNTER — APPOINTMENT (OUTPATIENT)
Facility: HOSPITAL | Age: 64
End: 2024-11-27
Payer: COMMERCIAL

## 2024-11-30 DIAGNOSIS — I10 PRIMARY HYPERTENSION: ICD-10-CM

## 2024-12-02 RX ORDER — LOSARTAN POTASSIUM 25 MG/1
TABLET ORAL
Qty: 90 TABLET | Refills: 1 | Status: SHIPPED | OUTPATIENT
Start: 2024-12-02

## 2024-12-02 NOTE — TELEPHONE ENCOUNTER
Medication(s) requesting:   Requested Prescriptions     Pending Prescriptions Disp Refills    losartan (COZAAR) 25 MG tablet [Pharmacy Med Name: LOSARTAN POTASSIUM 25 MG TAB] 90 tablet 1     Sig: TOME 1 TABLETA POR VIA ORAL TONERIS ROQUE       Last office visit:  11/19/2024  Next office visit DMA: 12/23/2024

## 2024-12-04 ENCOUNTER — TELEPHONE (OUTPATIENT)
Facility: HOSPITAL | Age: 64
End: 2024-12-04

## 2024-12-10 ENCOUNTER — HOSPITAL ENCOUNTER (OUTPATIENT)
Facility: HOSPITAL | Age: 64
Setting detail: RECURRING SERIES
Discharge: HOME | End: 2024-12-13
Payer: COMMERCIAL

## 2024-12-10 PROCEDURE — 97112 NEUROMUSCULAR REEDUCATION: CPT

## 2024-12-10 PROCEDURE — 97530 THERAPEUTIC ACTIVITIES: CPT

## 2024-12-10 PROCEDURE — 97535 SELF CARE MNGMENT TRAINING: CPT

## 2024-12-10 NOTE — PROGRESS NOTES
condition or symptoms returning.   Status at eval : patient fearful of return to exercise and unaware of what activities to avoid to avoid exacerbation of current condition  Current: pt doesn't yet feel ready for D/C PROGRESSING    RECOMMENDATIONS  While patient has less leaking and urinary frequency overall, she is still having urinary leaking and frequency. She continues to require skilled PT for 4 more weeks to return to normal voiding every 3-4 hours, decrease bladder pain and decrease leaking.      If you have any questions/comments please contact us directly at [phone]   Thank you for allowing us to assist in the care of your patient.  PTA Signature Kassandra Win, PT     Therapist Signature: Kassandra Win PT Date: 12/10/2024   Reporting Period  11/5/24 - 12/10/24 Time: 4:13 PM

## 2024-12-10 NOTE — PROGRESS NOTES
(example: do not include dry needle or estim unattended, both untimed codes, in totals to left)  8-22 min = 1 unit; 23-37 min = 2 units; 38-52 min = 3 units; 53-67 min = 4 units; 68-82 min = 5 units   Total Total     [x]  Patient Education billed concurrently with other procedures   [x] Review HEP    [] Progressed/Changed HEP, detail:    [] Other detail:       Objective Information/Functional Measures/Assessment    Pt tolerated tx well today, shows a stronger pelvic floor    Patient will continue to benefit from skilled PT / OT services to modify and progress therapeutic interventions, analyze and address functional mobility deficits, analyze and address ROM deficits, analyze and address strength deficits, analyze and address soft tissue restrictions, analyze and cue for proper movement patterns, analyze and modify for postural abnormalities, analyze and address imbalance/dizziness, and instruct in home and community integration to address functional deficits and attain remaining goals.    Progress toward goals / Updated goals:  [x]  See Progress Note/Recertification    Short term goals: To be achieved in 2 weeks:  Patient will demonstrate proper dietary/fluid habits and urge suppression strategies that promote bladder and bowel health to aid in management of urinary urgency and incontinence.  Status at eval: Patient consuming 4-5 bottles of water, 1 glass of tea and juice and unaware of bladder fitness, dietary irritants and urge suppression strategies.      Long term goals: To be achieved in 4 weeks:  Patient will report bladder continence 25-50% of the time with walking to the toilet.   Status at eval: patient stress and urgency incontinence 3 times  per day, soaked     Patient will demonstrate improvement of current complaints evidenced by a 16 point  improvement in LAURI  Status at Eval: LAURI 71  Current: LAURI 59.33 PROGRESSING     Patient will demonstrate independence with management tools and exercise program

## 2024-12-19 ENCOUNTER — COMMUNITY OUTREACH (OUTPATIENT)
Facility: CLINIC | Age: 64
End: 2024-12-19

## 2024-12-19 NOTE — PROGRESS NOTES
Patient's HM shows they are overdue for Colorectal Screening.   Care Everywhere and  files searched.   updated with 2021 FIT.

## 2024-12-23 ENCOUNTER — OFFICE VISIT (OUTPATIENT)
Facility: CLINIC | Age: 64
End: 2024-12-23

## 2024-12-23 VITALS
RESPIRATION RATE: 14 BRPM | BODY MASS INDEX: 26.68 KG/M2 | DIASTOLIC BLOOD PRESSURE: 56 MMHG | HEIGHT: 62 IN | OXYGEN SATURATION: 98 % | TEMPERATURE: 97.5 F | SYSTOLIC BLOOD PRESSURE: 110 MMHG | HEART RATE: 81 BPM | WEIGHT: 145 LBS

## 2024-12-23 DIAGNOSIS — Z23 NEEDS FLU SHOT: ICD-10-CM

## 2024-12-23 DIAGNOSIS — Z12.31 ENCOUNTER FOR SCREENING MAMMOGRAM FOR BREAST CANCER: ICD-10-CM

## 2024-12-23 DIAGNOSIS — G43.909 MIGRAINE WITHOUT STATUS MIGRAINOSUS, NOT INTRACTABLE, UNSPECIFIED MIGRAINE TYPE: ICD-10-CM

## 2024-12-23 DIAGNOSIS — M05.79 RHEUMATOID ARTHRITIS INVOLVING MULTIPLE SITES WITH POSITIVE RHEUMATOID FACTOR (HCC): Primary | ICD-10-CM

## 2024-12-23 DIAGNOSIS — E61.1 IRON DEFICIENCY: ICD-10-CM

## 2024-12-23 DIAGNOSIS — Z12.11 SCREENING FOR COLON CANCER: ICD-10-CM

## 2024-12-23 RX ORDER — FERROUS SULFATE 325(65) MG
325 TABLET ORAL
Qty: 90 TABLET | Refills: 1 | Status: SHIPPED | OUTPATIENT
Start: 2024-12-23

## 2024-12-23 SDOH — ECONOMIC STABILITY: FOOD INSECURITY: WITHIN THE PAST 12 MONTHS, YOU WORRIED THAT YOUR FOOD WOULD RUN OUT BEFORE YOU GOT MONEY TO BUY MORE.: NEVER TRUE

## 2024-12-23 SDOH — ECONOMIC STABILITY: FOOD INSECURITY: WITHIN THE PAST 12 MONTHS, THE FOOD YOU BOUGHT JUST DIDN'T LAST AND YOU DIDN'T HAVE MONEY TO GET MORE.: NEVER TRUE

## 2024-12-23 SDOH — ECONOMIC STABILITY: INCOME INSECURITY: HOW HARD IS IT FOR YOU TO PAY FOR THE VERY BASICS LIKE FOOD, HOUSING, MEDICAL CARE, AND HEATING?: NOT HARD AT ALL

## 2024-12-23 ASSESSMENT — ENCOUNTER SYMPTOMS
EYE REDNESS: 0
CONSTIPATION: 0
COLOR CHANGE: 0
EYE DISCHARGE: 0
DIARRHEA: 0
BACK PAIN: 0
SHORTNESS OF BREATH: 0
ABDOMINAL PAIN: 0
EYE PAIN: 0
EYE ITCHING: 0
VOMITING: 0
FACIAL SWELLING: 0

## 2024-12-23 NOTE — PROGRESS NOTES
Angeles Denise is a 64 y.o. year old female who presents today for   Chief Complaint   Patient presents with    Follow-up     5 WK F/U        \"Have you been to the ER, urgent care clinic since your last visit?  Hospitalized since your last visit?\"   NO     “Have you seen or consulted any other health care providers outside our system since your last visit?”   YES - When: approximately 2  weeks ago.  Where and Why: PULMONOLOGY.     Have you had a mammogram?”   NO    Date of last Mammogram: 8/23/2022      “Have you had a pap smear?”    YES 2024    No cervical cancer screening on file             - TAINA Lopez  [facility]  Phone: [phone]  Fax: [phone]  
9/23/2024)       No current facility-administered medications for this visit.        ROS   Review of Systems   Constitutional:  Negative for activity change and appetite change.   HENT:  Negative for congestion, drooling, ear discharge, ear pain and facial swelling.    Eyes:  Negative for pain, discharge, redness and itching.   Respiratory:  Negative for shortness of breath.    Cardiovascular:  Negative for leg swelling.   Gastrointestinal:  Negative for abdominal pain, constipation, diarrhea and vomiting.   Genitourinary:  Negative for difficulty urinating, frequency, hematuria and urgency.   Musculoskeletal:  Negative for arthralgias, back pain, myalgias and neck pain.   Skin:  Negative for color change.   Neurological:  Negative for dizziness, seizures, numbness and headaches.   Psychiatric/Behavioral:  Negative for agitation, behavioral problems, decreased concentration, sleep disturbance and suicidal ideas.              Objective:  Vitals:    12/23/24 0853   BP: (!) 110/56   Site: Left Upper Arm   Position: Sitting   Cuff Size: Small Adult   Pulse: 81   Resp: 14   Temp: 97.5 °F (36.4 °C)   TempSrc: Temporal   SpO2: 98%   Weight: 65.8 kg (145 lb)   Height: 1.575 m (5' 2\")         Physical Exam  Vitals reviewed.   Constitutional:       Appearance: She is normal weight.   HENT:      Head: Normocephalic.      Nose: No congestion or rhinorrhea.   Eyes:      General: No scleral icterus.        Right eye: No discharge.         Left eye: No discharge.   Cardiovascular:      Rate and Rhythm: Normal rate and regular rhythm.   Pulmonary:      Effort: Pulmonary effort is normal.      Breath sounds: Normal breath sounds.   Abdominal:      General: Abdomen is flat.      Palpations: Abdomen is soft.   Musculoskeletal:         General: Normal range of motion.      Cervical back: Normal range of motion and neck supple. No rigidity.   Skin:     General: Skin is warm and dry.   Neurological:      Mental Status: She is alert and

## 2024-12-31 LAB — NONINV COLON CA DNA+OCC BLD SCRN STL QL: NORMAL

## 2025-02-04 ENCOUNTER — HOSPITAL ENCOUNTER (OUTPATIENT)
Dept: WOMENS IMAGING | Facility: HOSPITAL | Age: 65
Discharge: HOME | End: 2025-02-07
Attending: STUDENT IN AN ORGANIZED HEALTH CARE EDUCATION/TRAINING PROGRAM
Payer: COMMERCIAL

## 2025-02-04 DIAGNOSIS — Z12.31 ENCOUNTER FOR SCREENING MAMMOGRAM FOR BREAST CANCER: ICD-10-CM

## 2025-02-04 PROCEDURE — 77067 SCR MAMMO BI INCL CAD: CPT

## 2025-02-06 LAB — NONINV COLON CA DNA+OCC BLD SCRN STL QL: NEGATIVE

## 2025-02-07 ENCOUNTER — OFFICE VISIT (OUTPATIENT)
Facility: CLINIC | Age: 65
End: 2025-02-07
Payer: COMMERCIAL

## 2025-02-07 VITALS
HEIGHT: 62 IN | DIASTOLIC BLOOD PRESSURE: 74 MMHG | TEMPERATURE: 97.7 F | WEIGHT: 148.2 LBS | BODY MASS INDEX: 27.27 KG/M2 | OXYGEN SATURATION: 99 % | SYSTOLIC BLOOD PRESSURE: 120 MMHG | RESPIRATION RATE: 14 BRPM | HEART RATE: 78 BPM

## 2025-02-07 DIAGNOSIS — M05.79 RHEUMATOID ARTHRITIS INVOLVING MULTIPLE SITES WITH POSITIVE RHEUMATOID FACTOR (HCC): ICD-10-CM

## 2025-02-07 DIAGNOSIS — D50.9 IRON DEFICIENCY ANEMIA, UNSPECIFIED IRON DEFICIENCY ANEMIA TYPE: Primary | ICD-10-CM

## 2025-02-07 DIAGNOSIS — I10 ESSENTIAL HYPERTENSION: ICD-10-CM

## 2025-02-07 DIAGNOSIS — R60.0 LEG EDEMA: ICD-10-CM

## 2025-02-07 PROCEDURE — 3078F DIAST BP <80 MM HG: CPT | Performed by: STUDENT IN AN ORGANIZED HEALTH CARE EDUCATION/TRAINING PROGRAM

## 2025-02-07 PROCEDURE — 99214 OFFICE O/P EST MOD 30 MIN: CPT | Performed by: STUDENT IN AN ORGANIZED HEALTH CARE EDUCATION/TRAINING PROGRAM

## 2025-02-07 PROCEDURE — 3074F SYST BP LT 130 MM HG: CPT | Performed by: STUDENT IN AN ORGANIZED HEALTH CARE EDUCATION/TRAINING PROGRAM

## 2025-02-07 SDOH — ECONOMIC STABILITY: FOOD INSECURITY: WITHIN THE PAST 12 MONTHS, THE FOOD YOU BOUGHT JUST DIDN'T LAST AND YOU DIDN'T HAVE MONEY TO GET MORE.: NEVER TRUE

## 2025-02-07 SDOH — ECONOMIC STABILITY: FOOD INSECURITY: WITHIN THE PAST 12 MONTHS, YOU WORRIED THAT YOUR FOOD WOULD RUN OUT BEFORE YOU GOT MONEY TO BUY MORE.: NEVER TRUE

## 2025-02-07 ASSESSMENT — ENCOUNTER SYMPTOMS
COLOR CHANGE: 0
EYE PAIN: 0
SHORTNESS OF BREATH: 0
BACK PAIN: 0
EYE REDNESS: 0
DIARRHEA: 0
VOMITING: 0
FACIAL SWELLING: 0
CONSTIPATION: 0
ABDOMINAL PAIN: 0
EYE ITCHING: 0
EYE DISCHARGE: 0

## 2025-02-07 ASSESSMENT — PATIENT HEALTH QUESTIONNAIRE - PHQ9
10. IF YOU CHECKED OFF ANY PROBLEMS, HOW DIFFICULT HAVE THESE PROBLEMS MADE IT FOR YOU TO DO YOUR WORK, TAKE CARE OF THINGS AT HOME, OR GET ALONG WITH OTHER PEOPLE: NOT DIFFICULT AT ALL
5. POOR APPETITE OR OVEREATING: NOT AT ALL
4. FEELING TIRED OR HAVING LITTLE ENERGY: NOT AT ALL
3. TROUBLE FALLING OR STAYING ASLEEP: NOT AT ALL
SUM OF ALL RESPONSES TO PHQ QUESTIONS 1-9: 0
6. FEELING BAD ABOUT YOURSELF - OR THAT YOU ARE A FAILURE OR HAVE LET YOURSELF OR YOUR FAMILY DOWN: NOT AT ALL
SUM OF ALL RESPONSES TO PHQ QUESTIONS 1-9: 0
9. THOUGHTS THAT YOU WOULD BE BETTER OFF DEAD, OR OF HURTING YOURSELF: NOT AT ALL
SUM OF ALL RESPONSES TO PHQ QUESTIONS 1-9: 0
7. TROUBLE CONCENTRATING ON THINGS, SUCH AS READING THE NEWSPAPER OR WATCHING TELEVISION: NOT AT ALL
2. FEELING DOWN, DEPRESSED OR HOPELESS: NOT AT ALL
8. MOVING OR SPEAKING SO SLOWLY THAT OTHER PEOPLE COULD HAVE NOTICED. OR THE OPPOSITE, BEING SO FIGETY OR RESTLESS THAT YOU HAVE BEEN MOVING AROUND A LOT MORE THAN USUAL: NOT AT ALL
SUM OF ALL RESPONSES TO PHQ9 QUESTIONS 1 & 2: 0
1. LITTLE INTEREST OR PLEASURE IN DOING THINGS: NOT AT ALL
SUM OF ALL RESPONSES TO PHQ QUESTIONS 1-9: 0

## 2025-02-07 NOTE — PROGRESS NOTES
Angeles Denise is a 64 y.o. year old female who presents today for   Chief Complaint   Patient presents with    3 Month Follow-Up        \"Have you been to the ER, urgent care clinic since your last visit?  Hospitalized since your last visit?\"   YES - When: approximately 2  weeks ago.  Where and Why: ED- RIB PAIN.     “Have you seen or consulted any other health care providers outside our system since your last visit?”   NO      “Have you had a pap smear?”    NO    No cervical cancer screening on file             - TAINA Lopez  [facility]  Phone: [phone]  Fax: [phone]

## 2025-02-07 NOTE — PROGRESS NOTES
Angeles Denise is a 64 y.o.  female and presents with    Chief Complaint   Patient presents with    3 Month Follow-Up    Leg Swelling           Subjective:    Went to the ED after a fall. Was told that she had rib fracture, but    She has been for 1 month w/o RA prescription. Her insurance is no longer covering Humira. Noted on the notes that a change of her prescription was requested. She feels like her hands have been in more pain in the morning, but after moving them she has been feeling better.     She also has been having issues when it comes to her breathing. The insurance is also not covering medications that have steroids.     She has not have to take Topiramate bc her HA have improved. She is taking her BP pills, and denies any there issues.     She feels like her legd     Patient Active Problem List   Diagnosis    Cervical strain    COPD (chronic obstructive pulmonary disease) (HCC)    Chronic pain    S/P lumbar spinal fusion    Rheumatoid arthritis of multiple sites with negative rheumatoid factor (HCC)    Family history of leukemia    Acquired female bladder prolapse    Asymptomatic menopausal state    Lymphadenopathy, hilar    Dyspnea    H/O primary hypertension    Osteoporosis without current pathological fracture    Restrictive lung disease    History of cystocele      Past Medical History:   Diagnosis Date    Chronic obstructive pulmonary disease (HCC)     Hypertension     Other ill-defined conditions(799.89)     chronic pain    Trauma       Past Surgical History:   Procedure Laterality Date    ORTHOPEDIC SURGERY  2007    repair fracture back    SHOULDER SURGERY Right 03/01/2023    UROLOGICAL SURGERY  05/13/2024    CYSTOSCOPY, ANTERIOR COLPORRHAPHY WITH POSSIBLE SACROSPINOUS LIGAMENT FIXATION;     CECE ROONEY MD      Family History   Problem Relation Age of Onset    Leukemia Mother     Cancer Sister         bone cancer unknown    Breast Cancer Other      Social History

## 2025-03-03 ENCOUNTER — HOSPITAL ENCOUNTER (OUTPATIENT)
Facility: HOSPITAL | Age: 65
Setting detail: RECURRING SERIES
Discharge: HOME | End: 2025-03-06
Payer: COMMERCIAL

## 2025-03-03 PROCEDURE — 97161 PT EVAL LOW COMPLEX 20 MIN: CPT

## 2025-03-03 PROCEDURE — 97112 NEUROMUSCULAR REEDUCATION: CPT

## 2025-03-03 NOTE — PROGRESS NOTES
contraction 2/5 and ability to maintain contraction for 10 seconds, 10 repetitions.   Status at eval: PFM 1/5, 2 second hold, 10 repetitions, with abdominal and adductor substitution     Patient will demonstrate improvement of current complaints evidenced by a 12 point  improvement in Pelvic functional disability index score  Status at Eval: Pelvic functional disability index 22     Patient will demonstrate independence with management tools and exercise program that are beneficial for current condition in order to feel comfortable with Pelvic floor PT D/C and not fear exacerbation of current condition or symptoms returning.   Status at eval : patient fearful of return to exercise and unaware of what activities to avoid to avoid exacerbation of current condition    Frequency / Duration: Patient would benefit from skilled PT 2x/week x 4 weeks  Patient/ Caregiver education and instruction: Diagnosis, prognosis, self care, activity modification, and exercises [x]  Plan of care has been reviewed with MARY Win, PT       3/3/2025       2:21 PM  ===================================================================  I certify that the above Therapy Services are being furnished while the patient is under my care. I agree with the treatment plan and certify that this therapy is necessary.    Physician's Signature:_________________________   DATE:_________   TIME:________                           Lizet Poon PA    ** Signature, Date and Time must be completed for valid certification **  Please sign and return to [facility] or you may fax the signed copy to [phone].  Thank you.     If an interpreting service was utilized for treatment of this patient, the contents of this document represent the material reviewed with the patient via the .    [phone]  Fx [phone]   
in Pelvic functional disability index score  Status at Eval: Pelvic functional disability index 22    Patient will demonstrate independence with management tools and exercise program that are beneficial for current condition in order to feel comfortable with Pelvic floor PT D/C and not fear exacerbation of current condition or symptoms returning.   Status at eval : patient fearful of return to exercise and unaware of what activities to avoid to avoid exacerbation of current condition    PLAN  []  Upgrade activities as tolerated     [x]  Continue plan of care  []  Update interventions per flow sheet       []  Discharge due to:_  []  Other:_      Kassandra Win, PT 3/3/2025  9:39 AM     If an interpreting service was utilized for treatment of this patient, the contents of this document represent the material reviewed with the patient via the .

## 2025-03-05 DIAGNOSIS — R60.0 LOWER LEG EDEMA: ICD-10-CM

## 2025-03-05 DIAGNOSIS — R06.00 DYSPNEA, UNSPECIFIED TYPE: Primary | ICD-10-CM

## 2025-03-14 ENCOUNTER — HOSPITAL ENCOUNTER (OUTPATIENT)
Facility: HOSPITAL | Age: 65
Setting detail: RECURRING SERIES
Discharge: HOME | End: 2025-03-17
Payer: COMMERCIAL

## 2025-03-14 PROCEDURE — 97110 THERAPEUTIC EXERCISES: CPT

## 2025-03-14 PROCEDURE — 97535 SELF CARE MNGMENT TRAINING: CPT

## 2025-03-14 PROCEDURE — 97112 NEUROMUSCULAR REEDUCATION: CPT

## 2025-03-14 NOTE — PROGRESS NOTES
charge capture    [x]  Patient Education billed concurrently with other procedures   [x] Review HEP    [] Progressed/Changed HEP, detail:    [] Other detail:       Objective Information/Functional Measures/Assessment    Patient was thoroughly educated on good bladder habits/urge suppression techniques today. Her pelvic floor strength was better, progressed as per HEP.    Patient will continue to benefit from skilled PT / OT services to modify and progress therapeutic interventions, analyze and address functional mobility deficits, analyze and address ROM deficits, analyze and address strength deficits, analyze and address soft tissue restrictions, analyze and cue for proper movement patterns, analyze and modify for postural abnormalities, analyze and address imbalance/dizziness, and instruct in home and community integration to address functional deficits and attain remaining goals.    Progress toward goals / Updated goals:  []  See Progress Note/Recertification    Short term goals: To be achieved in 2 weeks:  Patient will demonstrate proper dietary/fluid habits and urge suppression strategies that promote bladder and bowel health to aid in management of urinary urgency and incontinence.  Status at eval: Patient consuming 4-6 bottles of water and unaware of bladder fitness, dietary irritants and urge suppression strategies.   Current: educated on this today PROGRESSING     Long term goals: To be achieved in 4 weeks:  Patient will report bladder continence % of the time with cough/sneeze/laugh and walking to the toilet.   Status at eval: patient stress and urgency incontinence 2-3 times  per day     Patient will demonstrate pelvic floor muscle contraction 2/5 and ability to maintain contraction for 10 seconds, 10 repetitions.   Status at eval: PFM 1/5, 2 second hold, 10 repetitions, with abdominal and adductor substitution     Patient will demonstrate improvement of current complaints evidenced by a 12

## 2025-03-18 ENCOUNTER — HOSPITAL ENCOUNTER (OUTPATIENT)
Facility: HOSPITAL | Age: 65
Setting detail: RECURRING SERIES
Discharge: HOME | End: 2025-03-21
Payer: COMMERCIAL

## 2025-03-18 PROCEDURE — 97110 THERAPEUTIC EXERCISES: CPT

## 2025-03-18 PROCEDURE — 97112 NEUROMUSCULAR REEDUCATION: CPT

## 2025-03-18 NOTE — PROGRESS NOTES
PHYSICAL / OCCUPATIONAL THERAPY - DAILY TREATMENT NOTE (updated )    Patient Name: Angeles Denise    Date: 3/18/2025    : 1960  Insurance: Payor: ENA / Plan: [employer] HMO / Product Type: *No Product type* /      Patient  verified Yes     Visit #   Current / Total 3 12   Time   In / Out 12:40 1:20   Pain   In / Out 3 2   Subjective Functional Status/Changes: Pt reports that it doesn't burn that much and she is constantly taking the medicine. She takes 800 mg 3x/day.      TREATMENT AREA =  Pelvic and perineal pain    OBJECTIVE    Therapeutic Procedures:    Tx Min Billable or 1:1 Min (if diff from Tx Min) Procedure, Rationale, Specifics   25  91500 Neuromuscular Re-Education (timed):  improve balance, coordination, kinesthetic sense, posture, core stability and proprioception to improve patient's ability to develop conscious control of individual muscles and awareness of position of extremities in order to progress to PLOF and address remaining functional goals. (see flow sheet as applicable)     Details if applicable:     15  25111 Therapeutic Exercise (timed):  increase ROM, strength, coordination, balance, and proprioception to improve patient's ability to progress to PLOF and address remaining functional goals. (see flow sheet as applicable)     Details if applicable:            Details if applicable:     40  [facility] Totals Reminder: bill using total billable min of TIMED therapeutic procedures (example: do not include dry needle or estim unattended, both untimed codes, in totals to left)  8-22 min = 1 unit; 23-37 min = 2 units; 38-52 min = 3 units; 53-67 min = 4 units; 68-82 min = 5 units   Total Total     charge capture    [x]  Patient Education billed concurrently with other procedures   [x] Review HEP    [] Progressed/Changed HEP, detail:    [] Other detail:       Objective Information/Functional Measures/Assessment    HEP progressed to include tabletop TA iso and SL hip abd    Patient

## 2025-03-19 ENCOUNTER — HOSPITAL ENCOUNTER (OUTPATIENT)
Facility: HOSPITAL | Age: 65
Setting detail: RECURRING SERIES
Discharge: HOME | End: 2025-03-22
Payer: COMMERCIAL

## 2025-03-19 PROCEDURE — 97112 NEUROMUSCULAR REEDUCATION: CPT

## 2025-03-19 PROCEDURE — 97110 THERAPEUTIC EXERCISES: CPT

## 2025-03-19 NOTE — PROGRESS NOTES
PHYSICAL / OCCUPATIONAL THERAPY - DAILY TREATMENT NOTE (updated )    Patient Name: Angeles Denise    Date: 3/19/2025    : 1960  Insurance: Payor: ENA / Plan: [employer] HMO / Product Type: *No Product type* /      Patient  verified Yes     Visit #   Current / Total 4 12   Time   In / Out 2 2:40   Pain   In / Out 0 0   Subjective Functional Status/Changes: Pt reports no pain today      TREATMENT AREA =  Pelvic and perineal pain    OBJECTIVE    Therapeutic Procedures:    Tx Min Billable or 1:1 Min (if diff from Tx Min) Procedure, Rationale, Specifics   25  95587 Neuromuscular Re-Education (timed):  improve balance, coordination, kinesthetic sense, posture, core stability and proprioception to improve patient's ability to develop conscious control of individual muscles and awareness of position of extremities in order to progress to PLOF and address remaining functional goals. (see flow sheet as applicable)     Details if applicable:     15  70939 Therapeutic Exercise (timed):  increase ROM, strength, coordination, balance, and proprioception to improve patient's ability to progress to PLOF and address remaining functional goals. (see flow sheet as applicable)     Details if applicable:            Details if applicable:     40  [facility] Totals Reminder: bill using total billable min of TIMED therapeutic procedures (example: do not include dry needle or estim unattended, both untimed codes, in totals to left)  8-22 min = 1 unit; 23-37 min = 2 units; 38-52 min = 3 units; 53-67 min = 4 units; 68-82 min = 5 units   Total Total     charge capture    [x]  Patient Education billed concurrently with other procedures   [x] Review HEP    [] Progressed/Changed HEP, detail:    [] Other detail:       Objective Information/Functional Measures/Assessment    HEP progressed to include tabletop TA iso and SL hip abd  Pt had difficulty with seated core exercises    Patient will continue to benefit from skilled PT / OT

## 2025-03-25 ENCOUNTER — HOSPITAL ENCOUNTER (OUTPATIENT)
Facility: HOSPITAL | Age: 65
Setting detail: RECURRING SERIES
Discharge: HOME | End: 2025-03-28
Payer: COMMERCIAL

## 2025-03-25 PROCEDURE — 97110 THERAPEUTIC EXERCISES: CPT

## 2025-03-25 PROCEDURE — 97112 NEUROMUSCULAR REEDUCATION: CPT

## 2025-03-25 NOTE — PROGRESS NOTES
PHYSICAL / OCCUPATIONAL THERAPY - DAILY TREATMENT NOTE (updated )    Patient Name: Angeles Denise    Date: 3/25/2025    : 1960  Insurance: Payor: ENA / Plan: [employer] HMO / Product Type: *No Product type* /      Patient  verified Yes     Visit #   Current / Total 5 12   Time   In / Out 12:40 1:20   Pain   In / Out 0 0   Subjective Functional Status/Changes: Pt reports no pain today      TREATMENT AREA =  Pelvic and perineal pain    OBJECTIVE    Therapeutic Procedures:    Tx Min Billable or 1:1 Min (if diff from Tx Min) Procedure, Rationale, Specifics   25  89809 Neuromuscular Re-Education (timed):  improve balance, coordination, kinesthetic sense, posture, core stability and proprioception to improve patient's ability to develop conscious control of individual muscles and awareness of position of extremities in order to progress to PLOF and address remaining functional goals. (see flow sheet as applicable)     Details if applicable:     15  88001 Therapeutic Exercise (timed):  increase ROM, strength, coordination, balance, and proprioception to improve patient's ability to progress to PLOF and address remaining functional goals. (see flow sheet as applicable)     Details if applicable:            Details if applicable:     40  [facility] Totals Reminder: bill using total billable min of TIMED therapeutic procedures (example: do not include dry needle or estim unattended, both untimed codes, in totals to left)  8-22 min = 1 unit; 23-37 min = 2 units; 38-52 min = 3 units; 53-67 min = 4 units; 68-82 min = 5 units   Total Total     charge capture    [x]  Patient Education billed concurrently with other procedures   [x] Review HEP    [] Progressed/Changed HEP, detail:    [] Other detail:       Objective Information/Functional Measures/Assessment  Pt had a very difficult time with SL abd, so was changed to clamshells. She is significantly more weak through L side vs R.     Patient will continue to

## 2025-03-26 ENCOUNTER — HOSPITAL ENCOUNTER (OUTPATIENT)
Facility: HOSPITAL | Age: 65
Setting detail: RECURRING SERIES
Discharge: HOME | End: 2025-03-29
Payer: COMMERCIAL

## 2025-03-26 PROCEDURE — 97112 NEUROMUSCULAR REEDUCATION: CPT

## 2025-03-26 PROCEDURE — 97110 THERAPEUTIC EXERCISES: CPT

## 2025-03-26 NOTE — PROGRESS NOTES
PHYSICAL / OCCUPATIONAL THERAPY - DAILY TREATMENT NOTE (updated )    Patient Name: Angeles Denise    Date: 3/26/2025    : 1960  Insurance: Payor: ENA / Plan: E[employer] YANG HMO / Product Type: *No Product type* /      Patient  verified Yes     Visit #   Current / Total 6 12   Time   In / Out 10:40 11:20   Pain   In / Out 0 0   Subjective Functional Status/Changes: Pt reports feeling fine after last session     TREATMENT AREA =  Pelvic and perineal pain    OBJECTIVE    Therapeutic Procedures:    Tx Min Billable or 1:1 Min (if diff from Tx Min) Procedure, Rationale, Specifics   25  42804 Neuromuscular Re-Education (timed):  improve balance, coordination, kinesthetic sense, posture, core stability and proprioception to improve patient's ability to develop conscious control of individual muscles and awareness of position of extremities in order to progress to PLOF and address remaining functional goals. (see flow sheet as applicable)     Details if applicable:     15  97005 Therapeutic Exercise (timed):  increase ROM, strength, coordination, balance, and proprioception to improve patient's ability to progress to PLOF and address remaining functional goals. (see flow sheet as applicable)     Details if applicable:            Details if applicable:     40  [facility] Totals Reminder: bill using total billable min of TIMED therapeutic procedures (example: do not include dry needle or estim unattended, both untimed codes, in totals to left)  8-22 min = 1 unit; 23-37 min = 2 units; 38-52 min = 3 units; 53-67 min = 4 units; 68-82 min = 5 units   Total Total     charge capture    [x]  Patient Education billed concurrently with other procedures   [x] Review HEP    [] Progressed/Changed HEP, detail:    [] Other detail:       Objective Information/Functional Measures/Assessment  Difficulty with mod plank, R sh p! at end of 2nd rep. Donkey kick added to HEP    Patient will continue to benefit from skilled PT / OT

## 2025-04-01 ENCOUNTER — HOSPITAL ENCOUNTER (OUTPATIENT)
Facility: HOSPITAL | Age: 65
Setting detail: RECURRING SERIES
Discharge: HOME | End: 2025-04-04
Payer: COMMERCIAL

## 2025-04-01 PROCEDURE — 97110 THERAPEUTIC EXERCISES: CPT

## 2025-04-01 PROCEDURE — 97112 NEUROMUSCULAR REEDUCATION: CPT

## 2025-04-01 NOTE — PROGRESS NOTES
PHYSICAL / OCCUPATIONAL THERAPY - DAILY TREATMENT NOTE (updated )    Patient Name: Angeles Denise    Date: 2025    : 1960  Insurance: Payor: ENA / Plan: [employer] HMO / Product Type: *No Product type* /      Patient  verified Yes     Visit #   Current / Total 7 12   Time   In / Out 2:00 2:40   Pain   In / Out 0 0   Subjective Functional Status/Changes: Pt reports feeling well     TREATMENT AREA =  Pelvic and perineal pain    OBJECTIVE    Therapeutic Procedures:    Tx Min Billable or 1:1 Min (if diff from Tx Min) Procedure, Rationale, Specifics   25  89822 Neuromuscular Re-Education (timed):  improve balance, coordination, kinesthetic sense, posture, core stability and proprioception to improve patient's ability to develop conscious control of individual muscles and awareness of position of extremities in order to progress to PLOF and address remaining functional goals. (see flow sheet as applicable)     Details if applicable:     15  05109 Therapeutic Exercise (timed):  increase ROM, strength, coordination, balance, and proprioception to improve patient's ability to progress to PLOF and address remaining functional goals. (see flow sheet as applicable)     Details if applicable:            Details if applicable:     40  MC BC Totals Reminder: bill using total billable min of TIMED therapeutic procedures (example: do not include dry needle or estim unattended, both untimed codes, in totals to left)  8-22 min = 1 unit; 23-37 min = 2 units; 38-52 min = 3 units; 53-67 min = 4 units; 68-82 min = 5 units   Total Total     charge capture    [x]  Patient Education billed concurrently with other procedures   [x] Review HEP    [] Progressed/Changed HEP, detail:    [] Other detail:       Objective Information/Functional Measures/Assessment  Pt tolerated tx well    Patient will continue to benefit from skilled PT / OT services to modify and progress therapeutic interventions, analyze and address

## 2025-04-03 ENCOUNTER — HOSPITAL ENCOUNTER (OUTPATIENT)
Facility: HOSPITAL | Age: 65
Setting detail: RECURRING SERIES
Discharge: HOME | End: 2025-04-06
Payer: COMMERCIAL

## 2025-04-03 PROCEDURE — 97530 THERAPEUTIC ACTIVITIES: CPT

## 2025-04-03 PROCEDURE — 97112 NEUROMUSCULAR REEDUCATION: CPT

## 2025-04-03 NOTE — PROGRESS NOTES
PHYSICAL / OCCUPATIONAL THERAPY - DAILY TREATMENT NOTE (updated )    Patient Name: Angeles Denise    Date: 4/3/2025    : 1960  Insurance: Payor: ENA / Plan[facility] / Product Type: *No Product type* /      Patient  verified Yes     Visit #   Current / Total 8 12   Time   In / Out 2:00 2:40   Pain   In / Out 0 0   Subjective Functional Status/Changes: Pt reports she can hold her urine in longer and the burning has been less.      TREATMENT AREA =  Pelvic and perineal pain    OBJECTIVE    Therapeutic Procedures:    Tx Min Billable or 1:1 Min (if diff from Tx Min) Procedure, Rationale, Specifics   10  09969 Neuromuscular Re-Education (timed):  improve balance, coordination, kinesthetic sense, posture, core stability and proprioception to improve patient's ability to develop conscious control of individual muscles and awareness of position of extremities in order to progress to PLOF and address remaining functional goals. (see flow sheet as applicable)     Details if applicable:       12285 Therapeutic Activity (timed):  use of dynamic activities replicating functional movements to increase ROM, strength, coordination, balance, and proprioception in order to improve patient's ability to progress to PLOF and address remaining functional goals.  (see flow sheet as applicable)       Details if applicable:  testing for note of progress, pt edu on HEP after D/C   30  MC BC Totals Reminder: bill using total billable min of TIMED therapeutic procedures (example: do not include dry needle or estim unattended, both untimed codes, in totals to left)  8-22 min = 1 unit; 23-37 min = 2 units; 38-52 min = 3 units; 53-67 min = 4 units; 68-82 min = 5 units   Total Total     charge capture    [x]  Patient Education billed concurrently with other procedures   [x] Review HEP    [] Progressed/Changed HEP, detail:    [] Other detail:       Objective Information/Functional Measures/Assessment  Pt has met most 
  Patient will demonstrate improvement of current complaints evidenced by a 12 point  improvement in Pelvic functional disability index score  Status at Eval: Pelvic functional disability index 22  Current: 2 GOAL MET     Patient will demonstrate independence with management tools and exercise program that are beneficial for current condition in order to feel comfortable with Pelvic floor PT D/C and not fear exacerbation of current condition or symptoms returning.   Status at eval : patient fearful of return to exercise and unaware of what activities to avoid to avoid exacerbation of current condition  Current: GOAL MET    RECOMMENDATIONS  Pt has met most goals, will continue to improve with HEP, is ready to be D/Jose Martin at this time.     If you have any questions/comments please contact us directly at [phone].   Thank you for allowing us to assist in the care of your patient.  PTA signature  Kassandra Win, PT     Therapist Signature: Kassandra Win PT Date: 4/3/25   Reporting Period: 3/3/25 0 4/3/25 Time: 2:38 PM

## 2025-04-23 ENCOUNTER — LAB (OUTPATIENT)
Facility: CLINIC | Age: 65
End: 2025-04-23

## 2025-04-23 ENCOUNTER — HOSPITAL ENCOUNTER (OUTPATIENT)
Facility: HOSPITAL | Age: 65
Setting detail: SPECIMEN
Discharge: HOME | End: 2025-04-26

## 2025-04-23 DIAGNOSIS — D50.9 IRON DEFICIENCY ANEMIA, UNSPECIFIED IRON DEFICIENCY ANEMIA TYPE: ICD-10-CM

## 2025-04-23 DIAGNOSIS — M05.79 RHEUMATOID ARTHRITIS INVOLVING MULTIPLE SITES WITH POSITIVE RHEUMATOID FACTOR (HCC): ICD-10-CM

## 2025-04-23 DIAGNOSIS — R60.0 LEG EDEMA: ICD-10-CM

## 2025-04-23 PROCEDURE — 99001 SPECIMEN HANDLING PT-LAB: CPT

## 2025-04-24 LAB
ALBUMIN: 4.3 G/DL (ref 3.5–5)
ANION GAP SERPL CALCULATED.3IONS-SCNC: 12 MMOL/L (ref 3–15)
BASOPHILS # BLD: 1 % (ref 0–2)
BASOPHILS ABSOLUTE: 0.1 K/UL (ref 0–0.2)
BUN BLDV-MCNC: 7 MG/DL (ref 6–22)
CALCIUM SERPL-MCNC: 9.4 MG/DL (ref 8.4–10.5)
CHLORIDE BLD-SCNC: 102 MMOL/L (ref 98–110)
CO2: 24 MMOL/L (ref 20–32)
CREAT SERPL-MCNC: 0.6 MG/DL (ref 0.8–1.4)
EOSINOPHIL # BLD: 3 % (ref 0–6)
EOSINOPHILS ABSOLUTE: 0.2 K/UL (ref 0–0.5)
GFR, ESTIMATED: >60 ML/MIN/1.73 SQ.M.
GLUCOSE: 98 MG/DL (ref 70–99)
HCT VFR BLD CALC: 38.4 % (ref 35.1–48)
HEMOGLOBIN: 12.2 G/DL (ref 11.7–16)
IRON % SATURATION: 16 % (ref 20–50)
IRON: 50 MCG/DL (ref 30–160)
LYMPHOCYTES # BLD: 30 % (ref 20–45)
LYMPHOCYTES ABSOLUTE: 2.5 K/UL (ref 1–4.8)
MCH RBC QN AUTO: 30 PG (ref 26–34)
MCHC RBC AUTO-ENTMCNC: 32 G/DL (ref 31–36)
MCV RBC AUTO: 93 FL (ref 80–99)
MONOCYTES ABSOLUTE: 0.5 K/UL (ref 0.1–1)
MONOCYTES: 6 % (ref 3–12)
NEUTROPHILS ABSOLUTE: 5 K/UL (ref 1.8–7.7)
NEUTROPHILS SEGMENTED: 60 % (ref 40–75)
PDW BLD-RTO: 13.1 % (ref 10–15.5)
PHOSPHORUS: 3.8 MG/DL (ref 2.5–4.5)
PLATELET # BLD: 377 K/UL (ref 140–440)
PMV BLD AUTO: 9.6 FL (ref 9–13)
POTASSIUM SERPL-SCNC: 5 MMOL/L (ref 3.5–5.5)
RBC # BLD: 4.11 M/UL (ref 3.8–5.2)
SODIUM BLD-SCNC: 138 MMOL/L (ref 133–145)
TOTAL IRON BINDING CAPACITY: 322 MCG/DL (ref 228–428)
TSH SERPL DL<=0.05 MIU/L-ACNC: 3.26 MCU/ML (ref 0.27–4.2)
UNSATURATED IRON BINDING CAPACITY: 272 MCG/DL (ref 110–370)
WBC # BLD: 8.3 K/UL (ref 4–11)

## 2025-04-25 LAB — SENTARA SPECIMEN COLLECTION: NORMAL

## 2025-04-28 ENCOUNTER — OFFICE VISIT (OUTPATIENT)
Facility: CLINIC | Age: 65
End: 2025-04-28
Payer: COMMERCIAL

## 2025-04-28 VITALS
TEMPERATURE: 97.3 F | SYSTOLIC BLOOD PRESSURE: 91 MMHG | WEIGHT: 140.6 LBS | HEIGHT: 62 IN | OXYGEN SATURATION: 96 % | BODY MASS INDEX: 25.88 KG/M2 | RESPIRATION RATE: 14 BRPM | HEART RATE: 79 BPM | DIASTOLIC BLOOD PRESSURE: 57 MMHG

## 2025-04-28 DIAGNOSIS — N81.4 CYSTOCELE WITH PROLAPSE: ICD-10-CM

## 2025-04-28 DIAGNOSIS — Z12.4 CERVICAL CANCER SCREENING: ICD-10-CM

## 2025-04-28 DIAGNOSIS — I10 ESSENTIAL HYPERTENSION: Primary | ICD-10-CM

## 2025-04-28 PROCEDURE — 3074F SYST BP LT 130 MM HG: CPT | Performed by: STUDENT IN AN ORGANIZED HEALTH CARE EDUCATION/TRAINING PROGRAM

## 2025-04-28 PROCEDURE — 3078F DIAST BP <80 MM HG: CPT | Performed by: STUDENT IN AN ORGANIZED HEALTH CARE EDUCATION/TRAINING PROGRAM

## 2025-04-28 PROCEDURE — 99214 OFFICE O/P EST MOD 30 MIN: CPT | Performed by: STUDENT IN AN ORGANIZED HEALTH CARE EDUCATION/TRAINING PROGRAM

## 2025-04-28 RX ORDER — LOSARTAN POTASSIUM 25 MG/1
12.5 TABLET ORAL DAILY
Qty: 45 TABLET | Refills: 1 | Status: SHIPPED | OUTPATIENT
Start: 2025-04-28

## 2025-04-28 ASSESSMENT — ENCOUNTER SYMPTOMS
EYE ITCHING: 0
BACK PAIN: 0
COLOR CHANGE: 0
EYE PAIN: 0
EYE REDNESS: 0
EYE DISCHARGE: 0
ABDOMINAL PAIN: 0
DIARRHEA: 0
SHORTNESS OF BREATH: 0
VOMITING: 0
FACIAL SWELLING: 0
CONSTIPATION: 0

## 2025-04-28 NOTE — PROGRESS NOTES
Angeles Denise is a 64 y.o. year old female who presents today for   Chief Complaint   Patient presents with    Gynecologic Exam        \"Have you been to the ER, urgent care clinic since your last visit?  Hospitalized since your last visit?\"   NO     “Have you seen or consulted any other health care providers outside our system since your last visit?”   NO      “Have you had a pap smear?”    YES - Where: 04/28/2025 Nurse/CMA to request most recent records if not in the chart    No cervical cancer screening on file             - TAINA Lopez  [facility]  Phone: [phone]  Fax: [phone]

## 2025-04-28 NOTE — PROGRESS NOTES
Angeles Denise is a 64 y.o.  female and presents with    Chief Complaint   Patient presents with    Gynecologic Exam    Pelvic Pain           Subjective:    Last LMP: > 5years  Patient denies any abnormal vaginal discharge, pain or bleeding.  She is not sexually active.  Last pap smear:  > 6yrs ago  History of abnormal pap smear:  no    Hypertension follow up:  Taking medications as prescribed: Yes  Checking BP at home: Yes  Symptoms: none  Low sodium diet: Yes  Exercise: No     Pt is f/up w/ urology for her midline cystocele. She had previously a surgery to correct this but she was told she might need to have a revision. She states she has been having significant burning, not only when she pees but all the time. She has been taking Ibuprofen for the pain which seems to help. She has not been able to get the estrogen d/t her insurance not covering her medications.     Thinking about going to [place] for 3 months. Concerned about going d/t the pain medicine. She has not been putting the Humira d/t insurance covering it. She had Kenalog 80mg injectd at the office. Has been taking Ibuprofen as well d/t insurance not covering for her RA medication. She has been taking Plaquenil.     She has been taking the iron supplements.     Patient Active Problem List   Diagnosis    Cervical strain    COPD (chronic obstructive pulmonary disease) (HCC)    Chronic pain    S/P lumbar spinal fusion    Rheumatoid arthritis of multiple sites with negative rheumatoid factor ([facility])    Family history of leukemia    Acquired female bladder prolapse    Asymptomatic menopausal state    Lymphadenopathy, hilar    Dyspnea    H/O primary hypertension    Osteoporosis without current pathological fracture    Restrictive lung disease    History of cystocele      Past Medical History:   Diagnosis Date    Chronic obstructive pulmonary disease (HCC)     Hypertension     Other ill-defined conditions(185.22)     chronic pain    Trauma       Past

## 2025-04-30 LAB
CHLAMYDIA NUC AMP THIN PREP: NEGATIVE
GC NUC AMP THIN PREP: NEGATIVE
TRICHOMONAS NUC AMP-THIN PREP: NEGATIVE

## 2025-05-01 LAB
COLLECTION METHOD: NORMAL
FINAL DIAGNOSIS: NORMAL
GYNECOLOGIC CYTOLOGY REPORT: NORMAL
HPV, GENOTYPE 16: NEGATIVE
HPV, GENOTYPE 18: NEGATIVE
HPV, HIGH RISK: NEGATIVE
LMP: NORMAL
Lab: NORMAL
PAP NOTE: NORMAL
RELATED HPV RESULT(S): NORMAL
SPECIMEN ADEQUACY:: NORMAL

## 2025-05-05 ENCOUNTER — RESULTS FOLLOW-UP (OUTPATIENT)
Facility: CLINIC | Age: 65
End: 2025-05-05

## 2025-05-07 ENCOUNTER — TELEPHONE (OUTPATIENT)
Facility: CLINIC | Age: 65
End: 2025-05-07

## 2025-05-07 NOTE — TELEPHONE ENCOUNTER
Pt is calling in states she would like to get pain medication as she feels pain in the surgery area this is a burning sensation and the pain med she is taking is not helping             Patient indicates that she had being prescribed a suppository but is unsure the name and the cream, she did not get this only the bp medication         Please advise      Thank you

## 2025-05-19 ENCOUNTER — PATIENT MESSAGE (OUTPATIENT)
Facility: CLINIC | Age: 65
End: 2025-05-19

## 2025-06-06 PROBLEM — J45.909 ASTHMA: Status: ACTIVE | Noted: 2021-05-10

## 2025-06-06 PROBLEM — K59.03 CONSTIPATION DUE TO OPIOID THERAPY: Status: ACTIVE | Noted: 2025-06-06

## 2025-06-06 PROBLEM — M54.16 LUMBAR RADICULOPATHY: Status: ACTIVE | Noted: 2023-12-15

## 2025-06-06 PROBLEM — T81.9XXA POST-OPERATIVE COMPLICATION: Status: ACTIVE | Noted: 2024-05-24

## 2025-06-06 PROBLEM — N93.9 VAGINAL BLEEDING: Status: ACTIVE | Noted: 2024-05-24

## 2025-06-06 PROBLEM — K21.9 GERD (GASTROESOPHAGEAL REFLUX DISEASE): Status: ACTIVE | Noted: 2018-11-12

## 2025-06-06 PROBLEM — T40.2X5A CONSTIPATION DUE TO OPIOID THERAPY: Status: ACTIVE | Noted: 2025-06-06

## 2025-06-06 PROBLEM — M19.90 OSTEOARTHROSIS: Status: ACTIVE | Noted: 2023-03-02

## 2025-06-06 PROBLEM — R30.0 DYSURIA: Status: ACTIVE | Noted: 2024-08-31

## 2025-06-13 ENCOUNTER — OFFICE VISIT (OUTPATIENT)
Facility: CLINIC | Age: 65
End: 2025-06-13
Payer: COMMERCIAL

## 2025-06-13 VITALS
HEART RATE: 75 BPM | WEIGHT: 143.2 LBS | DIASTOLIC BLOOD PRESSURE: 63 MMHG | RESPIRATION RATE: 15 BRPM | TEMPERATURE: 97.6 F | HEIGHT: 62 IN | SYSTOLIC BLOOD PRESSURE: 107 MMHG | OXYGEN SATURATION: 97 % | BODY MASS INDEX: 26.35 KG/M2

## 2025-06-13 DIAGNOSIS — L30.9 DERMATITIS: Primary | ICD-10-CM

## 2025-06-13 DIAGNOSIS — Z01.818 PREOP EXAMINATION: ICD-10-CM

## 2025-06-13 DIAGNOSIS — J45.40 MODERATE PERSISTENT ASTHMA WITHOUT COMPLICATION: ICD-10-CM

## 2025-06-13 DIAGNOSIS — M06.09 RHEUMATOID ARTHRITIS OF MULTIPLE SITES WITH NEGATIVE RHEUMATOID FACTOR (HCC): ICD-10-CM

## 2025-06-13 DIAGNOSIS — N81.11 MIDLINE CYSTOCELE: ICD-10-CM

## 2025-06-13 DIAGNOSIS — I10 ESSENTIAL HYPERTENSION: ICD-10-CM

## 2025-06-13 DIAGNOSIS — J98.4 RESTRICTIVE LUNG DISEASE: ICD-10-CM

## 2025-06-13 PROCEDURE — 3078F DIAST BP <80 MM HG: CPT | Performed by: STUDENT IN AN ORGANIZED HEALTH CARE EDUCATION/TRAINING PROGRAM

## 2025-06-13 PROCEDURE — 99215 OFFICE O/P EST HI 40 MIN: CPT | Performed by: STUDENT IN AN ORGANIZED HEALTH CARE EDUCATION/TRAINING PROGRAM

## 2025-06-13 PROCEDURE — 3074F SYST BP LT 130 MM HG: CPT | Performed by: STUDENT IN AN ORGANIZED HEALTH CARE EDUCATION/TRAINING PROGRAM

## 2025-06-13 RX ORDER — TRIAMCINOLONE ACETONIDE 1 MG/G
OINTMENT TOPICAL
Qty: 80 G | Refills: 2 | Status: SHIPPED | OUTPATIENT
Start: 2025-06-13 | End: 2025-06-20

## 2025-06-13 ASSESSMENT — ENCOUNTER SYMPTOMS
BACK PAIN: 0
EYE PAIN: 0
COLOR CHANGE: 0
EYE DISCHARGE: 0
ABDOMINAL PAIN: 0
EYE ITCHING: 0
EYE REDNESS: 0
DIARRHEA: 0
SHORTNESS OF BREATH: 0
CONSTIPATION: 0
VOMITING: 0
FACIAL SWELLING: 0

## 2025-06-13 NOTE — PROGRESS NOTES
Angeles Denise is a 64 y.o. year old female who presents today for   Chief Complaint   Patient presents with    Hypertension     6 week follow up      Rash     On both arms and hands, very itchy. On chest also. No pain.         \"Have you been to the ER, urgent care clinic since your last visit?  Hospitalized since your last visit?\"   NO     “Have you seen or consulted any other health care providers outside our system since your last visit?”   NO             - TAINA Lopez  [facility]  Phone: [phone]  Fax: [phone]  
factor (HCC)  F/up w/ rheumatology, stable    6. Midline cystocele  F/up w/ urology    7. Preop examination        Lab review: reviewed the labs of preop and they are WNL    On this date 6/13/2025 I have spent 45 minutes reviewing previous notes, test results and face to face with the patient discussing the diagnosis and importance of compliance with the treatment plan as well as documenting on the day of the visit.    I have discussed the diagnosis with the patient and the intended plan as seen in the above orders.  The patient has received an after-visit summary and questions were answered concerning future plans.  I have discussed medication side effects and warnings with the patient as well. I have reviewed the plan of care with the patient, accepted their input and they are in agreement with the treatment goals.     Blank Hernandez MD     
probability for perioperative myocardial infarct or cardiac arrest is: 0.1% The revised cardiovascular risk index is <1 and is associated with a <4 percent risk of major cardiovascular events. Ms. Denise has the following risk factors: has restricted lung disease, RA and hx of asthma.  Ms. Denise does have the ability to perform > 4 MET levels of activity and has no active cardiac conditions. Patient has been medically optimized for surgery, pulmonary given surgical clearance for patient to proceed with surgery .      Lab review: reviewed the labs of preop and they are WNL     On this date 6/13/2025 I have spent 45 minutes reviewing previous notes, test results and face to face with the patient discussing the diagnosis and importance of compliance with the treatment plan as well as documenting on the day of the visit.     I have discussed the diagnosis with the patient and the intended plan as seen in the above orders.  The patient has received an after-visit summary and questions were answered concerning future plans.  I have discussed medication side effects and warnings with the patient as well. I have reviewed the plan of care with the patient, accepted their input and they are in agreement with the treatment goals.      Blank Hernandez MD

## 2025-07-31 ENCOUNTER — TELEPHONE (OUTPATIENT)
Age: 65
End: 2025-07-31

## 2025-07-31 NOTE — TELEPHONE ENCOUNTER
Pt scheduled for acute care video visit at 2:30 PM. Link sent to pt via text. Pt did not join visit. This MA attempted to reach pt via telephone to verify she received the link.. No answer. Left message advising I was calling to offer assistance with joining the video visit, and verified that she did receive the text. Attempted to reach again at home number. No answer.